# Patient Record
Sex: MALE | Race: BLACK OR AFRICAN AMERICAN | Employment: OTHER | URBAN - METROPOLITAN AREA
[De-identification: names, ages, dates, MRNs, and addresses within clinical notes are randomized per-mention and may not be internally consistent; named-entity substitution may affect disease eponyms.]

---

## 2018-11-08 ENCOUNTER — OP HISTORICAL/CONVERTED ENCOUNTER (OUTPATIENT)
Dept: OTHER | Age: 73
End: 2018-11-08

## 2021-06-01 PROBLEM — R13.10 DYSPHAGIA: Status: ACTIVE | Noted: 2021-06-01

## 2021-06-01 PROBLEM — R14.3 FLATULENCE: Status: ACTIVE | Noted: 2021-06-01

## 2021-06-01 PROBLEM — N40.0 BPH (BENIGN PROSTATIC HYPERPLASIA): Status: ACTIVE | Noted: 2021-06-01

## 2021-06-01 PROBLEM — K22.2 STRICTURE OF ESOPHAGUS: Status: ACTIVE | Noted: 2021-06-01

## 2021-06-01 PROBLEM — I10 HTN (HYPERTENSION): Status: ACTIVE | Noted: 2021-06-01

## 2021-06-01 PROBLEM — K21.00 REFLUX ESOPHAGITIS: Status: ACTIVE | Noted: 2021-06-01

## 2021-06-07 ENCOUNTER — OFFICE VISIT (OUTPATIENT)
Dept: GASTROENTEROLOGY | Age: 76
End: 2021-06-07
Payer: MEDICARE

## 2021-06-07 VITALS
DIASTOLIC BLOOD PRESSURE: 60 MMHG | OXYGEN SATURATION: 98 % | WEIGHT: 173.4 LBS | SYSTOLIC BLOOD PRESSURE: 130 MMHG | HEART RATE: 76 BPM | RESPIRATION RATE: 16 BRPM | BODY MASS INDEX: 24.27 KG/M2 | TEMPERATURE: 98.2 F | HEIGHT: 71 IN

## 2021-06-07 DIAGNOSIS — R13.19 ESOPHAGEAL DYSPHAGIA: Primary | ICD-10-CM

## 2021-06-07 DIAGNOSIS — Z86.010 HISTORY OF COLON POLYPS: ICD-10-CM

## 2021-06-07 DIAGNOSIS — K21.00 GASTROESOPHAGEAL REFLUX DISEASE WITH ESOPHAGITIS WITHOUT HEMORRHAGE: ICD-10-CM

## 2021-06-07 DIAGNOSIS — K22.2 STRICTURE OF ESOPHAGUS: ICD-10-CM

## 2021-06-07 PROCEDURE — 3017F COLORECTAL CA SCREEN DOC REV: CPT | Performed by: INTERNAL MEDICINE

## 2021-06-07 PROCEDURE — G8536 NO DOC ELDER MAL SCRN: HCPCS | Performed by: INTERNAL MEDICINE

## 2021-06-07 PROCEDURE — G8510 SCR DEP NEG, NO PLAN REQD: HCPCS | Performed by: INTERNAL MEDICINE

## 2021-06-07 PROCEDURE — G8420 CALC BMI NORM PARAMETERS: HCPCS | Performed by: INTERNAL MEDICINE

## 2021-06-07 PROCEDURE — 99204 OFFICE O/P NEW MOD 45 MIN: CPT | Performed by: INTERNAL MEDICINE

## 2021-06-07 PROCEDURE — 1101F PT FALLS ASSESS-DOCD LE1/YR: CPT | Performed by: INTERNAL MEDICINE

## 2021-06-07 PROCEDURE — G8754 DIAS BP LESS 90: HCPCS | Performed by: INTERNAL MEDICINE

## 2021-06-07 PROCEDURE — G8427 DOCREV CUR MEDS BY ELIG CLIN: HCPCS | Performed by: INTERNAL MEDICINE

## 2021-06-07 PROCEDURE — G8752 SYS BP LESS 140: HCPCS | Performed by: INTERNAL MEDICINE

## 2021-06-07 RX ORDER — HYDROCHLOROTHIAZIDE 12.5 MG/1
12.5 CAPSULE ORAL DAILY
COMMUNITY
Start: 2021-05-03

## 2021-06-07 RX ORDER — LISINOPRIL 40 MG/1
40 TABLET ORAL DAILY
COMMUNITY
Start: 2021-05-03

## 2021-06-07 RX ORDER — NIFEDIPINE 30 MG/1
30 TABLET, EXTENDED RELEASE ORAL DAILY
COMMUNITY
Start: 2021-05-03

## 2021-06-07 RX ORDER — PANTOPRAZOLE SODIUM 40 MG/1
40 TABLET, DELAYED RELEASE ORAL DAILY
COMMUNITY
Start: 2021-05-03

## 2021-06-07 RX ORDER — POLYETHYLENE GLYCOL 3350 17 G/17G
POWDER, FOR SOLUTION ORAL
Qty: 510 G | Refills: 0 | Status: SHIPPED | OUTPATIENT
Start: 2021-06-07 | End: 2021-06-17

## 2021-06-07 NOTE — H&P (VIEW-ONLY)
Ang Kimball is a 76 y.o. male who presents today for the following: Chief Complaint Patient presents with  Dysphagia  
  problem swallowing, choking No Known Allergies Current Outpatient Medications Medication Sig  
 hydroCHLOROthiazide (MICROZIDE) 12.5 mg capsule  lisinopriL (PRINIVIL, ZESTRIL) 40 mg tablet  NIFEdipine ER (PROCARDIA XL) 30 mg ER tablet  pantoprazole (PROTONIX) 40 mg tablet  polyethylene glycol (MIRALAX) 17 gram/dose powder Use as directed  Indications: emptying of the bowel No current facility-administered medications for this visit. Past Medical History:  
Diagnosis Date  BPH (benign prostatic hyperplasia) 6/1/2021  Colon polyps 2007 RECTAL POLYP  Cyst of kidney, acquired R KIDNEY 11-6-2010  Dysphagia 6/1/2021  Flatulence 6/1/2021  GERD (gastroesophageal reflux disease)  HTN (hypertension) 6/1/2021  Left adrenal mass ([address] 09/27/2004 ADENOMA  11-6-06  Reflux esophagitis 6/1/2021  Stricture of esophagus 6/1/2021 Past Surgical History:  
Procedure Laterality Date  APPENDECTOMY,W OTHR C    
 COLONOSCOPY  10/01/2007 RECTAL, POLYP  
 ENDOSCOPY VISIT-OUTPATIENT  07/10/2015  ENDOSCOPY VISIT-OUTPATIENT  10/01/2007 EGD, ED , COLONOSCOPY RECTAL POLYP  
 HX CATARACT REMOVAL Bilateral   
 9-, 10-25-05 Family History Problem Relation Age of Onset  Heart Disease Mother  Hypertension Other Social History Socioeconomic History  Marital status:  Spouse name: Not on file  Number of children: Not on file  Years of education: Not on file  Highest education level: Not on file Occupational History  Not on file Tobacco Use  Smoking status: Former Smoker  Smokeless tobacco: Never Used Substance and Sexual Activity  Alcohol use: Yes Comment: OCCASIONAL  
 Drug use: Not on file  Sexual activity: Not on file Other Topics Concern  Not on file Social History Narrative  Not on file Social Determinants of Health Financial Resource Strain:  Difficulty of Paying Living Expenses:   
Food Insecurity:  Worried About [address] in the Last Year:   
[address] in the Last Year:   
Transportation Needs:   
 Lack of Transportation (Medical):  Lack of Transportation (Non-Medical): Physical Activity:   
 Days of Exercise per Week:  Minutes of Exercise per Session:   
Stress:  Feeling of Stress :   
Social Connections:  Frequency of Communication with Friends and Family:  Frequency of Social Gatherings with Friends and Family:  Attends Spiritism Services:  Active Member of Clubs or Organizations:  Attends Club or Organization Meetings:  Marital Status: Intimate Partner Violence:  Fear of Current or Ex-Partner:  Emotionally Abused:   
 Physically Abused:   
 Sexually Abused: HPI 
77-year-old male 1year-old hypertension, gastroesophageal reflux disease, esophageal stricture, and BPH who comes in for evaluation of dysphagia. Patient states he has been doing somewhat better over the last couple weeks but had a lot of problems prior to that. States he has been put off, see a daughter for the last 6 months secondary to his dysphagia. He must drink a lot of water with anything solid he eats. He states however at times even water has a hard time going down. He does have a lot of heartburn and indigestion. Patient has a history of diarrhea intermittently. He also has increased GI noise. He states his weight has been stable. Patient last had an EGD with esophageal dilatation on 7/10/2015 which showed a lower esophageal stricture, reflux esophagitis his esophagus was dilated with the [facility] bougies, using 40, 44, and 50 Western Kathy dilators. Last had a colonoscopy in 2007 where he rectal polyp was removed. Review of Systems Constitutional: Negative.    
HENT: Negative. Negative for nosebleeds. Eyes: Negative. Respiratory: Negative. Negative for cough. Cardiovascular: Negative. Gastrointestinal: Positive for diarrhea and heartburn. Negative for abdominal pain, blood in stool, constipation, melena, nausea and vomiting. Genitourinary: Negative. Musculoskeletal: Negative. Skin: Negative. Neurological: Negative. Endo/Heme/Allergies: Negative. Psychiatric/Behavioral: Negative. All other systems reviewed and are negative. Visit Vitals /60 (BP 1 Location: Left upper arm, BP Patient Position: Sitting, BP Cuff Size: Adult) Pulse 76 Temp 98.2 °F (36.8 °C) (Temporal) Resp 16 Ht 5' 11\" (1.803 m) Wt 78.7 kg (173 lb 6.4 oz) SpO2 98% BMI 24.18 kg/m² Physical Exam 
Vitals and nursing note reviewed. Constitutional:   
   General: He is not in acute distress. Appearance: Normal appearance. He is obese. He is not ill-appearing. HENT:  
   Head: Normocephalic and atraumatic. Nose: Nose normal.  
   Mouth/Throat:  
   Mouth: Mucous membranes are moist.  
   Pharynx: Oropharynx is clear. Eyes:  
   General: No scleral icterus. Extraocular Movements: Extraocular movements intact. Conjunctiva/sclera: Conjunctivae normal.  
   Pupils: Pupils are equal, round, and reactive to light. Cardiovascular:  
   Rate and Rhythm: Normal rate and regular rhythm. Heart sounds: Normal heart sounds. Pulmonary:  
   Effort: Pulmonary effort is normal.  
   Breath sounds: Normal breath sounds. Abdominal:  
   General: Bowel sounds are normal. There is no distension. Palpations: Abdomen is soft. There is no mass. Tenderness: There is no abdominal tenderness. There is no right CVA tenderness, left CVA tenderness, guarding or rebound. Hernia: No hernia is present. Musculoskeletal:     
   General: Normal range of motion. Cervical back: Normal range of motion and neck supple.   
Skin: 
   General: Skin is warm and dry. Coloration: Skin is not jaundiced. Neurological:  
   General: No focal deficit present. Mental Status: He is alert and oriented to person, place, and time. Psychiatric:     
   Mood and Affect: Mood normal.     
   Behavior: Behavior normal.     
   Thought Content: Thought content normal.     
   Judgment: Judgment normal.  
 
  
 
 
1. Esophageal dysphagia We will plan to perform an EGD with esophageal dilatation. 
- UPPER GI ENDOSCOPY,DIAGNOSIS; Future 2. Stricture of esophagus Likely the underlying cause for his dysphagia. - UPPER GI ENDOSCOPY,DIAGNOSIS; Future 3. Gastroesophageal reflux disease with esophagitis without hemorrhage - UPPER GI ENDOSCOPY,DIAGNOSIS; Future 4. History of colon polyps Patient is past due for his surveillance colonoscopy since it has been 14 years. - COLONOSCOPY,DIAGNOSTIC; Future - polyethylene glycol (MIRALAX) 17 gram/dose powder; Use as directed  Indications: emptying of the bowel  Dispense: 510 g; Refill: 0

## 2021-06-07 NOTE — PROGRESS NOTES
1. Have you been to the ER, urgent care clinic since your last visit? Hospitalized since your last visit? NO    2. Have you seen or consulted any other health care providers outside of the [facility] since your last visit? Include any pap smears or colon screening. NO   Chief Complaint   Patient presents with    Dysphagia     problem swallowing, choking     Visit Vitals  /60 (BP 1 Location: Left upper arm, BP Patient Position: Sitting, BP Cuff Size: Adult)   Pulse 76   Temp 98.2 °F (36.8 °C) (Temporal)   Resp 16   Ht 5' 11\" (1.803 m)   Wt 78.7 kg (173 lb 6.4 oz)   SpO2 98%   BMI 24.18 kg/m²     .

## 2021-06-07 NOTE — PROGRESS NOTES
EGD AND COLONOSCOPY ARE SCHEDULED FOR 6- AT 11:00 AM.  FULLY VACCINATED FOR COVID-NO TEST REQUIRED. NO PA REQUIRED-MEDICARE.

## 2021-06-07 NOTE — PROGRESS NOTES
Aiden Gunn is a 76 y.o. male who presents today for the following:  Chief Complaint   Patient presents with    Dysphagia     problem swallowing, choking         No Known Allergies    Current Outpatient Medications   Medication Sig    hydroCHLOROthiazide (MICROZIDE) 12.5 mg capsule     lisinopriL (PRINIVIL, ZESTRIL) 40 mg tablet     NIFEdipine ER (PROCARDIA XL) 30 mg ER tablet     pantoprazole (PROTONIX) 40 mg tablet     polyethylene glycol (MIRALAX) 17 gram/dose powder Use as directed  Indications: emptying of the bowel     No current facility-administered medications for this visit.        Past Medical History:   Diagnosis Date    BPH (benign prostatic hyperplasia) 6/1/2021    Colon polyps 2007    RECTAL POLYP    Cyst of kidney, acquired     R KIDNEY 11-6-2010    Dysphagia 6/1/2021    Flatulence 6/1/2021    GERD (gastroesophageal reflux disease)     HTN (hypertension) 6/1/2021    Left adrenal mass ([address] 09/27/2004    ADENOMA  11-6-06    Reflux esophagitis 6/1/2021    Stricture of esophagus 6/1/2021       Past Surgical History:   Procedure Laterality Date    APPENDECTOMY,W OTHR C      COLONOSCOPY  10/01/2007    RECTAL, POLYP    ENDOSCOPY VISIT-OUTPATIENT  07/10/2015    ENDOSCOPY VISIT-OUTPATIENT  10/01/2007    EGD, ED , COLONOSCOPY RECTAL POLYP    HX CATARACT REMOVAL Bilateral     9-, 10-25-05       Family History   Problem Relation Age of Onset    Heart Disease Mother     Hypertension Other        Social History     Socioeconomic History    Marital status:      Spouse name: Not on file    Number of children: Not on file    Years of education: Not on file    Highest education level: Not on file   Occupational History    Not on file   Tobacco Use    Smoking status: Former Smoker    Smokeless tobacco: Never Used   Substance and Sexual Activity    Alcohol use: Yes     Comment: OCCASIONAL    Drug use: Not on file    Sexual activity: Not on file   Other Topics Concern    Not on file   Social History Narrative    Not on file     Social Determinants of Health     Financial Resource Strain:     Difficulty of Paying Living Expenses:    Food Insecurity:     Worried About Running Out of Food in the Last Year:     [address] in the Last Year:    Transportation Needs:     Lack of Transportation (Medical):  Lack of Transportation (Non-Medical):    Physical Activity:     Days of Exercise per Week:     Minutes of Exercise per Session:    Stress:     Feeling of Stress :    Social Connections:     Frequency of Communication with Friends and Family:     Frequency of Social Gatherings with Friends and Family:     Attends [employer] Services:     Active Member of Clubs or Organizations:     Attends Club or Organization Meetings:     Marital Status:    Intimate Partner Violence:     Fear of Current or Ex-Partner:     Emotionally Abused:     Physically Abused:     Sexually Abused:          HPI  51-year-old male 1year-old hypertension, gastroesophageal reflux disease, esophageal stricture, and BPH who comes in for evaluation of dysphagia. Patient states he has been doing somewhat better over the last couple weeks but had a lot of problems prior to that. States he has been put off, see a daughter for the last 6 months secondary to his dysphagia. He must drink a lot of water with anything solid he eats. He states however at times even water has a hard time going down. He does have a lot of heartburn and indigestion. Patient has a history of diarrhea intermittently. He also has increased GI noise. He states his weight has been stable. Patient last had an EGD with esophageal dilatation on 7/10/2015 which showed a lower esophageal stricture, reflux esophagitis his esophagus was dilated with the [facility] bougies, using 40, 44, and 50 Western Kathy dilators. Last had a colonoscopy in 2007 where he rectal polyp was removed. Review of Systems   Constitutional: Negative.     HENT: Negative. Negative for nosebleeds. Eyes: Negative. Respiratory: Negative. Negative for cough. Cardiovascular: Negative. Gastrointestinal: Positive for diarrhea and heartburn. Negative for abdominal pain, blood in stool, constipation, melena, nausea and vomiting. Genitourinary: Negative. Musculoskeletal: Negative. Skin: Negative. Neurological: Negative. Endo/Heme/Allergies: Negative. Psychiatric/Behavioral: Negative. All other systems reviewed and are negative. Visit Vitals  /60 (BP 1 Location: Left upper arm, BP Patient Position: Sitting, BP Cuff Size: Adult)   Pulse 76   Temp 98.2 °F (36.8 °C) (Temporal)   Resp 16   Ht 5' 11\" (1.803 m)   Wt 78.7 kg (173 lb 6.4 oz)   SpO2 98%   BMI 24.18 kg/m²     Physical Exam  Vitals and nursing note reviewed. Constitutional:       General: He is not in acute distress. Appearance: Normal appearance. He is obese. He is not ill-appearing. HENT:      Head: Normocephalic and atraumatic. Nose: Nose normal.      Mouth/Throat:      Mouth: Mucous membranes are moist.      Pharynx: Oropharynx is clear. Eyes:      General: No scleral icterus. Extraocular Movements: Extraocular movements intact. Conjunctiva/sclera: Conjunctivae normal.      Pupils: Pupils are equal, round, and reactive to light. Cardiovascular:      Rate and Rhythm: Normal rate and regular rhythm. Heart sounds: Normal heart sounds. Pulmonary:      Effort: Pulmonary effort is normal.      Breath sounds: Normal breath sounds. Abdominal:      General: Bowel sounds are normal. There is no distension. Palpations: Abdomen is soft. There is no mass. Tenderness: There is no abdominal tenderness. There is no right CVA tenderness, left CVA tenderness, guarding or rebound. Hernia: No hernia is present. Musculoskeletal:         General: Normal range of motion. Cervical back: Normal range of motion and neck supple.    Skin:     General: Skin is warm and dry. Coloration: Skin is not jaundiced. Neurological:      General: No focal deficit present. Mental Status: He is alert and oriented to person, place, and time. Psychiatric:         Mood and Affect: Mood normal.         Behavior: Behavior normal.         Thought Content: Thought content normal.         Judgment: Judgment normal.            1. Esophageal dysphagia  We will plan to perform an EGD with esophageal dilatation.  - UPPER GI ENDOSCOPY,DIAGNOSIS; Future    2. Stricture of esophagus  Likely the underlying cause for his dysphagia. - UPPER GI ENDOSCOPY,DIAGNOSIS; Future    3. Gastroesophageal reflux disease with esophagitis without hemorrhage    - UPPER GI ENDOSCOPY,DIAGNOSIS; Future    4. History of colon polyps  Patient is past due for his surveillance colonoscopy since it has been 14 years.   - COLONOSCOPY,DIAGNOSTIC; Future  - polyethylene glycol (MIRALAX) 17 gram/dose powder; Use as directed  Indications: emptying of the bowel  Dispense: 510 g; Refill: 0

## 2021-06-17 ENCOUNTER — ANESTHESIA EVENT (OUTPATIENT)
Dept: ENDOSCOPY | Age: 76
End: 2021-06-17
Payer: MEDICARE

## 2021-06-17 ENCOUNTER — ANESTHESIA (OUTPATIENT)
Dept: ENDOSCOPY | Age: 76
End: 2021-06-17
Payer: MEDICARE

## 2021-06-17 ENCOUNTER — HOSPITAL ENCOUNTER (OUTPATIENT)
Age: 76
Setting detail: OUTPATIENT SURGERY
Discharge: HOME | End: 2021-06-17
Attending: INTERNAL MEDICINE | Admitting: INTERNAL MEDICINE
Payer: MEDICARE

## 2021-06-17 VITALS
SYSTOLIC BLOOD PRESSURE: 145 MMHG | HEIGHT: 71 IN | TEMPERATURE: 97.9 F | WEIGHT: 168 LBS | HEART RATE: 85 BPM | DIASTOLIC BLOOD PRESSURE: 80 MMHG | OXYGEN SATURATION: 97 % | BODY MASS INDEX: 23.52 KG/M2 | RESPIRATION RATE: 20 BRPM

## 2021-06-17 PROCEDURE — 74011250636 HC RX REV CODE- 250/636: Performed by: NURSE ANESTHETIST, CERTIFIED REGISTERED

## 2021-06-17 PROCEDURE — 88312 SPECIAL STAINS GROUP 1: CPT

## 2021-06-17 PROCEDURE — 43450 DILATE ESOPHAGUS 1/MULT PASS: CPT | Performed by: INTERNAL MEDICINE

## 2021-06-17 PROCEDURE — 43239 EGD BIOPSY SINGLE/MULTIPLE: CPT | Performed by: INTERNAL MEDICINE

## 2021-06-17 PROCEDURE — 2709999900 HC NON-CHARGEABLE SUPPLY: Performed by: INTERNAL MEDICINE

## 2021-06-17 PROCEDURE — 76060000032 HC ANESTHESIA 0.5 TO 1 HR: Performed by: INTERNAL MEDICINE

## 2021-06-17 PROCEDURE — 45378 DIAGNOSTIC COLONOSCOPY: CPT | Performed by: INTERNAL MEDICINE

## 2021-06-17 PROCEDURE — 76040000007: Performed by: INTERNAL MEDICINE

## 2021-06-17 PROCEDURE — 77030018831 HC SOL IRR H20 BAXT -A: Performed by: INTERNAL MEDICINE

## 2021-06-17 PROCEDURE — 88305 TISSUE EXAM BY PATHOLOGIST: CPT

## 2021-06-17 PROCEDURE — 77030021593 HC FCPS BIOP ENDOSC BSC -A: Performed by: INTERNAL MEDICINE

## 2021-06-17 PROCEDURE — 74011250636 HC RX REV CODE- 250/636: Performed by: INTERNAL MEDICINE

## 2021-06-17 RX ORDER — SODIUM CHLORIDE 0.9 % (FLUSH) 0.9 %
5-40 SYRINGE (ML) INJECTION EVERY 8 HOURS
Status: DISCONTINUED | OUTPATIENT
Start: 2021-06-17 | End: 2021-06-17 | Stop reason: HOSPADM

## 2021-06-17 RX ORDER — SODIUM CHLORIDE 9 MG/ML
125 INJECTION, SOLUTION INTRAVENOUS CONTINUOUS
Status: DISCONTINUED | OUTPATIENT
Start: 2021-06-17 | End: 2021-06-17 | Stop reason: HOSPADM

## 2021-06-17 RX ORDER — PROPOFOL 10 MG/ML
INJECTION, EMULSION INTRAVENOUS AS NEEDED
Status: DISCONTINUED | OUTPATIENT
Start: 2021-06-17 | End: 2021-06-17 | Stop reason: HOSPADM

## 2021-06-17 RX ORDER — SODIUM CHLORIDE 0.9 % (FLUSH) 0.9 %
5-40 SYRINGE (ML) INJECTION AS NEEDED
Status: DISCONTINUED | OUTPATIENT
Start: 2021-06-17 | End: 2021-06-17 | Stop reason: HOSPADM

## 2021-06-17 RX ORDER — SODIUM CHLORIDE 9 MG/ML
INJECTION, SOLUTION INTRAVENOUS
Status: DISCONTINUED | OUTPATIENT
Start: 2021-06-17 | End: 2021-06-17 | Stop reason: HOSPADM

## 2021-06-17 RX ORDER — SODIUM CHLORIDE 9 MG/ML
25 INJECTION, SOLUTION INTRAVENOUS CONTINUOUS
Status: DISCONTINUED | OUTPATIENT
Start: 2021-06-17 | End: 2021-06-17 | Stop reason: HOSPADM

## 2021-06-17 RX ADMIN — PROPOFOL 100 MG: 10 INJECTION, EMULSION INTRAVENOUS at 11:33

## 2021-06-17 RX ADMIN — PROPOFOL 100 MG: 10 INJECTION, EMULSION INTRAVENOUS at 11:23

## 2021-06-17 RX ADMIN — PROPOFOL 100 MG: 10 INJECTION, EMULSION INTRAVENOUS at 11:42

## 2021-06-17 RX ADMIN — SODIUM CHLORIDE 25 ML/HR: 9 INJECTION, SOLUTION INTRAVENOUS at 09:30

## 2021-06-17 RX ADMIN — PROPOFOL 100 MG: 10 INJECTION, EMULSION INTRAVENOUS at 11:11

## 2021-06-17 RX ADMIN — SODIUM CHLORIDE: 9 INJECTION, SOLUTION INTRAVENOUS at 10:59

## 2021-06-17 NOTE — OP NOTES
Colonoscopy Procedure Note      Patient: Nestor Crowe MRN: [id number]  SSN: [id number]    YOB: 1945  Age: 76 y.o. Sex: male      Date of Procedure: 6/17/2021  Date/Time:  6/17/2021 11:59 AM       IMPRESSION:     1. Redundant colon       RECOMMENDATIONS:     1) Patient should have repeat colonoscopy in 5 years. INDICATION: History of colon polyps     PROCEDURE PERFORMED: Colonoscopy      DESCRIPTION OF PROCEDURE: An informed consent was obtained. The patient was placed in left lateral position. Perianal inspection and a digital rectal exam was performed. Video colonoscope was introduced into the rectum and advanced under direct vision up to the terminal ileum. With adequate insufflation and maneuvering of the withdrawing scope, the colonic mucosa was visualized carefully. Retroflexion was performed in the rectum to see the anorectum and also in the ascending colon to look behind the folds. Vital signs, pulse oximetry, single lead cardiac monitor were monitored throughout the procedure as the sedation was titrated to the desired effect ensuring patient comfort and safety. The patient tolerated the procedure very well and was transferred to the recovery area. Following is the summary of findings: There was a large amount of redundancy in the right colon otherwise unremarkable. ENDOSCOPIST: Amaya Mendoza MD      ENDOSCOPE: Olympus videocolonoscope     ASSISTANT:Circ-1: Rashmi Murrieta RN              Scrub Tech-1: Yanely Ramos     ANESTHESIA: TIVA      QUALITY OF PREPARATION: Good      FINDINGS:   1.  Redundant colon      EBL: None   SPECIMENS:   ID Type Source Tests Collected by Time Destination   1 : gastric antrum Preservative Stomach, Antrum  Naida Orona MD 6/17/2021 1114 Pathology             Amaya Mendoza MD  June 17, 2021  11:59 AM

## 2021-06-17 NOTE — ANESTHESIA PREPROCEDURE EVALUATION
Relevant Problems   No relevant active problems       Anesthetic History   No history of anesthetic complications  Other anesthesia complications          Review of Systems / Medical History  Patient summary reviewed, nursing notes reviewed and pertinent labs reviewed    Pulmonary  Within defined limits                 Neuro/Psych   Within defined limits           Cardiovascular  Within defined limits                     GI/Hepatic/Renal  Within defined limits              Endo/Other  Within defined limits           Other Findings              Physical Exam    Airway  Mallampati: II  TM Distance: 4 - 6 cm  Neck ROM: normal range of motion        Cardiovascular    Rhythm: regular  Rate: normal         Dental    Dentition: Edentulous     Pulmonary  Breath sounds clear to auscultation               Abdominal  Abdominal exam normal       Other Findings            Anesthetic Plan    ASA: 3  Anesthesia type: total IV anesthesia            Anesthetic plan and risks discussed with: Patient

## 2021-06-17 NOTE — INTERVAL H&P NOTE
Update History & Physical 
 
The Patient's History and Physical of June 17, 2021 was reviewed with the patient and I examined the patient. There was no change. The surgical site was confirmed by the patient and me. Plan:  The risk, benefits, expected outcome, and alternative to the recommended procedure have been discussed with the patient. Patient understands and wants to proceed with the procedure.  
 
Electronically signed by Nick Meyers MD on 6/17/2021 at 9:49 AM

## 2021-06-17 NOTE — DISCHARGE INSTRUCTIONS

## 2021-06-17 NOTE — ANESTHESIA POSTPROCEDURE EVALUATION
Procedure(s):  COLONOSCOPY AND EGD (T I V A)  ESOPHAGOGASTRODUODENOSCOPY (EGD). total IV anesthesia    Anesthesia Post Evaluation      Multimodal analgesia: multimodal analgesia not used between 6 hours prior to anesthesia start to PACU discharge  Patient location during evaluation: PACU  Patient participation: complete - patient participated  Level of consciousness: awake  Pain score: 0  Pain management: adequate  Airway patency: patent  Anesthetic complications: no  Cardiovascular status: acceptable  Respiratory status: acceptable and room air  Hydration status: stable  Post anesthesia nausea and vomiting:  none  Final Post Anesthesia Temperature Assessment:  Normothermia (36.0-37.5 degrees C)      INITIAL Post-op Vital signs: No vitals data found for the desired time range.

## 2021-06-17 NOTE — OP NOTES
EGD Procedure Note        Patient: Natalia Bullock MRN: [id number]  SSN: [id number]    YOB: 1945  Age: 76 y.o. Sex: male        Date/Time:  6/17/2021 12:06 PM         IMPRESSION:       1. Lower esophageal stricture  2. Distal esophagitis (grade 2)  3. Antral gastritis       RECOMMENDATIONS:    1. Check biopsy results. 2. Continue the pantoprazole 40 mg daily. 3. Repeat esophageal dilatation as needed. Procedure: Esophagogastroduodenoscopy with cold biopsies                       Esophageal dilatation    Indication: Esophageal dysphagia    Endoscopist:  Ron Ordonez MD    Referring Provider:   Vamsi Pizarro NP    History: The history and physical exam were reviewed and updated. Endoscope: GIF H190 Olympus video endoscope    Extent of Exam: Second part of the duodenum    ASA: Grade 2    Anethesia/Sedation:  TIVA    Description of the procedure: The procedure was discussed with the patient including risks, benefits, alternatives including risks of iv sedation, bleeding, perforation and aspiration. A safety timeout was performed. The patient was placed in the left lateral decubitus position. A bite block was placed. The patient was using standard protocol. The patients vital signs were monitored at all times including heart rate/rhythm, blood pressure and oxygen saturation. The endoscope was then passed under direct visualization to the second part of the duodenum. The endoscope was then slowly withdrawn while visualizing the mucosa. In the stomach a retroflexion was performed and gastric fundus and cardia visualized. The patient was then transferred to recovery in stable condition. Findings:   Esophagus: The esophageal mucosa was noted to be inflamed in the GE junction region where there was presence of a lower esophageal stricture. The inflammation was felt to be a grade 2 esophagitis. Also noted to have decreased lower esophageal sphincter tone.   In the remainder of the esophagus the patient had a whitish exudate that was noted scattered throughout the esophagus possibly consistent with Candida esophagitis. May consider treating patient for that if he is symptomatic. Stomach: The gastric mucosa was flamed throughout the gastric antrum and to lesser degree over the entire stomach. Biopsies were taken in the gastric antrum of this finding. Duodenum: The duodenum mucosa was normal with no ulceration, mass, stricture and no evidence of villous atrophy. Therapies: Esophageal dilatation using the [address] bougie. After the dilator was well lubricated it was inserted down the oropharynx and take down time 4 passes and then removed. Patient tolerated the procedure well. Specimens:   ID Type Source Tests Collected by Time Destination   1 : gastric antrum Preservative Stomach, Antrum  Elver Krishnamurthy MD 6/17/2021 1114 Pathology              EBL: Minimal    Complications:   None; patient tolerated the procedure well.      Implants: None    Discharge disposition:  Out of the recovery area when discharge criteria met        Yg Monsivais MD  June 17, 2021  12:06 PM

## 2021-06-22 NOTE — PROGRESS NOTES
Tell patient that the biopsies took in his stomach showed gastritis/inflammation. Infection was noted. Continue the pantoprazole 40 mg daily. Did have a repeat colonoscopy in 5 years.

## 2021-07-03 ENCOUNTER — TELEPHONE (OUTPATIENT)
Dept: GASTROENTEROLOGY | Age: 76
End: 2021-07-03

## 2021-07-03 NOTE — TELEPHONE ENCOUNTER
I called patient, after verified , explained that his test showed gastritis , no infection. Continue Pantoprazole. Repeat colonoscopy in 5 yrs . He said ok.

## 2021-07-03 NOTE — TELEPHONE ENCOUNTER
----- Message from Francisco Ortega MD sent at 6/22/2021 10:25 AM EDT -----  Tell patient that the biopsies took in his stomach showed gastritis/inflammation. Infection was not noted. Continue the pantoprazole 40 mg daily. Should  have a repeat colonoscopy in 5 years.

## 2021-11-02 ENCOUNTER — TRANSCRIBE ORDER (OUTPATIENT)
Dept: SCHEDULING | Age: 76
End: 2021-11-02

## 2021-11-03 ENCOUNTER — OFFICE VISIT (OUTPATIENT)
Dept: GASTROENTEROLOGY | Age: 76
End: 2021-11-03
Payer: MEDICARE

## 2021-11-03 VITALS
BODY MASS INDEX: 23.83 KG/M2 | SYSTOLIC BLOOD PRESSURE: 148 MMHG | OXYGEN SATURATION: 96 % | DIASTOLIC BLOOD PRESSURE: 60 MMHG | TEMPERATURE: 98.4 F | HEIGHT: 71 IN | HEART RATE: 71 BPM | RESPIRATION RATE: 14 BRPM | WEIGHT: 170.2 LBS

## 2021-11-03 DIAGNOSIS — R10.9 CHRONIC LEFT FLANK PAIN: ICD-10-CM

## 2021-11-03 DIAGNOSIS — R14.0 BLOATING: ICD-10-CM

## 2021-11-03 DIAGNOSIS — K22.2 STRICTURE OF ESOPHAGUS: ICD-10-CM

## 2021-11-03 DIAGNOSIS — R53.82 CHRONIC FATIGUE: Primary | ICD-10-CM

## 2021-11-03 DIAGNOSIS — R13.19 ESOPHAGEAL DYSPHAGIA: ICD-10-CM

## 2021-11-03 DIAGNOSIS — K21.00 GASTROESOPHAGEAL REFLUX DISEASE WITH ESOPHAGITIS WITHOUT HEMORRHAGE: ICD-10-CM

## 2021-11-03 DIAGNOSIS — R53.82 CHRONIC FATIGUE: ICD-10-CM

## 2021-11-03 DIAGNOSIS — G89.29 CHRONIC LEFT FLANK PAIN: ICD-10-CM

## 2021-11-03 PROCEDURE — G8420 CALC BMI NORM PARAMETERS: HCPCS | Performed by: INTERNAL MEDICINE

## 2021-11-03 PROCEDURE — G8427 DOCREV CUR MEDS BY ELIG CLIN: HCPCS | Performed by: INTERNAL MEDICINE

## 2021-11-03 PROCEDURE — 99214 OFFICE O/P EST MOD 30 MIN: CPT | Performed by: INTERNAL MEDICINE

## 2021-11-03 PROCEDURE — G8753 SYS BP > OR = 140: HCPCS | Performed by: INTERNAL MEDICINE

## 2021-11-03 PROCEDURE — G8510 SCR DEP NEG, NO PLAN REQD: HCPCS | Performed by: INTERNAL MEDICINE

## 2021-11-03 PROCEDURE — G8536 NO DOC ELDER MAL SCRN: HCPCS | Performed by: INTERNAL MEDICINE

## 2021-11-03 PROCEDURE — 1101F PT FALLS ASSESS-DOCD LE1/YR: CPT | Performed by: INTERNAL MEDICINE

## 2021-11-03 PROCEDURE — G8754 DIAS BP LESS 90: HCPCS | Performed by: INTERNAL MEDICINE

## 2021-11-03 NOTE — PROGRESS NOTES
1. Have you been to the ER, urgent care clinic since your last visit? Hospitalized since your last visit? NO    2. Have you seen or consulted any other health care providers outside of the [address] [place] [address] since your last visit? Include any pap smears or colon screening.   NO   Chief Complaint   Patient presents with    Follow-up     OV 6-    Dysphagia     SAYS IS SWALLOWING BETTER     Visit Vitals  BP (!) 148/60 (BP 1 Location: Left upper arm, BP Patient Position: Sitting, BP Cuff Size: Adult)   Pulse 71   Temp 98.4 °F (36.9 °C) (Temporal)   Resp 14   Ht 5' 11\" (1.803 m)   Wt 77.2 kg (170 lb 3.2 oz)   SpO2 96% Comment: ROOM AIR   BMI 23.74 kg/m²

## 2021-11-04 DIAGNOSIS — R53.82 CHRONIC FATIGUE: ICD-10-CM

## 2021-11-04 NOTE — PROGRESS NOTES
Tammy Funes is a 68 y.o. male who presents today for the following:  Chief Complaint   Patient presents with    Follow-up     OV 6-    Dysphagia     SAYS IS SWALLOWING BETTER         No Known Allergies    Current Outpatient Medications   Medication Sig    cranberry fruit extract (CRANBERRY EXTRACT PO) Take  by mouth daily.  hydroCHLOROthiazide (MICROZIDE) 12.5 mg capsule 12.5 mg daily.  lisinopriL (PRINIVIL, ZESTRIL) 40 mg tablet Take 40 mg by mouth daily.  NIFEdipine ER (PROCARDIA XL) 30 mg ER tablet Take 30 mg by mouth daily.  pantoprazole (PROTONIX) 40 mg tablet Take 40 mg by mouth daily. No current facility-administered medications for this visit.        Past Medical History:   Diagnosis Date    BPH (benign prostatic hyperplasia) 6/1/2021    Colon polyps 2007    RECTAL POLYP    Cyst of kidney, acquired     R KIDNEY 11-6-2010    Dysphagia 6/1/2021    Flatulence 6/1/2021    GERD (gastroesophageal reflux disease)     HTN (hypertension) 6/1/2021    Left adrenal mass ([address] 09/27/2004    ADENOMA  11-6-06    Reflux esophagitis 6/1/2021    Stricture of esophagus 6/1/2021       Past Surgical History:   Procedure Laterality Date    APPENDECTOMY,W OTHR C      COLONOSCOPY  10/01/2007    RECTAL, POLYP    COLONOSCOPY N/A 6/17/2021    COLONOSCOPY AND EGD (T I V A) performed by Fran Bourne MD at [facility] ENDOSCOPY    ENDOSCOPY VISIT-OUTPATIENT  07/10/2015    ENDOSCOPY VISIT-OUTPATIENT  10/01/2007    EGD, ED , COLONOSCOPY RECTAL POLYP    HX CATARACT REMOVAL Bilateral     9-, 10-25-05       Family History   Problem Relation Age of Onset    Heart Disease Mother     Hypertension Other     No Known Problems Father        Social History     Socioeconomic History    Marital status:      Spouse name: Not on file    Number of children: Not on file    Years of education: Not on file    Highest education level: Not on file   Occupational History    Not on file   Tobacco Use    Smoking status: Former Smoker    Smokeless tobacco: Never Used   Vaping Use    Vaping Use: Never used   Substance and Sexual Activity    Alcohol use: Yes     Alcohol/week: 5.0 standard drinks     Types: 1 Glasses of wine, 2 Cans of beer, 2 Shots of liquor per week     Comment: OCCASIONAL    Drug use: Never    Sexual activity: Not on file   Other Topics Concern    Not on file   Social History Narrative    Not on file     Social Determinants of Health     Financial Resource Strain:     Difficulty of Paying Living Expenses: Not on file   Food Insecurity:     Worried About Running Out of Food in the Last Year: Not on file    Hilda of Food in the Last Year: Not on file   Transportation Needs:     Lack of Transportation (Medical): Not on file    Lack of Transportation (Non-Medical): Not on file   Physical Activity:     Days of Exercise per Week: Not on file    Minutes of Exercise per Session: Not on file   Stress:     Feeling of Stress : Not on file   Social Connections:     Frequency of Communication with Friends and Family: Not on file    Frequency of Social Gatherings with Friends and Family: Not on file    Attends Yarsanism Services: Not on file    Active Member of [address] [place] [address] [employer] or Organizations: Not on file    Attends Club or Organization Meetings: Not on file    Marital Status: Not on file   Intimate Partner Violence:     Fear of Current or Ex-Partner: Not on file    Emotionally Abused: Not on file    Physically Abused: Not on file    Sexually Abused: Not on file   Housing Stability:     Unable to Pay for Housing in the Last Year: Not on file    Number of [place] in the Last Year: Not on file    Unstable Housing in the Last Year: Not on file         HPI  72-year-old male with history of hypertension, gastroesophageal reflux disease, esophageal stricture with dysphagia, and BPH who comes in for evaluation.   Patient states he has had pain and discomfort on the left flank which started when he was doing some work at home. He saw his PCP and was given medications for pain. The sharp pain that he had did improve but he continues to have a lingering discomfort in the left flank and back. He states the pain medication caused him become constipated which required treatment because constipation made the pain worse. When he was taken off the pain medication he was placed on a muscle relaxer which did not help a lot. He had x-rays done which were negative. Patient states he feels better if he belches or has a bowel movement. He feels bloated in a.m., but passing gas helps that. He states the pain has been decreasing from a 10/10 to a present 410. Urinates without problems. Has bowel movements are okay now since he got off the pain medications. States he remains more tired especially in the evenings.  has not had any blood work done recently. Review of Systems   Constitutional: Negative. HENT: Negative. Negative for nosebleeds. Eyes: Negative. Respiratory: Negative. Cardiovascular: Negative. Gastrointestinal: Positive for abdominal pain, constipation and heartburn. Negative for blood in stool, diarrhea, melena, nausea and vomiting. Genitourinary: Negative. Musculoskeletal: Positive for back pain and myalgias. Skin: Negative. Neurological: Negative. Endo/Heme/Allergies: Negative. Psychiatric/Behavioral: Negative. All other systems reviewed and are negative. Visit Vitals  BP (!) 148/60 (BP 1 Location: Left upper arm, BP Patient Position: Sitting, BP Cuff Size: Adult)   Pulse 71   Temp 98.4 °F (36.9 °C) (Temporal)   Resp 14   Ht 5' 11\" (1.803 m)   Wt 77.2 kg (170 lb 3.2 oz)   SpO2 96% Comment: ROOM AIR   BMI 23.74 kg/m²     Physical Exam  Vitals and nursing note reviewed. Constitutional:       Appearance: Normal appearance. He is normal weight. HENT:      Head: Normocephalic and atraumatic.       Nose: Nose normal.      Mouth/Throat: Mouth: Mucous membranes are moist.      Pharynx: Oropharynx is clear. Eyes:      General: No scleral icterus. Extraocular Movements: Extraocular movements intact. Conjunctiva/sclera: Conjunctivae normal.      Pupils: Pupils are equal, round, and reactive to light. Cardiovascular:      Rate and Rhythm: Normal rate and regular rhythm. Heart sounds: Normal heart sounds. Pulmonary:      Effort: Pulmonary effort is normal.      Breath sounds: Normal breath sounds. Abdominal:      General: Bowel sounds are normal. There is no distension. Palpations: Abdomen is soft. There is no mass. Tenderness: There is abdominal tenderness. There is left CVA tenderness. There is no right CVA tenderness, guarding or rebound. Hernia: No hernia is present. Musculoskeletal:         General: Normal range of motion. Cervical back: Normal range of motion and neck supple. Skin:     General: Skin is warm and dry. Coloration: Skin is not jaundiced. Neurological:      General: No focal deficit present. Mental Status: He is alert and oriented to person, place, and time. Psychiatric:         Mood and Affect: Mood normal.         Behavior: Behavior normal.         Thought Content: Thought content normal.         Judgment: Judgment normal.            1. Chronic fatigue    - CBC WITH AUTOMATED DIFF; Future  - METABOLIC PANEL, COMPREHENSIVE; Future    2. Chronic left flank pain  It appears to be more left CVA tenderness    3. Bloating      4. Gastroesophageal reflux disease with esophagitis without hemorrhage      5. Esophageal dysphagia  Asymptomatic    6.  Stricture of esophagus

## 2021-11-05 LAB
ALBUMIN SERPL-MCNC: 4.6 G/DL (ref 3.7–4.7)
ALBUMIN/GLOB SERPL: 1.6 {RATIO} (ref 1.2–2.2)
ALP SERPL-CCNC: 64 IU/L (ref 44–121)
ALT SERPL-CCNC: 11 IU/L (ref 0–44)
AST SERPL-CCNC: 13 IU/L (ref 0–40)
BASOPHILS # BLD AUTO: 0 X10E3/UL (ref 0–0.2)
BASOPHILS NFR BLD AUTO: 1 %
BILIRUB SERPL-MCNC: 0.6 MG/DL (ref 0–1.2)
BUN SERPL-MCNC: 20 MG/DL (ref 8–27)
BUN/CREAT SERPL: 16 (ref 10–24)
CALCIUM SERPL-MCNC: 9.9 MG/DL (ref 8.6–10.2)
CHLORIDE SERPL-SCNC: 100 MMOL/L (ref 96–106)
CO2 SERPL-SCNC: 25 MMOL/L (ref 20–29)
CREAT SERPL-MCNC: 1.24 MG/DL (ref 0.76–1.27)
EOSINOPHIL # BLD AUTO: 0.3 X10E3/UL (ref 0–0.4)
EOSINOPHIL NFR BLD AUTO: 3 %
ERYTHROCYTE [DISTWIDTH] IN BLOOD BY AUTOMATED COUNT: 11.4 % (ref 11.6–15.4)
GLOBULIN SER CALC-MCNC: 2.8 G/DL (ref 1.5–4.5)
GLUCOSE SERPL-MCNC: 106 MG/DL (ref 65–99)
HCT VFR BLD AUTO: 38.5 % (ref 37.5–51)
HGB BLD-MCNC: 12.7 G/DL (ref 13–17.7)
IMM GRANULOCYTES # BLD AUTO: 0 X10E3/UL (ref 0–0.1)
IMM GRANULOCYTES NFR BLD AUTO: 0 %
LYMPHOCYTES # BLD AUTO: 2.3 X10E3/UL (ref 0.7–3.1)
LYMPHOCYTES NFR BLD AUTO: 27 %
MCH RBC QN AUTO: 31.7 PG (ref 26.6–33)
MCHC RBC AUTO-ENTMCNC: 33 G/DL (ref 31.5–35.7)
MCV RBC AUTO: 96 FL (ref 79–97)
MONOCYTES # BLD AUTO: 1.1 X10E3/UL (ref 0.1–0.9)
MONOCYTES NFR BLD AUTO: 13 %
NEUTROPHILS # BLD AUTO: 4.9 X10E3/UL (ref 1.4–7)
NEUTROPHILS NFR BLD AUTO: 56 %
PLATELET # BLD AUTO: 208 X10E3/UL (ref 150–450)
POTASSIUM SERPL-SCNC: 4.8 MMOL/L (ref 3.5–5.2)
PROT SERPL-MCNC: 7.4 G/DL (ref 6–8.5)
RBC # BLD AUTO: 4.01 X10E6/UL (ref 4.14–5.8)
SODIUM SERPL-SCNC: 138 MMOL/L (ref 134–144)
WBC # BLD AUTO: 8.8 X10E3/UL (ref 3.4–10.8)

## 2021-11-05 NOTE — PROGRESS NOTES
Tell patient that the blood count was only slightly below normal which should not cause symptoms.   The other blood work was all normal.

## 2021-11-06 ENCOUNTER — TELEPHONE (OUTPATIENT)
Dept: GASTROENTEROLOGY | Age: 76
End: 2021-11-06

## 2021-11-06 NOTE — TELEPHONE ENCOUNTER
I called patient, after verified , explained his bloodwork was only slightly below normal, should not cause a problem, all other labs normal. He said ok.

## 2022-01-05 ENCOUNTER — OFFICE VISIT (OUTPATIENT)
Dept: GASTROENTEROLOGY | Age: 77
End: 2022-01-05
Payer: MEDICARE

## 2022-01-05 VITALS
TEMPERATURE: 98 F | DIASTOLIC BLOOD PRESSURE: 60 MMHG | HEIGHT: 71 IN | SYSTOLIC BLOOD PRESSURE: 130 MMHG | RESPIRATION RATE: 18 BRPM | WEIGHT: 175.6 LBS | BODY MASS INDEX: 24.58 KG/M2 | HEART RATE: 65 BPM | OXYGEN SATURATION: 97 %

## 2022-01-05 DIAGNOSIS — K21.00 GASTROESOPHAGEAL REFLUX DISEASE WITH ESOPHAGITIS WITHOUT HEMORRHAGE: Primary | ICD-10-CM

## 2022-01-05 DIAGNOSIS — K22.2 STRICTURE OF ESOPHAGUS: ICD-10-CM

## 2022-01-05 DIAGNOSIS — R13.19 ESOPHAGEAL DYSPHAGIA: ICD-10-CM

## 2022-01-05 DIAGNOSIS — R10.12 LEFT UPPER QUADRANT ABDOMINAL PAIN: ICD-10-CM

## 2022-01-05 PROCEDURE — G8420 CALC BMI NORM PARAMETERS: HCPCS | Performed by: INTERNAL MEDICINE

## 2022-01-05 PROCEDURE — 1101F PT FALLS ASSESS-DOCD LE1/YR: CPT | Performed by: INTERNAL MEDICINE

## 2022-01-05 PROCEDURE — G8427 DOCREV CUR MEDS BY ELIG CLIN: HCPCS | Performed by: INTERNAL MEDICINE

## 2022-01-05 PROCEDURE — G8752 SYS BP LESS 140: HCPCS | Performed by: INTERNAL MEDICINE

## 2022-01-05 PROCEDURE — G8754 DIAS BP LESS 90: HCPCS | Performed by: INTERNAL MEDICINE

## 2022-01-05 PROCEDURE — 99213 OFFICE O/P EST LOW 20 MIN: CPT | Performed by: INTERNAL MEDICINE

## 2022-01-05 PROCEDURE — G8536 NO DOC ELDER MAL SCRN: HCPCS | Performed by: INTERNAL MEDICINE

## 2022-01-05 PROCEDURE — G8510 SCR DEP NEG, NO PLAN REQD: HCPCS | Performed by: INTERNAL MEDICINE

## 2022-01-05 NOTE — PROGRESS NOTES
Chief Complaint   Patient presents with    Follow-up     f/u GERD dysphagia      1. Have you been to the ER, urgent care clinic since your last visit? Hospitalized since your last visit? No    2. Have you seen or consulted any other health care providers outside of the [address] [place] [address] since your last visit? Include any pap smears or colon screening.  No   Visit Vitals  /60 (BP 1 Location: Right arm, BP Patient Position: Sitting, BP Cuff Size: Adult)   Pulse 65   Temp 98 °F (36.7 °C) (Temporal)   Resp 18   Ht 5' 11\" (1.803 m)   Wt 175 lb 9.6 oz (79.7 kg)   SpO2 97%   BMI 24.49 kg/m²

## 2022-01-06 NOTE — PROGRESS NOTES
Vu Dodson is a 68 y.o. male who presents today for the following:  Chief Complaint   Patient presents with    Follow-up     f/u GERD dysphagia          No Known Allergies    Current Outpatient Medications   Medication Sig    cranberry fruit extract (CRANBERRY EXTRACT PO) Take  by mouth daily.  hydroCHLOROthiazide (MICROZIDE) 12.5 mg capsule 12.5 mg daily.  lisinopriL (PRINIVIL, ZESTRIL) 40 mg tablet Take 40 mg by mouth daily.  NIFEdipine ER (PROCARDIA XL) 30 mg ER tablet Take 30 mg by mouth daily.  pantoprazole (PROTONIX) 40 mg tablet Take 40 mg by mouth daily. No current facility-administered medications for this visit.        Past Medical History:   Diagnosis Date    BPH (benign prostatic hyperplasia) 6/1/2021    Colon polyps 2007    RECTAL POLYP    Cyst of kidney, acquired     R KIDNEY 11-6-2010    Dysphagia 6/1/2021    Flatulence 6/1/2021    GERD (gastroesophageal reflux disease)     HTN (hypertension) 6/1/2021    Left adrenal mass ([address] 09/27/2004    ADENOMA  11-6-06    Reflux esophagitis 6/1/2021    Stricture of esophagus 6/1/2021       Past Surgical History:   Procedure Laterality Date    APPENDECTOMY,W OTHR C      COLONOSCOPY  10/01/2007    RECTAL, POLYP    COLONOSCOPY N/A 6/17/2021    COLONOSCOPY AND EGD (T I V A) performed by Cyrus [facility]cher [facility] at [facility] ENDOSCOPY    ENDOSCOPY VISIT-OUTPATIENT  07/10/2015    ENDOSCOPY VISIT-OUTPATIENT  10/01/2007    EGD, ED , COLONOSCOPY RECTAL POLYP    HX CATARACT REMOVAL Bilateral     9-, 10-25-05       Family History   Problem Relation Age of Onset    Heart Disease Mother     Hypertension Other     No Known Problems Father        Social History     Socioeconomic History    Marital status:      Spouse name: Not on file    Number of children: Not on file    Years of education: Not on file    Highest education level: Not on file   Occupational History    Not on file   Tobacco Use    Smoking status: Former Smoker  Smokeless tobacco: Never Used   Vaping Use    Vaping Use: Never used   Substance and Sexual Activity    Alcohol use: Yes     Alcohol/week: 5.0 standard drinks     Types: 1 Glasses of wine, 2 Cans of beer, 2 Shots of liquor per week     Comment: OCCASIONAL    Drug use: Never    Sexual activity: Not on file   Other Topics Concern    Not on file   Social History Narrative    Not on file     Social Determinants of Health     Financial Resource Strain:     Difficulty of Paying Living Expenses: Not on file   Food Insecurity:     Worried About Running Out of Food in the Last Year: Not on file    Hilda of Food in the Last Year: Not on file   Transportation Needs:     Lack of Transportation (Medical): Not on file    Lack of Transportation (Non-Medical): Not on file   Physical Activity:     Days of Exercise per Week: Not on file    Minutes of Exercise per Session: Not on file   Stress:     Feeling of Stress : Not on file   Social Connections:     Frequency of Communication with Friends and Family: Not on file    Frequency of Social Gatherings with Friends and Family: Not on file    Attends Yazdanism Services: Not on file    Active Member of [address] [place] [address] [employer] or Organizations: Not on file    Attends Club or Organization Meetings: Not on file    Marital Status: Not on file   Intimate Partner Violence:     Fear of Current or Ex-Partner: Not on file    Emotionally Abused: Not on file    Physically Abused: Not on file    Sexually Abused: Not on file   Housing Stability:     Unable to Pay for Housing in the Last Year: Not on file    Number of [place] in the Last Year: Not on file    Unstable Housing in the Last Year: Not on file         HPI  79-year-old male with history of hypertension, gastroesophageal reflux disease, esophageal stricture, dysphagia, and BPH who comes in for follow-up visit. Patient states he has pain in the back a lot now. He has spasms in the left flank area at times.   He states his bowel movements are regular usually every other day but not firm. He still has increased gas. He states his swallowing is doing okay now. Review of Systems   Constitutional: Negative. HENT: Negative. Negative for nosebleeds. Eyes: Negative. Respiratory: Negative. Cardiovascular: Negative. Gastrointestinal: Positive for abdominal pain and heartburn. Negative for blood in stool, constipation, diarrhea, melena, nausea and vomiting. Genitourinary: Negative. Musculoskeletal: Negative. Skin: Negative. Neurological: Negative. Endo/Heme/Allergies: Negative. Psychiatric/Behavioral: Negative. All other systems reviewed and are negative. Visit Vitals  /60 (BP 1 Location: Right arm, BP Patient Position: Sitting, BP Cuff Size: Adult)   Pulse 65   Temp 98 °F (36.7 °C) (Temporal)   Resp 18   Ht 5' 11\" (1.803 m)   Wt 79.7 kg (175 lb 9.6 oz)   SpO2 97%   BMI 24.49 kg/m²     Physical Exam  Vitals and nursing note reviewed. Constitutional:       Appearance: Normal appearance. He is normal weight. HENT:      Head: Normocephalic and atraumatic. Nose: Nose normal.      Mouth/Throat:      Mouth: Mucous membranes are moist.      Pharynx: Oropharynx is clear. Eyes:      General: No scleral icterus. Extraocular Movements: Extraocular movements intact. Conjunctiva/sclera: Conjunctivae normal.      Pupils: Pupils are equal, round, and reactive to light. Cardiovascular:      Rate and Rhythm: Normal rate and regular rhythm. Heart sounds: Normal heart sounds. Pulmonary:      Effort: Pulmonary effort is normal.      Breath sounds: Normal breath sounds. Abdominal:      General: Bowel sounds are normal. There is no distension. Palpations: Abdomen is soft. There is no mass. Tenderness: There is no abdominal tenderness. There is no right CVA tenderness, left CVA tenderness, guarding or rebound. Hernia: No hernia is present.    Musculoskeletal: General: Normal range of motion. Cervical back: Normal range of motion and neck supple. Skin:     General: Skin is warm and dry. Coloration: Skin is not jaundiced. Neurological:      General: No focal deficit present. Mental Status: He is alert and oriented to person, place, and time. Psychiatric:         Mood and Affect: Mood normal.         Behavior: Behavior normal.         Thought Content: Thought content normal.         Judgment: Judgment normal.            1. Gastroesophageal reflux disease with esophagitis without hemorrhage  Continue the pantoprazole 40 mg daily    2. Esophageal dysphagia  Improved    3. Stricture of esophagus      4. Left upper quadrant abdominal pain  Patient advised to increase fiber in diet. Also recommended Benefiber 1 dose twice daily.

## 2022-03-18 PROBLEM — R13.10 DYSPHAGIA: Status: ACTIVE | Noted: 2021-06-01

## 2022-03-18 PROBLEM — K22.2 STRICTURE OF ESOPHAGUS: Status: ACTIVE | Noted: 2021-06-01

## 2022-03-18 PROBLEM — K21.00 REFLUX ESOPHAGITIS: Status: ACTIVE | Noted: 2021-06-01

## 2022-03-19 PROBLEM — N40.0 BPH (BENIGN PROSTATIC HYPERPLASIA): Status: ACTIVE | Noted: 2021-06-01

## 2022-03-19 PROBLEM — R14.3 FLATULENCE: Status: ACTIVE | Noted: 2021-06-01

## 2022-03-19 PROBLEM — I10 HTN (HYPERTENSION): Status: ACTIVE | Noted: 2021-06-01

## 2022-12-06 ENCOUNTER — OFFICE VISIT (OUTPATIENT)
Dept: GASTROENTEROLOGY | Age: 77
End: 2022-12-06
Payer: MEDICARE

## 2022-12-06 VITALS
DIASTOLIC BLOOD PRESSURE: 60 MMHG | BODY MASS INDEX: 25.03 KG/M2 | HEART RATE: 72 BPM | HEIGHT: 71 IN | OXYGEN SATURATION: 98 % | SYSTOLIC BLOOD PRESSURE: 140 MMHG | TEMPERATURE: 97.9 F | RESPIRATION RATE: 14 BRPM | WEIGHT: 178.8 LBS

## 2022-12-06 DIAGNOSIS — K22.2 STRICTURE OF ESOPHAGUS: ICD-10-CM

## 2022-12-06 DIAGNOSIS — R13.19 ESOPHAGEAL DYSPHAGIA: Primary | ICD-10-CM

## 2022-12-06 DIAGNOSIS — K21.00 GASTROESOPHAGEAL REFLUX DISEASE WITH ESOPHAGITIS WITHOUT HEMORRHAGE: ICD-10-CM

## 2022-12-06 NOTE — PROGRESS NOTES
1. Have you been to the ER, urgent care clinic since your last visit? Hospitalized since your last visit? no    2. Have you seen or consulted any other health care providers outside of the [address] [place] [address] since your last visit? Include any pap smears or colon screening.   No   Chief Complaint   Patient presents with    Dysphagia     Problems swallowing    GERD     Visit Vitals  BP (!) 140/60 (BP 1 Location: Left upper arm, BP Patient Position: Sitting, BP Cuff Size: Adult)   Pulse 72   Temp 97.9 °F (36.6 °C) (Temporal)   Resp 14   Ht 5' 11\" (1.803 m)   Wt 81.1 kg (178 lb 12.8 oz)   SpO2 98%   BMI 24.94 kg/m²

## 2022-12-11 NOTE — H&P (VIEW-ONLY)
Lashonda Londono is a 68 y.o. male who presents today for the following:  Chief Complaint   Patient presents with    Dysphagia     Problems swallowing for few months. Had EGD, ED 6-    GERD         No Known Allergies    Current Outpatient Medications   Medication Sig    hydroCHLOROthiazide (MICROZIDE) 12.5 mg capsule 12.5 mg daily. lisinopriL (PRINIVIL, ZESTRIL) 40 mg tablet Take 40 mg by mouth daily. NIFEdipine ER (PROCARDIA XL) 30 mg ER tablet Take 30 mg by mouth daily. pantoprazole (PROTONIX) 40 mg tablet Take 40 mg by mouth daily. cranberry fruit extract (CRANBERRY EXTRACT PO) Take  by mouth daily. No current facility-administered medications for this visit.        Past Medical History:   Diagnosis Date    BPH (benign prostatic hyperplasia) 6/1/2021    Colon polyps 2007    RECTAL POLYP    Cyst of kidney, acquired     R KIDNEY 11-6-2010    Dysphagia 6/1/2021    Flatulence 6/1/2021    GERD (gastroesophageal reflux disease)     HTN (hypertension) 6/1/2021    Left adrenal mass ([address] 09/27/2004    ADENOMA  11-6-06    Reflux esophagitis 6/1/2021    Stricture of esophagus 6/1/2021       Past Surgical History:   Procedure Laterality Date    APPENDECTOMY,W OTHR C      COLONOSCOPY  10/01/2007    RECTAL, POLYP    COLONOSCOPY N/A 6/17/2021    COLONOSCOPY AND EGD (T I V A) performed by Bella Manning MD at [facility] ENDOSCOPY    ENDOSCOPY VISIT-OUTPATIENT  07/10/2015    ENDOSCOPY VISIT-OUTPATIENT  10/01/2007    EGD, ED , COLONOSCOPY RECTAL POLYP    HX CATARACT REMOVAL Bilateral     9-, 10-25-05       Family History   Problem Relation Age of Onset    Heart Disease Mother     Hypertension Other     No Known Problems Father        Social History     Socioeconomic History    Marital status:      Spouse name: Not on file    Number of children: Not on file    Years of education: Not on file    Highest education level: Not on file   Occupational History    Not on file   Tobacco Use    Smoking status: Former    Smokeless tobacco: Never   Vaping Use    Vaping Use: Never used   Substance and Sexual Activity    Alcohol use: Yes     Alcohol/week: 5.0 standard drinks     Types: 1 Glasses of wine, 2 Cans of beer, 2 Shots of liquor per week     Comment: OCCASIONAL    Drug use: Never    Sexual activity: Not on file   Other Topics Concern    Not on file   Social History Narrative    Not on file     Social Determinants of Health     Financial Resource Strain: Not on file   Food Insecurity: Not on file   Transportation Needs: Not on file   Physical Activity: Not on file   Stress: Not on file   Social Connections: Not on file   Intimate Partner Violence: Not on file   Housing Stability: Not on file         22-year-old male with history of hypertension, gastroesophageal reflux disease, esophageal stricture, dysphagia, and BPH who comes in for evaluation of the dysphagia. He last had a EGD which showed a lower esophageal stricture, distal esophagitis grade 2, and antral gastritis. Patient states his swallowing is getting worse. A lot of problem but most meats must drink a lot of fluids. Symptoms are getting worse. He occasionally has regurgitate food back up. He does take pantoprazole daily. Dysphagia  Pertinent negatives include no abdominal pain. Review of Systems   Constitutional: Negative. HENT: Negative. Negative for nosebleeds. Eyes: Negative. Respiratory: Negative. Cardiovascular: Negative. Gastrointestinal:  Positive for heartburn. Negative for abdominal pain, blood in stool, constipation, diarrhea, melena, nausea and vomiting. Genitourinary: Negative. Musculoskeletal: Negative. Skin: Negative. Neurological: Negative. Endo/Heme/Allergies: Negative. Psychiatric/Behavioral: Negative. All other systems reviewed and are negative.       Visit Vitals  BP (!) 140/60 (BP 1 Location: Left upper arm, BP Patient Position: Sitting, BP Cuff Size: Adult)   Pulse 72   Temp 97.9 °F (36.6 °C) (Temporal)   Resp 14   Ht 5' 11\" (1.803 m)   Wt 81.1 kg (178 lb 12.8 oz)   SpO2 98%   BMI 24.94 kg/m²     Physical Exam  Vitals and nursing note reviewed. Constitutional:       Appearance: Normal appearance. He is normal weight. HENT:      Head: Normocephalic and atraumatic. Nose: Nose normal.      Mouth/Throat:      Mouth: Mucous membranes are moist.      Pharynx: Oropharynx is clear. Eyes:      General: No scleral icterus. Extraocular Movements: Extraocular movements intact. Conjunctiva/sclera: Conjunctivae normal.      Pupils: Pupils are equal, round, and reactive to light. Cardiovascular:      Rate and Rhythm: Normal rate and regular rhythm. Heart sounds: Normal heart sounds. Pulmonary:      Effort: Pulmonary effort is normal.      Breath sounds: Normal breath sounds. Abdominal:      General: Bowel sounds are normal. There is no distension. Palpations: Abdomen is soft. There is no mass. Tenderness: There is no abdominal tenderness. There is no right CVA tenderness, left CVA tenderness, guarding or rebound. Hernia: No hernia is present. Musculoskeletal:         General: Normal range of motion. Cervical back: Normal range of motion and neck supple. Skin:     General: Skin is warm and dry. Coloration: Skin is not jaundiced. Neurological:      General: No focal deficit present. Mental Status: He is alert and oriented to person, place, and time. Psychiatric:         Mood and Affect: Mood normal.         Behavior: Behavior normal.         Thought Content: Thought content normal.         Judgment: Judgment normal.          1. Esophageal dysphagia  We will schedule patient for an EGD with esophageal dilatation.  - UPPER GI ENDOSCOPY,DIAGNOSIS; Future  - FL DILATE ESOPHAGUS    2. Gastroesophageal reflux disease with esophagitis without hemorrhage  Continue the pantoprazole 40 mg daily    3.  Stricture of esophagus  Underlying cause of the dysphagia.

## 2022-12-11 NOTE — PROGRESS NOTES
Denae Moctezuma is a 68 y.o. male who presents today for the following:  Chief Complaint   Patient presents with    Dysphagia     Problems swallowing for few months. Had EGD, ED 6-    GERD         No Known Allergies    Current Outpatient Medications   Medication Sig    hydroCHLOROthiazide (MICROZIDE) 12.5 mg capsule 12.5 mg daily. lisinopriL (PRINIVIL, ZESTRIL) 40 mg tablet Take 40 mg by mouth daily. NIFEdipine ER (PROCARDIA XL) 30 mg ER tablet Take 30 mg by mouth daily. pantoprazole (PROTONIX) 40 mg tablet Take 40 mg by mouth daily. cranberry fruit extract (CRANBERRY EXTRACT PO) Take  by mouth daily. No current facility-administered medications for this visit.        Past Medical History:   Diagnosis Date    BPH (benign prostatic hyperplasia) 6/1/2021    Colon polyps 2007    RECTAL POLYP    Cyst of kidney, acquired     R KIDNEY 11-6-2010    Dysphagia 6/1/2021    Flatulence 6/1/2021    GERD (gastroesophageal reflux disease)     HTN (hypertension) 6/1/2021    Left adrenal mass ([address] 09/27/2004    ADENOMA  11-6-06    Reflux esophagitis 6/1/2021    Stricture of esophagus 6/1/2021       Past Surgical History:   Procedure Laterality Date    APPENDECTOMY,W OTHR C      COLONOSCOPY  10/01/2007    RECTAL, POLYP    COLONOSCOPY N/A 6/17/2021    COLONOSCOPY AND EGD (T I V A) performed by Annie Ji MD at [facility] ENDOSCOPY    ENDOSCOPY VISIT-OUTPATIENT  07/10/2015    ENDOSCOPY VISIT-OUTPATIENT  10/01/2007    EGD, ED , COLONOSCOPY RECTAL POLYP    HX CATARACT REMOVAL Bilateral     9-, 10-25-05       Family History   Problem Relation Age of Onset    Heart Disease Mother     Hypertension Other     No Known Problems Father        Social History     Socioeconomic History    Marital status:      Spouse name: Not on file    Number of children: Not on file    Years of education: Not on file    Highest education level: Not on file   Occupational History    Not on file   Tobacco Use    Smoking status: Former    Smokeless tobacco: Never   Vaping Use    Vaping Use: Never used   Substance and Sexual Activity    Alcohol use: Yes     Alcohol/week: 5.0 standard drinks     Types: 1 Glasses of wine, 2 Cans of beer, 2 Shots of liquor per week     Comment: OCCASIONAL    Drug use: Never    Sexual activity: Not on file   Other Topics Concern    Not on file   Social History Narrative    Not on file     Social Determinants of Health     Financial Resource Strain: Not on file   Food Insecurity: Not on file   Transportation Needs: Not on file   Physical Activity: Not on file   Stress: Not on file   Social Connections: Not on file   Intimate Partner Violence: Not on file   Housing Stability: Not on file         22-year-old male with history of hypertension, gastroesophageal reflux disease, esophageal stricture, dysphagia, and BPH who comes in for evaluation of the dysphagia. He last had a EGD which showed a lower esophageal stricture, distal esophagitis grade 2, and antral gastritis. Patient states his swallowing is getting worse. A lot of problem but most meats must drink a lot of fluids. Symptoms are getting worse. He occasionally has regurgitate food back up. He does take pantoprazole daily. Dysphagia  Pertinent negatives include no abdominal pain. Review of Systems   Constitutional: Negative. HENT: Negative. Negative for nosebleeds. Eyes: Negative. Respiratory: Negative. Cardiovascular: Negative. Gastrointestinal:  Positive for heartburn. Negative for abdominal pain, blood in stool, constipation, diarrhea, melena, nausea and vomiting. Genitourinary: Negative. Musculoskeletal: Negative. Skin: Negative. Neurological: Negative. Endo/Heme/Allergies: Negative. Psychiatric/Behavioral: Negative. All other systems reviewed and are negative.       Visit Vitals  BP (!) 140/60 (BP 1 Location: Left upper arm, BP Patient Position: Sitting, BP Cuff Size: Adult)   Pulse 72   Temp 97.9 °F (36.6 °C) (Temporal)   Resp 14   Ht 5' 11\" (1.803 m)   Wt 81.1 kg (178 lb 12.8 oz)   SpO2 98%   BMI 24.94 kg/m²     Physical Exam  Vitals and nursing note reviewed. Constitutional:       Appearance: Normal appearance. He is normal weight. HENT:      Head: Normocephalic and atraumatic. Nose: Nose normal.      Mouth/Throat:      Mouth: Mucous membranes are moist.      Pharynx: Oropharynx is clear. Eyes:      General: No scleral icterus. Extraocular Movements: Extraocular movements intact. Conjunctiva/sclera: Conjunctivae normal.      Pupils: Pupils are equal, round, and reactive to light. Cardiovascular:      Rate and Rhythm: Normal rate and regular rhythm. Heart sounds: Normal heart sounds. Pulmonary:      Effort: Pulmonary effort is normal.      Breath sounds: Normal breath sounds. Abdominal:      General: Bowel sounds are normal. There is no distension. Palpations: Abdomen is soft. There is no mass. Tenderness: There is no abdominal tenderness. There is no right CVA tenderness, left CVA tenderness, guarding or rebound. Hernia: No hernia is present. Musculoskeletal:         General: Normal range of motion. Cervical back: Normal range of motion and neck supple. Skin:     General: Skin is warm and dry. Coloration: Skin is not jaundiced. Neurological:      General: No focal deficit present. Mental Status: He is alert and oriented to person, place, and time. Psychiatric:         Mood and Affect: Mood normal.         Behavior: Behavior normal.         Thought Content: Thought content normal.         Judgment: Judgment normal.          1. Esophageal dysphagia  We will schedule patient for an EGD with esophageal dilatation.  - UPPER GI ENDOSCOPY,DIAGNOSIS; Future  - MD DILATE ESOPHAGUS    2. Gastroesophageal reflux disease with esophagitis without hemorrhage  Continue the pantoprazole 40 mg daily    3.  Stricture of esophagus  Underlying cause of the dysphagia.

## 2022-12-13 ENCOUNTER — ANESTHESIA (OUTPATIENT)
Dept: ENDOSCOPY | Age: 77
End: 2022-12-13
Payer: MEDICARE

## 2022-12-13 ENCOUNTER — HOSPITAL ENCOUNTER (OUTPATIENT)
Age: 77
Setting detail: OUTPATIENT SURGERY
Discharge: HOME | End: 2022-12-13
Attending: INTERNAL MEDICINE | Admitting: INTERNAL MEDICINE
Payer: MEDICARE

## 2022-12-13 ENCOUNTER — ANESTHESIA EVENT (OUTPATIENT)
Dept: ENDOSCOPY | Age: 77
End: 2022-12-13
Payer: MEDICARE

## 2022-12-13 VITALS
BODY MASS INDEX: 23.43 KG/M2 | DIASTOLIC BLOOD PRESSURE: 63 MMHG | SYSTOLIC BLOOD PRESSURE: 115 MMHG | TEMPERATURE: 97.9 F | HEIGHT: 72 IN | OXYGEN SATURATION: 96 % | RESPIRATION RATE: 18 BRPM | HEART RATE: 71 BPM | WEIGHT: 173 LBS

## 2022-12-13 PROCEDURE — 2709999900 HC NON-CHARGEABLE SUPPLY: Performed by: INTERNAL MEDICINE

## 2022-12-13 PROCEDURE — 74011250636 HC RX REV CODE- 250/636

## 2022-12-13 PROCEDURE — 77030021593 HC FCPS BIOP ENDOSC BSC -A: Performed by: INTERNAL MEDICINE

## 2022-12-13 PROCEDURE — 88305 TISSUE EXAM BY PATHOLOGIST: CPT

## 2022-12-13 PROCEDURE — 76060000032 HC ANESTHESIA 0.5 TO 1 HR: Performed by: INTERNAL MEDICINE

## 2022-12-13 PROCEDURE — C1726 CATH, BAL DIL, NON-VASCULAR: HCPCS | Performed by: INTERNAL MEDICINE

## 2022-12-13 PROCEDURE — 74011250636 HC RX REV CODE- 250/636: Performed by: NURSE ANESTHETIST, CERTIFIED REGISTERED

## 2022-12-13 PROCEDURE — 74011000250 HC RX REV CODE- 250: Performed by: NURSE ANESTHETIST, CERTIFIED REGISTERED

## 2022-12-13 PROCEDURE — 74011250636 HC RX REV CODE- 250/636: Performed by: INTERNAL MEDICINE

## 2022-12-13 PROCEDURE — 74011000250 HC RX REV CODE- 250

## 2022-12-13 PROCEDURE — 76040000007: Performed by: INTERNAL MEDICINE

## 2022-12-13 RX ORDER — SODIUM CHLORIDE 0.9 % (FLUSH) 0.9 %
5-40 SYRINGE (ML) INJECTION AS NEEDED
Status: DISCONTINUED | OUTPATIENT
Start: 2022-12-13 | End: 2022-12-13 | Stop reason: HOSPADM

## 2022-12-13 RX ORDER — SODIUM CHLORIDE 0.9 % (FLUSH) 0.9 %
5-40 SYRINGE (ML) INJECTION EVERY 8 HOURS
Status: DISCONTINUED | OUTPATIENT
Start: 2022-12-13 | End: 2022-12-13 | Stop reason: HOSPADM

## 2022-12-13 RX ORDER — SODIUM CHLORIDE 9 MG/ML
INJECTION, SOLUTION INTRAVENOUS
Status: DISCONTINUED | OUTPATIENT
Start: 2022-12-13 | End: 2022-12-13 | Stop reason: HOSPADM

## 2022-12-13 RX ORDER — SODIUM CHLORIDE 9 MG/ML
125 INJECTION, SOLUTION INTRAVENOUS CONTINUOUS
Status: DISCONTINUED | OUTPATIENT
Start: 2022-12-13 | End: 2022-12-13 | Stop reason: HOSPADM

## 2022-12-13 RX ORDER — PROPOFOL 10 MG/ML
INJECTION, EMULSION INTRAVENOUS AS NEEDED
Status: DISCONTINUED | OUTPATIENT
Start: 2022-12-13 | End: 2022-12-13 | Stop reason: HOSPADM

## 2022-12-13 RX ORDER — LABETALOL HYDROCHLORIDE 5 MG/ML
INJECTION, SOLUTION INTRAVENOUS AS NEEDED
Status: DISCONTINUED | OUTPATIENT
Start: 2022-12-13 | End: 2022-12-13 | Stop reason: HOSPADM

## 2022-12-13 RX ORDER — GLYCOPYRROLATE 0.2 MG/ML
INJECTION INTRAMUSCULAR; INTRAVENOUS AS NEEDED
Status: DISCONTINUED | OUTPATIENT
Start: 2022-12-13 | End: 2022-12-13 | Stop reason: HOSPADM

## 2022-12-13 RX ADMIN — SODIUM CHLORIDE: 9 INJECTION, SOLUTION INTRAVENOUS at 12:35

## 2022-12-13 RX ADMIN — LABETALOL HYDROCHLORIDE 10 MG: 5 INJECTION, SOLUTION INTRAVENOUS at 12:55

## 2022-12-13 RX ADMIN — PROPOFOL 50 MG: 10 INJECTION, EMULSION INTRAVENOUS at 12:58

## 2022-12-13 RX ADMIN — PROPOFOL 50 MG: 10 INJECTION, EMULSION INTRAVENOUS at 13:04

## 2022-12-13 RX ADMIN — GLYCOPYRROLATE 0.2 MG: 0.2 INJECTION, SOLUTION INTRAMUSCULAR; INTRAVENOUS at 12:35

## 2022-12-13 RX ADMIN — TOPICAL ANESTHETIC 1 SPRAY: 200 SPRAY DENTAL; PERIODONTAL at 12:35

## 2022-12-13 RX ADMIN — PROPOFOL 50 MG: 10 INJECTION, EMULSION INTRAVENOUS at 12:49

## 2022-12-13 NOTE — DISCHARGE INSTRUCTIONS

## 2022-12-13 NOTE — ANESTHESIA PREPROCEDURE EVALUATION
Relevant Problems   No relevant active problems       Anesthetic History   No history of anesthetic complications  Other anesthesia complications          Review of Systems / Medical History  Patient summary reviewed, nursing notes reviewed and pertinent labs reviewed    Pulmonary  Within defined limits                 Neuro/Psych   Within defined limits           Cardiovascular    Hypertension                   GI/Hepatic/Renal     GERD           Endo/Other  Within defined limits           Other Findings              Physical Exam    Airway  Mallampati: II  TM Distance: 4 - 6 cm  Neck ROM: normal range of motion        Cardiovascular    Rhythm: regular  Rate: normal         Dental  No notable dental hx       Pulmonary  Breath sounds clear to auscultation               Abdominal  Abdominal exam normal       Other Findings            Anesthetic Plan    ASA: 2  Anesthesia type: total IV anesthesia            Anesthetic plan and risks discussed with: Patient

## 2022-12-13 NOTE — ANESTHESIA POSTPROCEDURE EVALUATION
Procedure(s):  ESOPHAGOGASTRODUODENOSCOPY (EGD) WITH ESOPHAGEAL DILATATION.     total IV anesthesia    Anesthesia Post Evaluation      Multimodal analgesia: multimodal analgesia not used between 6 hours prior to anesthesia start to PACU discharge  Patient location during evaluation: PACU  Patient participation: complete - patient participated  Level of consciousness: sleepy but conscious  Pain management: adequate  Anesthetic complications: no  Cardiovascular status: acceptable and stable  Respiratory status: acceptable and room air  Hydration status: acceptable  Post anesthesia nausea and vomiting:  none  Final Post Anesthesia Temperature Assessment:  Normothermia (36.0-37.5 degrees C)      INITIAL Post-op Vital signs:   Vitals Value Taken Time   /59 12/13/22 1314   Temp 36.6 °C (97.9 °F) 12/13/22 1314   Pulse 72 12/13/22 1314   Resp 18 12/13/22 1314   SpO2 94 % 12/13/22 1314

## 2022-12-13 NOTE — INTERVAL H&P NOTE
Update History & Physical    The Patient's History and Physical of December 13, 2022 was reviewed with the patient and I examined the patient. There was no change. The surgical site was confirmed by the patient and me. Plan:  The risk, benefits, expected outcome, and alternative to the recommended procedure have been discussed with the patient. Patient understands and wants to proceed with the procedure.     Electronically signed by Thais Closs, MD on 12/13/2022 at 10:05 AM

## 2022-12-13 NOTE — OP NOTES
EGD Procedure Note        Patient: Jillian Gutierrez MRN: [id number]  SSN: [id number]    YOB: 1945  Age: 68 y.o. Sex: male        Date/Time:  12/13/2022 1:10 PM         IMPRESSION:       Lower esophageal stricture  Distal esophagitis (grade 2)       RECOMMENDATIONS:    Check biopsy results. Repeat esophageal dilatation as needed. Continue the pantoprazole 40 mg daily    Procedure: Esophagogastroduodenoscopy with cold biopsies                      Esophageal dilatation (pneumatic)    Indication: Esophageal dysphagia    Endoscopist:  Sailaja Malone MD    Referring Provider:   Clara Mays NP    History: The history and physical exam were reviewed and updated. Endoscope: GIF H190 Olympus video endoscope    Extent of Exam: second portion of the duodenum    ASA: ASA 2 - Patient with mild systemic disease with no functional limitations    Anethesia/Sedation:  TIVA    Description of the procedure: The procedure was discussed with the patient including risks, benefits, alternatives including risks of iv sedation, bleeding, perforation and aspiration. A safety timeout was performed. The patient was placed in the left lateral decubitus position. A bite block was placed. The patient was using standard protocol. The patients vital signs were monitored at all times including heart rate/rhythm, blood pressure and oxygen saturation. The endoscope was then passed under direct visualization to the second portion of the duodenum. The endoscope was then slowly withdrawn while visualizing the mucosa. In the stomach a retroflexion was performed and gastric fundus and cardia visualized. The patient was then transferred to recovery in stable condition. Findings:   Esophagus: The esophageal mucosa was inflamed in the distal esophagus with presence of a lower esophageal stricture. We had to use some pressure when we entered with the scope for to go through the stricture.   This was the area that was later dilated. .  Stomach: The gastric mucosa was normal with no ulceration, mass, stricture. Duodenum: The duodenum mucosa was normal with no ulceration, mass, stricture and no evidence of villous atrophy. Therapies: Esophageal dilatation with the TTS pneumatic dilator. The balloon was inserted through the endoscope and taken across the stricture was in the distal esophagus. The balloon was then inflated up to 7 maynor of pressure which brought the balloon to a 54 [place] Kathy size. The balloon was kept in place for 90 seconds and then deflated. This process was repeated once again using the same parameters. Patient tolerated the procedure well. Specimens:   ID Type Source Tests Collected by Time Destination   1 : mucosa Preservative Esophagus, Distal  Laurie Turcios MD 12/13/2022 1252 Pathology        Assistants:   Circ-1: Rosy Kelley  Scrub Tech-1: Hakan Gracia    EBL:Minimal    Complications:   None; patient tolerated the procedure well.      Implants: None    Discharge disposition:  Out of the recovery area when discharge criteria met         Anita Small MD  December 13, 2022  1:10 PM

## 2022-12-21 NOTE — PROGRESS NOTES
Tell patient that the biopsies taken and the lower esophagus showed changes associated with acid reflux. The biopsies in the stomach showed gastritis/inflammation. No infection was noted. Should continue the pantoprazole 40 mg daily. Repeat esophageal dilatation as needed.

## 2023-01-10 ENCOUNTER — TELEPHONE (OUTPATIENT)
Dept: GASTROENTEROLOGY | Age: 78
End: 2023-01-10

## 2023-01-11 ENCOUNTER — TELEPHONE (OUTPATIENT)
Dept: GASTROENTEROLOGY | Age: 78
End: 2023-01-11

## 2023-01-11 NOTE — TELEPHONE ENCOUNTER
Patient returned call, I explained the test showed gastritis and irritation in his esophagus. No infection. Cont pantoprazole, and follow GERD instructions.  He said ok

## 2023-01-11 NOTE — TELEPHONE ENCOUNTER
----- Message from Yayo Rucker MD sent at 12/21/2022  1:03 PM EST -----  Tell patient that the biopsies taken and the lower esophagus showed changes associated with acid reflux. The biopsies in the stomach showed gastritis/inflammation. No infection was noted. Should continue the pantoprazole 40 mg daily. Repeat esophageal dilatation as needed.

## 2023-04-21 ENCOUNTER — DOCUMENTATION ONLY (OUTPATIENT)
Dept: SURGERY | Age: 78
End: 2023-04-21

## 2023-04-21 NOTE — PROGRESS NOTES
Called Dr. Atkins Hondo office to request that a new referral be sent over with Dr. Neftali Castle name. Patient will be seeing Dr. Rodri Luque on Mon April 24th. No answer. Left a voicemail on two different extensions. Please advise.

## 2023-04-24 ENCOUNTER — OFFICE VISIT (OUTPATIENT)
Dept: SURGERY | Age: 78
End: 2023-04-24
Payer: MEDICARE

## 2023-04-24 VITALS
WEIGHT: 172.4 LBS | BODY MASS INDEX: 23.35 KG/M2 | TEMPERATURE: 97.4 F | HEIGHT: 72 IN | OXYGEN SATURATION: 99 % | RESPIRATION RATE: 16 BRPM | SYSTOLIC BLOOD PRESSURE: 150 MMHG | DIASTOLIC BLOOD PRESSURE: 51 MMHG | HEART RATE: 71 BPM

## 2023-04-24 DIAGNOSIS — K40.90 LEFT INGUINAL HERNIA: Primary | ICD-10-CM

## 2023-04-24 DIAGNOSIS — Z01.818 PREOP EXAMINATION: ICD-10-CM

## 2023-04-24 PROCEDURE — 3078F DIAST BP <80 MM HG: CPT | Performed by: SURGERY

## 2023-04-24 PROCEDURE — 3074F SYST BP LT 130 MM HG: CPT | Performed by: SURGERY

## 2023-04-24 PROCEDURE — 99203 OFFICE O/P NEW LOW 30 MIN: CPT | Performed by: SURGERY

## 2023-04-24 PROCEDURE — 1123F ACP DISCUSS/DSCN MKR DOCD: CPT | Performed by: SURGERY

## 2023-04-24 NOTE — PROGRESS NOTES
[address] MD Ciera  [address] [place] [address], [address] [place] [address], [address] [place] [address]  [phone]      Patient Name: Mitchell Bocanegra (89 y.o., male)    Patient Address: [address] [place] [address],[id number]    PCP: Johanny De La Torre NP     Patient contact numbers:     Home Phone  Work Phone  Mobile    [phone]       Chief Complaint   Patient presents with    New Patient    Hernia (Non Specific)     Pain comes and go, per patient    Swelling and pain in left groin      History of present illness    This is a 68year old male who presents with Swelling in left groin for approximately 6 month to 1 year. He states its associated with Intermittent light discomfort/ pain. No pain present today. He says it reduces on its own. No history of similar symptoms in the past. No known alleviating or aggravating factors when discomfort/pain presents. Usually only last a few minutes and resolves on its own without any intervention. He denies nausea, vomiting, diarrhea, constipation, chest pain or shortness of breath, brbpr, melena. No dysuria, urgency, frequency. No fever or chills. No straining to urinatre  No hx of MI. Can walk >1 mile without chest pain or shortness of breath.           Past Medical History:   Diagnosis Date    BPH (benign prostatic hyperplasia) 06/01/2021    Colon polyps 2007    RECTAL POLYP    Cyst of kidney, acquired     R KIDNEY 11-6-2010    Dysphagia 06/01/2021    Flatulence 06/01/2021    GERD (gastroesophageal reflux disease)     HTN (hypertension) 06/01/2021    Left adrenal mass ([facility] [address] 09/27/2004    ADENOMA  11-6-06    Reflux esophagitis 06/01/2021    Stricture of esophagus 06/01/2021       Past Surgical History:   Procedure Laterality Date    APPENDECTOMY,W OTHR C      COLONOSCOPY  10/01/2007    RECTAL, POLYP    COLONOSCOPY N/A 6/17/2021    COLONOSCOPY AND EGD (T I V A) performed by Casandra King MD at [facility] ENDOSCOPY    ENDOSCOPY VISIT-OUTPATIENT  07/10/2015 ENDOSCOPY VISIT-OUTPATIENT  10/01/2007    EGD, ED , COLONOSCOPY RECTAL POLYP    HX CATARACT REMOVAL Bilateral     2005, 10-25-05       Family History   Problem Relation Age of Onset    Heart Disease Mother     Hypertension Other     No Known Problems Father        Social History     Tobacco Use    Smoking status: Former     Packs/day: 1.00     Years: 30.00     Pack years: 30.00     Types: Cigarettes     Quit date:      Years since quittin.3     Passive exposure: Past    Smokeless tobacco: Never   Vaping Use    Vaping Use: Never used   Substance Use Topics    Alcohol use: Yes     Alcohol/week: 5.0 standard drinks     Types: 1 Glasses of wine, 2 Cans of beer, 2 Shots of liquor per week     Comment: OCCASIONAL    Drug use: Never     Quit smoking . Rare alcohol no illicit drug use     No Known Allergies    Current Outpatient Medications   Medication Sig    cranberry fruit extract (CRANBERRY EXTRACT PO) Take  by mouth daily. hydroCHLOROthiazide (MICROZIDE) 12.5 mg capsule 1 Capsule daily. lisinopriL (PRINIVIL, ZESTRIL) 40 mg tablet Take 1 Tablet by mouth daily. NIFEdipine ER (PROCARDIA XL) 30 mg ER tablet Take 1 Tablet by mouth daily. pantoprazole (PROTONIX) 40 mg tablet Take 1 Tablet by mouth daily. No current facility-administered medications for this visit. Review of Systems   Constitutional:  Negative for chills, fever and malaise/fatigue. HENT:  Negative for congestion, ear discharge, ear pain, hearing loss, sinus pain and sore throat. Eyes:  Negative for blurred vision, double vision, pain and discharge. Respiratory:  Negative for cough, sputum production, shortness of breath and wheezing. Cardiovascular:  Negative for chest pain and palpitations. Gastrointestinal:  Negative for abdominal pain, blood in stool, constipation, diarrhea, heartburn, melena, nausea and vomiting. Genitourinary:  Negative for dysuria, frequency and urgency.    Musculoskeletal:  Negative for back pain, joint pain, myalgias and neck pain. Skin:  Negative for itching and rash. Neurological:  Negative for dizziness, sensory change, weakness and headaches. Psychiatric/Behavioral:  Negative for depression and suicidal ideas. The patient is not nervous/anxious. Physical Exam  Visit Vitals  BP (!) 150/51 (BP 1 Location: Left upper arm, BP Patient Position: Sitting)   Pulse 71   Temp 97.4 °F (36.3 °C) (Temporal)   Resp 16   Ht 6' (1.829 m)   Wt 172 lb 6.4 oz (78.2 kg)   SpO2 99%   BMI 23.38 kg/m²       Physical Exam  Constitutional:       General: He is not in acute distress. Appearance: Normal appearance. He is not ill-appearing. HENT:      Head: Normocephalic and atraumatic. Nose: Nose normal.   Cardiovascular:      Rate and Rhythm: Normal rate. Pulmonary:      Effort: Pulmonary effort is normal.   Abdominal:      General: There is no distension. Palpations: Abdomen is soft. Tenderness: There is no abdominal tenderness. There is no guarding. Comments: RLQ scar appendectomy healed,   left reducible non tender inguinal hernia   Musculoskeletal:         General: Normal range of motion. Skin:     General: Skin is warm and dry. Capillary Refill: Capillary refill takes less than 2 seconds. Neurological:      Mental Status: He is alert and oriented to person, place, and time. Psychiatric:         Mood and Affect: Mood normal.         Behavior: Behavior normal.         Thought Content: Thought content normal.         Judgment: Judgment normal.            Assessment  Problem List Items Addressed This Visit    None  Visit Diagnoses       Left inguinal hernia    -  Primary                Plan  Recommended left inguinal hernia repair to the patient.   I explained risks, benefits and alternatives (including observation) of left inguinal hernia repair to the patient  Will schedule patient for left inguinal hernia repair      Benoit Pastor MD

## 2023-04-24 NOTE — PROGRESS NOTES
Identified pt with two pt identifiers (name and ). Reviewed chart in preparation for visit and have obtained necessary documentation.     Deb Fletcher is a 68 y.o. male  Chief Complaint   Patient presents with    New Patient    Hernia (Non Specific)     Pain comes and go, per patient     Visit Vitals  BP (!) 150/51 (BP 1 Location: Left upper arm, BP Patient Position: Sitting)   Pulse 71   Temp 97.4 °F (36.3 °C) (Temporal)   Resp 16   Ht 6' (1.829 m)   Wt 172 lb 6.4 oz (78.2 kg)   SpO2 99%   BMI 23.38 kg/m²

## 2023-05-09 ENCOUNTER — TELEPHONE (OUTPATIENT)
Age: 78
End: 2023-05-09

## 2023-05-09 NOTE — TELEPHONE ENCOUNTER
Spoke to Monique and made her aware she puts her own orders in.  She is going to page her and see if she can get hold of her

## 2023-05-09 NOTE — TELEPHONE ENCOUNTER
Tawana, with PAT would like request orders be signed, dated, and timed for the patient to come in for PAT tomorrow. She states that there is no order on file. Please call if necessary.  Phone: [phone]

## 2023-05-10 ENCOUNTER — HOSPITAL ENCOUNTER (OUTPATIENT)
Facility: HOSPITAL | Age: 78
Discharge: HOME | End: 2023-05-13
Payer: MEDICARE

## 2023-05-10 VITALS
BODY MASS INDEX: 24.14 KG/M2 | WEIGHT: 172.4 LBS | OXYGEN SATURATION: 100 % | RESPIRATION RATE: 16 BRPM | TEMPERATURE: 98.3 F | HEIGHT: 71 IN | SYSTOLIC BLOOD PRESSURE: 143 MMHG | HEART RATE: 68 BPM | DIASTOLIC BLOOD PRESSURE: 62 MMHG

## 2023-05-10 LAB
ANION GAP SERPL CALC-SCNC: 5 MMOL/L (ref 5–15)
BUN SERPL-MCNC: 24 MG/DL (ref 6–20)
BUN/CREAT SERPL: 20 (ref 12–20)
CA-I BLD-MCNC: 8.8 MG/DL (ref 8.5–10.1)
CHLORIDE SERPL-SCNC: 106 MMOL/L (ref 97–108)
CO2 SERPL-SCNC: 27 MMOL/L (ref 21–32)
CREAT SERPL-MCNC: 1.23 MG/DL (ref 0.7–1.3)
EKG ATRIAL RATE: 56 BPM
EKG DIAGNOSIS: NORMAL
EKG P AXIS: 0 DEGREES
EKG P-R INTERVAL: 198 MS
EKG Q-T INTERVAL: 450 MS
EKG QRS DURATION: 92 MS
EKG QTC CALCULATION (BAZETT): 434 MS
EKG R AXIS: 76 DEGREES
EKG T AXIS: 78 DEGREES
EKG VENTRICULAR RATE: 56 BPM
ERYTHROCYTE [DISTWIDTH] IN BLOOD BY AUTOMATED COUNT: 12.1 % (ref 11.5–14.5)
GLUCOSE SERPL-MCNC: 94 MG/DL (ref 65–100)
HCT VFR BLD AUTO: 34.6 % (ref 36.6–50.3)
HGB BLD-MCNC: 11 G/DL (ref 12.1–17)
MAGNESIUM SERPL-MCNC: 1.9 MG/DL (ref 1.6–2.4)
MCH RBC QN AUTO: 31.4 PG (ref 26–34)
MCHC RBC AUTO-ENTMCNC: 31.8 G/DL (ref 30–36.5)
MCV RBC AUTO: 98.9 FL (ref 80–99)
NRBC # BLD: 0 K/UL (ref 0–0.01)
NRBC BLD-RTO: 0 PER 100 WBC
PHOSPHATE SERPL-MCNC: 3.3 MG/DL (ref 2.6–4.7)
PLATELET # BLD AUTO: 169 K/UL (ref 150–400)
PMV BLD AUTO: 11.4 FL (ref 8.9–12.9)
POTASSIUM SERPL-SCNC: 4.7 MMOL/L (ref 3.5–5.1)
RBC # BLD AUTO: 3.5 M/UL (ref 4.1–5.7)
SODIUM SERPL-SCNC: 138 MMOL/L (ref 136–145)
WBC # BLD AUTO: 6.7 K/UL (ref 4.1–11.1)

## 2023-05-10 PROCEDURE — 93005 ELECTROCARDIOGRAM TRACING: CPT | Performed by: SURGERY

## 2023-05-10 PROCEDURE — 36415 COLL VENOUS BLD VENIPUNCTURE: CPT

## 2023-05-10 PROCEDURE — 84100 ASSAY OF PHOSPHORUS: CPT

## 2023-05-10 PROCEDURE — 83735 ASSAY OF MAGNESIUM: CPT

## 2023-05-10 PROCEDURE — 85027 COMPLETE CBC AUTOMATED: CPT

## 2023-05-10 PROCEDURE — 80048 BASIC METABOLIC PNL TOTAL CA: CPT

## 2023-05-10 RX ORDER — HYDROCHLOROTHIAZIDE 25 MG/1
12.5 TABLET ORAL DAILY
COMMUNITY

## 2023-05-10 RX ORDER — NIFEDIPINE 30 MG/1
30 TABLET, FILM COATED, EXTENDED RELEASE ORAL DAILY
COMMUNITY

## 2023-05-10 RX ORDER — LISINOPRIL 40 MG/1
40 TABLET ORAL DAILY
COMMUNITY

## 2023-05-10 RX ORDER — PANTOPRAZOLE SODIUM 40 MG/1
40 GRANULE, DELAYED RELEASE ORAL
COMMUNITY

## 2023-05-15 ENCOUNTER — ANESTHESIA (OUTPATIENT)
Facility: HOSPITAL | Age: 78
End: 2023-05-15
Payer: MEDICARE

## 2023-05-15 ENCOUNTER — HOSPITAL ENCOUNTER (OUTPATIENT)
Facility: HOSPITAL | Age: 78
Discharge: HOME | End: 2023-05-15
Attending: SURGERY | Admitting: SURGERY
Payer: MEDICARE

## 2023-05-15 ENCOUNTER — ANESTHESIA EVENT (OUTPATIENT)
Facility: HOSPITAL | Age: 78
End: 2023-05-15
Payer: MEDICARE

## 2023-05-15 VITALS
SYSTOLIC BLOOD PRESSURE: 134 MMHG | TEMPERATURE: 98 F | OXYGEN SATURATION: 98 % | HEART RATE: 101 BPM | DIASTOLIC BLOOD PRESSURE: 75 MMHG | RESPIRATION RATE: 18 BRPM

## 2023-05-15 DIAGNOSIS — Z98.890 S/P LEFT INGUINAL HERNIA REPAIR: ICD-10-CM

## 2023-05-15 DIAGNOSIS — K40.90 INGUINAL HERNIA, LEFT: Primary | ICD-10-CM

## 2023-05-15 DIAGNOSIS — Z87.19 S/P LEFT INGUINAL HERNIA REPAIR: ICD-10-CM

## 2023-05-15 LAB
CA-I BLD-MCNC: 1.19 MMOL/L (ref 1.12–1.32)
CHLORIDE BLD-SCNC: 106 MMOL/L (ref 98–107)
CREAT UR-MCNC: 1.21 MG/DL (ref 0.6–1.3)
GLUCOSE BLD STRIP.AUTO-MCNC: 97 MG/DL (ref 65–100)
POTASSIUM BLD-SCNC: 3.6 MMOL/L (ref 3.5–5.5)
SODIUM BLD-SCNC: 140 MMOL/L (ref 136–145)

## 2023-05-15 PROCEDURE — 3600000012 HC SURGERY LEVEL 2 ADDTL 15MIN: Performed by: SURGERY

## 2023-05-15 PROCEDURE — 80047 BASIC METABLC PNL IONIZED CA: CPT

## 2023-05-15 PROCEDURE — 7100000011 HC PHASE II RECOVERY - ADDTL 15 MIN: Performed by: SURGERY

## 2023-05-15 PROCEDURE — 2580000003 HC RX 258: Performed by: SURGERY

## 2023-05-15 PROCEDURE — 6360000002 HC RX W HCPCS: Performed by: NURSE ANESTHETIST, CERTIFIED REGISTERED

## 2023-05-15 PROCEDURE — 7100000010 HC PHASE II RECOVERY - FIRST 15 MIN: Performed by: SURGERY

## 2023-05-15 PROCEDURE — 3600000002 HC SURGERY LEVEL 2 BASE: Performed by: SURGERY

## 2023-05-15 PROCEDURE — C1781 MESH (IMPLANTABLE): HCPCS | Performed by: SURGERY

## 2023-05-15 PROCEDURE — 2580000003 HC RX 258: Performed by: ANESTHESIOLOGY

## 2023-05-15 PROCEDURE — 6360000002 HC RX W HCPCS: Performed by: SURGERY

## 2023-05-15 PROCEDURE — 2709999900 HC NON-CHARGEABLE SUPPLY: Performed by: SURGERY

## 2023-05-15 PROCEDURE — 2500000003 HC RX 250 WO HCPCS: Performed by: SURGERY

## 2023-05-15 PROCEDURE — 7100000001 HC PACU RECOVERY - ADDTL 15 MIN: Performed by: SURGERY

## 2023-05-15 PROCEDURE — 2500000003 HC RX 250 WO HCPCS: Performed by: NURSE ANESTHETIST, CERTIFIED REGISTERED

## 2023-05-15 PROCEDURE — 7100000000 HC PACU RECOVERY - FIRST 15 MIN: Performed by: SURGERY

## 2023-05-15 PROCEDURE — 3700000001 HC ADD 15 MINUTES (ANESTHESIA): Performed by: SURGERY

## 2023-05-15 PROCEDURE — 3700000000 HC ANESTHESIA ATTENDED CARE: Performed by: SURGERY

## 2023-05-15 DEVICE — MESH HERN W6XL6IN INGUINAL POLYPR MFIL SQ NONABSORBABLE: Type: IMPLANTABLE DEVICE | Site: INGUINAL | Status: FUNCTIONAL

## 2023-05-15 RX ORDER — SODIUM CHLORIDE 0.9 % (FLUSH) 0.9 %
5-40 SYRINGE (ML) INJECTION PRN
Status: DISCONTINUED | OUTPATIENT
Start: 2023-05-15 | End: 2023-05-15 | Stop reason: HOSPADM

## 2023-05-15 RX ORDER — LABETALOL HYDROCHLORIDE 5 MG/ML
10 INJECTION, SOLUTION INTRAVENOUS
Status: DISCONTINUED | OUTPATIENT
Start: 2023-05-15 | End: 2023-05-15 | Stop reason: HOSPADM

## 2023-05-15 RX ORDER — BUPIVACAINE HYDROCHLORIDE 2.5 MG/ML
INJECTION, SOLUTION EPIDURAL; INFILTRATION; INTRACAUDAL PRN
Status: DISCONTINUED | OUTPATIENT
Start: 2023-05-15 | End: 2023-05-15 | Stop reason: ALTCHOICE

## 2023-05-15 RX ORDER — OXYCODONE HYDROCHLORIDE 5 MG/1
5 TABLET ORAL EVERY 6 HOURS PRN
Qty: 15 TABLET | Refills: 0 | Status: SHIPPED | OUTPATIENT
Start: 2023-05-15 | End: 2023-05-20

## 2023-05-15 RX ORDER — SODIUM CHLORIDE, SODIUM LACTATE, POTASSIUM CHLORIDE, CALCIUM CHLORIDE 600; 310; 30; 20 MG/100ML; MG/100ML; MG/100ML; MG/100ML
INJECTION, SOLUTION INTRAVENOUS CONTINUOUS PRN
Status: DISCONTINUED | OUTPATIENT
Start: 2023-05-15 | End: 2023-05-15

## 2023-05-15 RX ORDER — PROPOFOL 10 MG/ML
INJECTION, EMULSION INTRAVENOUS PRN
Status: DISCONTINUED | OUTPATIENT
Start: 2023-05-15 | End: 2023-05-15 | Stop reason: SDUPTHER

## 2023-05-15 RX ORDER — LORAZEPAM 2 MG/ML
0.5 INJECTION INTRAMUSCULAR
Status: DISCONTINUED | OUTPATIENT
Start: 2023-05-15 | End: 2023-05-15 | Stop reason: HOSPADM

## 2023-05-15 RX ORDER — SODIUM CHLORIDE 9 MG/ML
INJECTION, SOLUTION INTRAVENOUS PRN
Status: DISCONTINUED | OUTPATIENT
Start: 2023-05-15 | End: 2023-05-15 | Stop reason: HOSPADM

## 2023-05-15 RX ORDER — DIPHENHYDRAMINE HYDROCHLORIDE 50 MG/ML
12.5 INJECTION INTRAMUSCULAR; INTRAVENOUS
Status: DISCONTINUED | OUTPATIENT
Start: 2023-05-15 | End: 2023-05-15 | Stop reason: HOSPADM

## 2023-05-15 RX ORDER — IPRATROPIUM BROMIDE AND ALBUTEROL SULFATE 2.5; .5 MG/3ML; MG/3ML
1 SOLUTION RESPIRATORY (INHALATION)
Status: DISCONTINUED | OUTPATIENT
Start: 2023-05-15 | End: 2023-05-15 | Stop reason: HOSPADM

## 2023-05-15 RX ORDER — LIDOCAINE HYDROCHLORIDE 20 MG/ML
INJECTION, SOLUTION EPIDURAL; INFILTRATION; INTRACAUDAL; PERINEURAL PRN
Status: DISCONTINUED | OUTPATIENT
Start: 2023-05-15 | End: 2023-05-15 | Stop reason: SDUPTHER

## 2023-05-15 RX ORDER — SODIUM CHLORIDE 0.9 % (FLUSH) 0.9 %
5-40 SYRINGE (ML) INJECTION EVERY 12 HOURS SCHEDULED
Status: DISCONTINUED | OUTPATIENT
Start: 2023-05-15 | End: 2023-05-15 | Stop reason: HOSPADM

## 2023-05-15 RX ORDER — METOCLOPRAMIDE HYDROCHLORIDE 5 MG/ML
10 INJECTION INTRAMUSCULAR; INTRAVENOUS
Status: DISCONTINUED | OUTPATIENT
Start: 2023-05-15 | End: 2023-05-15 | Stop reason: HOSPADM

## 2023-05-15 RX ORDER — ONDANSETRON 2 MG/ML
INJECTION INTRAMUSCULAR; INTRAVENOUS PRN
Status: DISCONTINUED | OUTPATIENT
Start: 2023-05-15 | End: 2023-05-15 | Stop reason: SDUPTHER

## 2023-05-15 RX ORDER — MEPERIDINE HYDROCHLORIDE 25 MG/ML
12.5 INJECTION INTRAMUSCULAR; INTRAVENOUS; SUBCUTANEOUS EVERY 5 MIN PRN
Status: DISCONTINUED | OUTPATIENT
Start: 2023-05-15 | End: 2023-05-15 | Stop reason: HOSPADM

## 2023-05-15 RX ORDER — ACETAMINOPHEN 325 MG/1
650 TABLET ORAL EVERY 6 HOURS PRN
Qty: 120 TABLET | Refills: 3 | Status: SHIPPED | OUTPATIENT
Start: 2023-05-15

## 2023-05-15 RX ORDER — OXYCODONE HYDROCHLORIDE 5 MG/1
5 TABLET ORAL PRN
Status: DISCONTINUED | OUTPATIENT
Start: 2023-05-15 | End: 2023-05-15 | Stop reason: HOSPADM

## 2023-05-15 RX ORDER — OXYCODONE HYDROCHLORIDE 5 MG/1
5 TABLET ORAL EVERY 6 HOURS PRN
Qty: 15 TABLET | Refills: 0 | Status: SHIPPED | OUTPATIENT
Start: 2023-05-15 | End: 2023-05-15 | Stop reason: SDUPTHER

## 2023-05-15 RX ORDER — SODIUM CHLORIDE 450 MG/100ML
INJECTION, SOLUTION INTRAVENOUS CONTINUOUS
Status: DISCONTINUED | OUTPATIENT
Start: 2023-05-15 | End: 2023-05-15 | Stop reason: HOSPADM

## 2023-05-15 RX ORDER — IBUPROFEN 200 MG
400 TABLET ORAL EVERY 8 HOURS PRN
Qty: 120 TABLET | Refills: 3 | Status: SHIPPED | OUTPATIENT
Start: 2023-05-15 | End: 2023-05-19

## 2023-05-15 RX ORDER — SODIUM CHLORIDE, SODIUM LACTATE, POTASSIUM CHLORIDE, CALCIUM CHLORIDE 600; 310; 30; 20 MG/100ML; MG/100ML; MG/100ML; MG/100ML
INJECTION, SOLUTION INTRAVENOUS CONTINUOUS
Status: DISCONTINUED | OUTPATIENT
Start: 2023-05-15 | End: 2023-05-15 | Stop reason: HOSPADM

## 2023-05-15 RX ORDER — HYDRALAZINE HYDROCHLORIDE 20 MG/ML
10 INJECTION INTRAMUSCULAR; INTRAVENOUS
Status: DISCONTINUED | OUTPATIENT
Start: 2023-05-15 | End: 2023-05-15 | Stop reason: HOSPADM

## 2023-05-15 RX ORDER — HYDROMORPHONE HYDROCHLORIDE 1 MG/ML
0.5 INJECTION, SOLUTION INTRAMUSCULAR; INTRAVENOUS; SUBCUTANEOUS EVERY 5 MIN PRN
Status: DISCONTINUED | OUTPATIENT
Start: 2023-05-15 | End: 2023-05-15 | Stop reason: HOSPADM

## 2023-05-15 RX ORDER — FENTANYL CITRATE 50 UG/ML
50 INJECTION, SOLUTION INTRAMUSCULAR; INTRAVENOUS EVERY 5 MIN PRN
Status: DISCONTINUED | OUTPATIENT
Start: 2023-05-15 | End: 2023-05-15 | Stop reason: HOSPADM

## 2023-05-15 RX ORDER — OXYCODONE HYDROCHLORIDE 5 MG/1
10 TABLET ORAL PRN
Status: DISCONTINUED | OUTPATIENT
Start: 2023-05-15 | End: 2023-05-15 | Stop reason: HOSPADM

## 2023-05-15 RX ORDER — EPHEDRINE SULFATE 50 MG/ML
INJECTION INTRAVENOUS PRN
Status: DISCONTINUED | OUTPATIENT
Start: 2023-05-15 | End: 2023-05-15 | Stop reason: SDUPTHER

## 2023-05-15 RX ORDER — ONDANSETRON 2 MG/ML
4 INJECTION INTRAMUSCULAR; INTRAVENOUS
Status: DISCONTINUED | OUTPATIENT
Start: 2023-05-15 | End: 2023-05-15 | Stop reason: HOSPADM

## 2023-05-15 RX ORDER — DEXAMETHASONE SODIUM PHOSPHATE 4 MG/ML
INJECTION, SOLUTION INTRA-ARTICULAR; INTRALESIONAL; INTRAMUSCULAR; INTRAVENOUS; SOFT TISSUE PRN
Status: DISCONTINUED | OUTPATIENT
Start: 2023-05-15 | End: 2023-05-15 | Stop reason: SDUPTHER

## 2023-05-15 RX ORDER — FENTANYL CITRATE 50 UG/ML
INJECTION, SOLUTION INTRAMUSCULAR; INTRAVENOUS PRN
Status: DISCONTINUED | OUTPATIENT
Start: 2023-05-15 | End: 2023-05-15 | Stop reason: SDUPTHER

## 2023-05-15 RX ORDER — ROCURONIUM BROMIDE 10 MG/ML
INJECTION, SOLUTION INTRAVENOUS PRN
Status: DISCONTINUED | OUTPATIENT
Start: 2023-05-15 | End: 2023-05-15 | Stop reason: SDUPTHER

## 2023-05-15 RX ADMIN — FENTANYL CITRATE 25 MCG: 50 INJECTION, SOLUTION INTRAMUSCULAR; INTRAVENOUS at 08:02

## 2023-05-15 RX ADMIN — EPHEDRINE SULFATE 10 MG: 50 INJECTION INTRAVENOUS at 08:40

## 2023-05-15 RX ADMIN — ROCURONIUM BROMIDE 50 MG: 10 INJECTION INTRAVENOUS at 07:43

## 2023-05-15 RX ADMIN — EPHEDRINE SULFATE 10 MG: 50 INJECTION INTRAVENOUS at 08:48

## 2023-05-15 RX ADMIN — FENTANYL CITRATE 50 MCG: 50 INJECTION, SOLUTION INTRAMUSCULAR; INTRAVENOUS at 07:42

## 2023-05-15 RX ADMIN — ONDANSETRON 4 MG: 2 INJECTION INTRAMUSCULAR; INTRAVENOUS at 07:52

## 2023-05-15 RX ADMIN — EPHEDRINE SULFATE 10 MG: 50 INJECTION INTRAVENOUS at 08:18

## 2023-05-15 RX ADMIN — SUGAMMADEX 200 MG: 100 INJECTION, SOLUTION INTRAVENOUS at 09:07

## 2023-05-15 RX ADMIN — EPHEDRINE SULFATE 5 MG: 50 INJECTION INTRAVENOUS at 08:37

## 2023-05-15 RX ADMIN — SODIUM CHLORIDE, POTASSIUM CHLORIDE, SODIUM LACTATE AND CALCIUM CHLORIDE: 600; 310; 30; 20 INJECTION, SOLUTION INTRAVENOUS at 06:47

## 2023-05-15 RX ADMIN — DEXAMETHASONE SODIUM PHOSPHATE 4 MG: 4 INJECTION, SOLUTION INTRA-ARTICULAR; INTRALESIONAL; INTRAMUSCULAR; INTRAVENOUS; SOFT TISSUE at 07:52

## 2023-05-15 RX ADMIN — EPHEDRINE SULFATE 5 MG: 50 INJECTION INTRAVENOUS at 08:45

## 2023-05-15 RX ADMIN — FENTANYL CITRATE 25 MCG: 50 INJECTION, SOLUTION INTRAMUSCULAR; INTRAVENOUS at 08:13

## 2023-05-15 RX ADMIN — CEFAZOLIN SODIUM 2000 MG: 1 INJECTION, POWDER, FOR SOLUTION INTRAMUSCULAR; INTRAVENOUS at 07:41

## 2023-05-15 RX ADMIN — EPHEDRINE SULFATE 10 MG: 50 INJECTION INTRAVENOUS at 07:55

## 2023-05-15 RX ADMIN — PROPOFOL 150 MG: 10 INJECTION, EMULSION INTRAVENOUS at 07:42

## 2023-05-15 RX ADMIN — LIDOCAINE HYDROCHLORIDE 100 MG: 20 INJECTION, SOLUTION EPIDURAL; INFILTRATION; INTRACAUDAL; PERINEURAL at 07:42

## 2023-05-15 ASSESSMENT — PAIN SCALES - GENERAL
PAINLEVEL_OUTOF10: 0

## 2023-05-15 ASSESSMENT — PAIN - FUNCTIONAL ASSESSMENT: PAIN_FUNCTIONAL_ASSESSMENT: 0-10

## 2023-05-15 NOTE — PROGRESS NOTES
[facility] in stable condition. Bedside report given, SBAR reviewed, sites viewed. Family and belongings to bedside.

## 2023-05-15 NOTE — OP NOTE
Operative Note      Patient: Obie Marte  YOB: 1945  MRN: [id number]    Date of Procedure: 5/15/2023    Pre-Op Diagnosis Codes:     * Unilateral inguinal hernia without obstruction or gangrene, recurrence not specified [K40.90]  Left Inguinal Hernia    Post-Op Diagnosis: Same       Procedure(s):  OPEN LEFT INGUINAL HERNIA REPAIR with MESH    Surgeon(s):  Ximena Diez MD    Assistant:   Surgical Assistant: Odie Schwab    Anesthesia: General    Estimated Blood Loss (mL): Minimal    Complications: None    Specimens:   * No specimens in log *    Implants:  Implant Name Type Inv. Item Serial No.  Lot No. LRB No. Used Action   MESH JUANI [id number] INGUINAL POLYPR MFIL SQ NONABSORBABLE - SN/A  MESH JUANI [id number] INGUINAL POLYPR MFIL SQ NONABSORBABLE N/A BARD DAVOL-WD [id number] Left 1 Implanted         Drains: * No LDAs found *    Findings: large direct inguinal hernia      Detailed Description of Procedure: The patient was taken to the operating room. A timeout was completed verifying correct patient, procedure, site, positioning, and all special equipment prior to beginning this procedure. General anesthesia was induced. The left groin was prepped and draped in the usual sterile fashion. A field block was produced by raising skin wheals along the proposed skin incision, along a vertical line lateral to it, and along along a horizontal line superior to it. A skin wheal was raised 2 cm lateral  and superior to anterior superior illiac spine and a fascial injection of marcaine was made to block the illioinguinal nerve. An incision was made starting at the pubic tubercle going up towards the anterior superior iliac spine. The skin incision was made with a knife and deepened through Shima's and camper's fascia with electrocautery until the aponeurosis of the external oblique was encountered. This was opened and the external ring was exposed.   Hemostasis was achieved in the

## 2023-05-15 NOTE — H&P
Chief Complaint   Patient presents with    New Patient    Hernia (Non Specific)       Pain comes and go, per patient    Swelling and pain in left groin        History of present illness     This is a 68year old male who presents with Swelling in left groin for approximately 6 month to 1 year. He states its associated with Intermittent  pain. No pain present today. He says it reduces on its own. No history of similar symptoms in the past. No known alleviating or aggravating factors when discomfort/pain presents. Usually only last a few minutes and resolves on its own without any intervention. He denies nausea, vomiting, diarrhea, constipation, chest pain or shortness of breath, brbpr, melena. No dysuria, urgency, frequency. No fever or chills. No straining to urinatre  No hx of MI. Can walk >1 mile without chest pain or shortness of breath.                    Past Medical History:   Diagnosis Date    BPH (benign prostatic hyperplasia) 06/01/2021    Colon polyps 2007     RECTAL POLYP    Cyst of kidney, acquired       R KIDNEY 11-6-2010    Dysphagia 06/01/2021    Flatulence 06/01/2021    GERD (gastroesophageal reflux disease)      HTN (hypertension) 06/01/2021    Left adrenal mass ([facility] [address] 09/27/2004     ADENOMA  11-6-06    Reflux esophagitis 06/01/2021    Stricture of esophagus 06/01/2021               Past Surgical History:   Procedure Laterality Date    APPENDECTOMY,W OTHR C        COLONOSCOPY   10/01/2007     RECTAL, POLYP    COLONOSCOPY N/A 6/17/2021     COLONOSCOPY AND EGD (T I V A) performed by Lawson Wiseman MD at [facility] ENDOSCOPY    ENDOSCOPY VISIT-OUTPATIENT   07/10/2015    ENDOSCOPY VISIT-OUTPATIENT   10/01/2007     EGD, ED , COLONOSCOPY RECTAL POLYP    HX CATARACT REMOVAL Bilateral       9-, 10-25-05               Family History   Problem Relation Age of Onset    Heart Disease Mother      Hypertension Other      No Known Problems Father           Social History            Tobacco Use    Smoking

## 2023-05-15 NOTE — PROGRESS NOTES
Discharge instructions/handout given to patient and spouse. Verbally understood. Patient stable. No distress or c/o pain/discomfort at surgical site. Patient to be discharged home with spouse in stable condition.

## 2023-05-15 NOTE — ANESTHESIA PRE PROCEDURE
(!) 155/69   05/10/23 (!) 143/62   04/24/23 (!) 150/51       NPO Status: Time of last liquid consumption: 0400                        Time of last solid consumption: 1700                        Date of last liquid consumption: 05/15/23 (SIP WATER WITH MEDS )                        Date of last solid food consumption: 05/14/23    BMI:   Wt Readings from Last 3 Encounters:   05/10/23 78.2 kg (172 lb 6.4 oz)   04/24/23 78.2 kg (172 lb 6.4 oz)   12/06/22 81.1 kg (178 lb 12.8 oz)     There is no height or weight on file to calculate BMI.    CBC:   Lab Results   Component Value Date/Time    WBC 6.7 05/10/2023 02:00 PM    RBC 3.50 05/10/2023 02:00 PM    HGB 11.0 05/10/2023 02:00 PM    HCT 34.6 05/10/2023 02:00 PM    MCV 98.9 05/10/2023 02:00 PM    RDW 12.1 05/10/2023 02:00 PM     05/10/2023 02:00 PM       CMP:   Lab Results   Component Value Date/Time     05/10/2023 02:00 PM    K 4.7 05/10/2023 02:00 PM     05/10/2023 02:00 PM    CO2 27 05/10/2023 02:00 PM    BUN 24 05/10/2023 02:00 PM    CREATININE 1.21 05/15/2023 06:45 AM    CREATININE 1.23 05/10/2023 02:00 PM    GFRAA 65 11/04/2021 09:25 AM    AGRATIO 1.6 11/04/2021 09:25 AM    LABGLOM >60 05/10/2023 02:00 PM    GLUCOSE 94 05/10/2023 02:00 PM    PROT 7.4 11/04/2021 09:25 AM    CALCIUM 8.8 05/10/2023 02:00 PM    BILITOT 0.6 11/04/2021 09:25 AM    ALKPHOS 64 11/04/2021 09:25 AM    AST 13 11/04/2021 09:25 AM    ALT 11 11/04/2021 09:25 AM       POC Tests:   Recent Labs     05/15/23  0645   POCGLU 97   POCNA 140   POCK 3.6   POCCL 106       Coags: No results found for: PROTIME, INR, APTT    HCG (If Applicable): No results found for: PREGTESTUR, PREGSERUM, HCG, HCGQUANT     ABGs: No results found for: PHART, PO2ART, XPC3DSF, LVL1KIY, BEART, Y7DCRDXG     Type & Screen (If Applicable):  No results found for: LABABO, LABRH    Drug/Infectious Status (If Applicable):  No results found for: HIV, HEPCAB    COVID-19 Screening (If Applicable): No results found for:

## 2023-05-15 NOTE — ANESTHESIA POSTPROCEDURE EVALUATION
Department of Anesthesiology  Postprocedure Note    Patient: Boy Lee  MRN: [id number]  YOB: 1945  Date of evaluation: 5/15/2023      Procedure Summary     Date: 05/15/23 Room / Location: [facility] MAIN OR 06 / [facility] MAIN OR    Anesthesia Start: 0728 Anesthesia Stop: 9275    Procedure: OPEN LEFT INGUINAL HERNIA REPAIR (Left: Groin) Diagnosis:       Unilateral inguinal hernia without obstruction or gangrene, recurrence not specified      (Unilateral inguinal hernia without obstruction or gangrene, recurrence not specified [K40.90])    Surgeons: Debra Baum MD Responsible Provider: Jovany Cenetno MD    Anesthesia Type: General ASA Status: 2          Anesthesia Type: General    Ana Phase I: Ana Score: 10    Ana Phase II:        Anesthesia Post Evaluation    Patient location during evaluation: PACU  Patient participation: complete - patient cannot participate  Level of consciousness: awake  Pain score: 0  Airway patency: patent  Nausea & Vomiting: no nausea and no vomiting  Complications: no  Cardiovascular status: hemodynamically stable  Respiratory status: acceptable  Hydration status: stable  Multimodal analgesia pain management approach

## 2023-05-17 ENCOUNTER — OFFICE VISIT (OUTPATIENT)
Age: 78
End: 2023-05-17

## 2023-05-17 ENCOUNTER — TELEPHONE (OUTPATIENT)
Age: 78
End: 2023-05-17

## 2023-05-17 VITALS
DIASTOLIC BLOOD PRESSURE: 56 MMHG | RESPIRATION RATE: 15 BRPM | OXYGEN SATURATION: 96 % | HEIGHT: 71 IN | HEART RATE: 73 BPM | BODY MASS INDEX: 24.22 KG/M2 | SYSTOLIC BLOOD PRESSURE: 146 MMHG | WEIGHT: 173 LBS | TEMPERATURE: 98.2 F

## 2023-05-17 DIAGNOSIS — Z98.890 S/P INGUINAL HERNIA REPAIR: Primary | ICD-10-CM

## 2023-05-17 DIAGNOSIS — Z87.19 S/P INGUINAL HERNIA REPAIR: Primary | ICD-10-CM

## 2023-05-17 RX ORDER — GREEN TEA/HOODIA GORDONII 315-12.5MG
1 CAPSULE ORAL 2 TIMES DAILY
Qty: 60 TABLET | Refills: 0 | Status: SHIPPED | OUTPATIENT
Start: 2023-05-17 | End: 2023-05-24

## 2023-05-17 RX ORDER — AMOXICILLIN AND CLAVULANATE POTASSIUM 875; 125 MG/1; MG/1
1 TABLET, FILM COATED ORAL 2 TIMES DAILY
Qty: 14 TABLET | Refills: 0 | Status: SHIPPED | OUTPATIENT
Start: 2023-05-17 | End: 2023-05-24

## 2023-05-17 NOTE — TELEPHONE ENCOUNTER
Sheri Ang states that he is having a lot of swelling from his surgery. He wants to know if this is normal and how long he should be experiencing these symptoms. Please call when you are available.  Phone: [phone]

## 2023-05-17 NOTE — PROGRESS NOTES
Identified pt with two pt identifiers (name and ). Reviewed chart in preparation for visit and have obtained necessary documentation. Yara Harrington is a 68 y.o. male  Chief Complaint   Patient presents with    Post-Op Check     Unilateral inguinal hernia     BP (!) 146/56 (Site: Left Upper Arm, Position: Sitting, Cuff Size: Medium Adult)   Pulse 73   Temp 98.2 °F (36.8 °C) (Oral)   Resp 15   Ht 5' 11\" (1.803 m)   Wt 173 lb (78.5 kg)   SpO2 96%   BMI 24.13 kg/m²     1. Have you been to the ER, urgent care clinic since your last visit? Hospitalized since your last visit?no    2. Have you seen or consulted any other health care providers outside of the [address] [place] [address] since your last visit? Include any pap smears or colon screening. No    Patient and provider made aware of elevated BP x2. Patient asymptomatic. Patient reminded to monitor BP, continue to take BP medications if prescribed, and follow up with PCP/Cardiologist.  Patient expressed understanding and agreement.

## 2023-05-18 NOTE — PROGRESS NOTES
General Surgery Progress Note    Subjective:    Patient s/p open left inguinal hernia repair POD#2. Called because he noticed some mild swelling in left groin. Denies any abdominal pain, pain in groin, nausea or vomiting. He is tolerating regular diet and voiding without difficult. He denies fever or chills or redness at incision site or drainage from incision site. Says he only taken ibuprofen twice since his surgery and has not required any narcotics. Has not  taken any pain medication today. Denies testicular pain    Objective:  Vitals:    05/17/23 1448   BP: (!) 146/56   Pulse: 73   Resp: 15   Temp: 98.2 °F (36.8 °C)   SpO2: 96%        General: awake and alert in NAD  Abdomen: soft non tender non distended  Left groin: incision site no erythema no drainage. Mild swelling in left groin around incision. No signs of recurrence. No cellulitis no drainage. Non tender. Testicle non tender no erythema. Overlying skin soft not tense. A/P:  S/p left inguinal hernia repair with mesh  Small seroma vs normal post op swelling. No signs of infection or recurrence    Gave patient 1 week of Augmentin for prophylaxis. Though no signs of infection.   Encouraged to take probiotics while on abx  Return to see me in 2 weeks or PRN

## 2023-05-24 RX ORDER — PANTOPRAZOLE SODIUM 40 MG/1
40 TABLET, DELAYED RELEASE ORAL DAILY
COMMUNITY
Start: 2021-05-03

## 2023-05-24 RX ORDER — LISINOPRIL 40 MG/1
40 TABLET ORAL DAILY
COMMUNITY
Start: 2021-05-03

## 2023-05-24 RX ORDER — NIFEDIPINE 30 MG/1
30 TABLET, EXTENDED RELEASE ORAL DAILY
COMMUNITY
Start: 2021-05-03

## 2023-05-24 RX ORDER — HYDROCHLOROTHIAZIDE 12.5 MG/1
12.5 CAPSULE, GELATIN COATED ORAL DAILY
COMMUNITY
Start: 2021-05-03

## 2023-05-31 ENCOUNTER — OFFICE VISIT (OUTPATIENT)
Age: 78
End: 2023-05-31

## 2023-05-31 VITALS
BODY MASS INDEX: 24.75 KG/M2 | OXYGEN SATURATION: 96 % | WEIGHT: 176.8 LBS | HEIGHT: 71 IN | RESPIRATION RATE: 19 BRPM | TEMPERATURE: 98.9 F | SYSTOLIC BLOOD PRESSURE: 134 MMHG | DIASTOLIC BLOOD PRESSURE: 64 MMHG | HEART RATE: 70 BPM

## 2023-05-31 DIAGNOSIS — Z87.19 S/P LEFT INGUINAL HERNIA REPAIR: Primary | ICD-10-CM

## 2023-05-31 DIAGNOSIS — Z98.890 S/P LEFT INGUINAL HERNIA REPAIR: Primary | ICD-10-CM

## 2023-05-31 ASSESSMENT — PATIENT HEALTH QUESTIONNAIRE - PHQ9
SUM OF ALL RESPONSES TO PHQ QUESTIONS 1-9: 0
SUM OF ALL RESPONSES TO PHQ QUESTIONS 1-9: 0
2. FEELING DOWN, DEPRESSED OR HOPELESS: 0
1. LITTLE INTEREST OR PLEASURE IN DOING THINGS: 0
SUM OF ALL RESPONSES TO PHQ QUESTIONS 1-9: 0
DEPRESSION UNABLE TO ASSESS: PT REFUSES
SUM OF ALL RESPONSES TO PHQ9 QUESTIONS 1 & 2: 0
SUM OF ALL RESPONSES TO PHQ QUESTIONS 1-9: 0

## 2023-05-31 NOTE — PROGRESS NOTES
Identified pt with two pt identifiers (name and ). Reviewed chart in preparation for visit and have obtained necessary documentation. Reggie Hernandez is a 68 y.o. male  Chief Complaint   Patient presents with    Follow-up     Hernia repair     /64 (Site: Left Upper Arm, Position: Sitting, Cuff Size: Small Adult)   Pulse 70   Temp 98.9 °F (37.2 °C) (Oral)   Resp 19   Ht 5' 11\" (1.803 m)   Wt 176 lb 12.8 oz (80.2 kg)   SpO2 96%   BMI 24.66 kg/m²     1. Have you been to the ER, urgent care clinic since your last visit? Hospitalized since your last visit? NO    2. Have you seen or consulted any other health care providers outside of the [address] [place] [address] since your last visit? Include any pap smears or colon screening.  NO

## 2023-06-01 NOTE — PROGRESS NOTES
[facility]      Clinic Note - Follow up    [address] returns for scheduled follow up today. He is s/p left inguinal hernia repair with mesh 05/15/2023. He is doing well. He does not have any pain in his groin. He denies fever of chills. He denies drainage from the wound. Objective     /64 (Site: Left Upper Arm, Position: Sitting, Cuff Size: Small Adult)   Pulse 70   Temp 98.9 °F (37.2 °C) (Oral)   Resp 19   Ht 5' 11\" (1.803 m)   Wt 176 lb 12.8 oz (80.2 kg)   SpO2 96%   BMI 24.66 kg/m²       PE  GEN - Awake, alert, communicating appropriately. NAD  Pulm - NWAB  CV - RRR  Abd - soft, NT, ND. Left groin- wound healed, no erythema no drainage. No evidence of recurrence. Some swelling along incision consistent with post surgical changes      Assessment     Brigitte Ballard is a 68 y. o.yr old male s/p open left inguinal hernia repair with mesh    Plan     Doing well. Follow up in 2 months to ensure post surgical swelling resolves    15 mins of time was spent with the patient including reviewing chart, history and physical examination, and discussing treatment plan with patient and their family.       Yandy Hoff MD  6/1/2023

## 2023-08-02 ENCOUNTER — OFFICE VISIT (OUTPATIENT)
Age: 78
End: 2023-08-02

## 2023-08-02 VITALS
RESPIRATION RATE: 16 BRPM | WEIGHT: 172 LBS | HEIGHT: 72 IN | BODY MASS INDEX: 23.3 KG/M2 | DIASTOLIC BLOOD PRESSURE: 64 MMHG | TEMPERATURE: 98 F | OXYGEN SATURATION: 97 % | HEART RATE: 66 BPM | SYSTOLIC BLOOD PRESSURE: 138 MMHG

## 2023-08-02 DIAGNOSIS — Z98.890 S/P LEFT INGUINAL HERNIA REPAIR: ICD-10-CM

## 2023-08-02 DIAGNOSIS — Z87.19 S/P LEFT INGUINAL HERNIA REPAIR: ICD-10-CM

## 2023-08-02 PROCEDURE — 99024 POSTOP FOLLOW-UP VISIT: CPT | Performed by: SURGERY

## 2023-08-07 PROBLEM — Z87.19 S/P LEFT INGUINAL HERNIA REPAIR: Status: ACTIVE | Noted: 2023-08-07

## 2023-08-07 PROBLEM — Z98.890 S/P LEFT INGUINAL HERNIA REPAIR: Status: ACTIVE | Noted: 2023-08-07

## 2024-04-03 ENCOUNTER — PREP FOR PROCEDURE (OUTPATIENT)
Age: 79
End: 2024-04-03

## 2024-04-03 ENCOUNTER — OFFICE VISIT (OUTPATIENT)
Age: 79
End: 2024-04-03
Payer: MEDICARE

## 2024-04-03 VITALS
HEART RATE: 73 BPM | OXYGEN SATURATION: 96 % | RESPIRATION RATE: 14 BRPM | TEMPERATURE: 98.2 F | HEIGHT: 72 IN | WEIGHT: 175.6 LBS | SYSTOLIC BLOOD PRESSURE: 120 MMHG | BODY MASS INDEX: 23.78 KG/M2 | DIASTOLIC BLOOD PRESSURE: 60 MMHG

## 2024-04-03 DIAGNOSIS — K21.00 GASTROESOPHAGEAL REFLUX DISEASE WITH ESOPHAGITIS WITHOUT HEMORRHAGE: ICD-10-CM

## 2024-04-03 DIAGNOSIS — R13.19 ESOPHAGEAL DYSPHAGIA: ICD-10-CM

## 2024-04-03 DIAGNOSIS — R13.19 ESOPHAGEAL DYSPHAGIA: Primary | ICD-10-CM

## 2024-04-03 DIAGNOSIS — K22.2 STRICTURE OF ESOPHAGUS: ICD-10-CM

## 2024-04-03 PROCEDURE — G8427 DOCREV CUR MEDS BY ELIG CLIN: HCPCS | Performed by: INTERNAL MEDICINE

## 2024-04-03 PROCEDURE — 3074F SYST BP LT 130 MM HG: CPT | Performed by: INTERNAL MEDICINE

## 2024-04-03 PROCEDURE — 99214 OFFICE O/P EST MOD 30 MIN: CPT | Performed by: INTERNAL MEDICINE

## 2024-04-03 PROCEDURE — 1036F TOBACCO NON-USER: CPT | Performed by: INTERNAL MEDICINE

## 2024-04-03 PROCEDURE — 1123F ACP DISCUSS/DSCN MKR DOCD: CPT | Performed by: INTERNAL MEDICINE

## 2024-04-03 PROCEDURE — 43235 EGD DIAGNOSTIC BRUSH WASH: CPT | Performed by: INTERNAL MEDICINE

## 2024-04-03 PROCEDURE — G8420 CALC BMI NORM PARAMETERS: HCPCS | Performed by: INTERNAL MEDICINE

## 2024-04-03 PROCEDURE — 3078F DIAST BP <80 MM HG: CPT | Performed by: INTERNAL MEDICINE

## 2024-04-03 ASSESSMENT — PATIENT HEALTH QUESTIONNAIRE - PHQ9
SUM OF ALL RESPONSES TO PHQ QUESTIONS 1-9: 0
1. LITTLE INTEREST OR PLEASURE IN DOING THINGS: NOT AT ALL
SUM OF ALL RESPONSES TO PHQ9 QUESTIONS 1 & 2: 0
SUM OF ALL RESPONSES TO PHQ QUESTIONS 1-9: 0
2. FEELING DOWN, DEPRESSED OR HOPELESS: NOT AT ALL

## 2024-04-03 NOTE — PROGRESS NOTES
Chief Complaint   Patient presents with    Dysphagia     Established patient     /60 (Site: Left Upper Arm, Position: Sitting, Cuff Size: Large Adult)   Pulse 73   Temp 98.2 °F (36.8 °C) (Temporal)   Resp 14   Ht 1.829 m (6')   Wt 79.7 kg (175 lb 9.6 oz)   SpO2 96%   BMI 23.82 kg/m²    1. Have you been to the ER, urgent care clinic since your last visit?  Hospitalized since your last visit?no    2. Have you seen or consulted any other health care providers outside of the [facility] since your last visit?  Include any pap smears or colon screening. no

## 2024-04-07 NOTE — H&P (VIEW-ONLY)
Lei Jaimes is a 78 y.o. male who presents today for the following:  Chief Complaint   Patient presents with    Dysphagia     Established patient, recently problem swallowing goes and comes         No Known Allergies    Current Outpatient Medications   Medication Sig Dispense Refill    NIFEdipine (ADALAT CC) 60 MG extended release tablet       lisinopril (PRINIVIL;ZESTRIL) 40 MG tablet Take 1 tablet by mouth daily      pantoprazole (PROTONIX) 40 MG tablet Take 1 tablet by mouth daily      hydroCHLOROthiazide (HYDRODIURIL) 25 MG tablet Take 0.5 tablets by mouth daily       No current facility-administered medications for this visit.       Past Medical History:   Diagnosis Date    BPH (benign prostatic hyperplasia) 06/01/2021    Colon polyps 2007    RECTAL POLYP    Cyst of kidney, acquired     pt denies - states had kidney stones    Dysphagia 06/01/2021    Dysphagia     Enlarged prostate     Flatulence 06/01/2021    GERD (gastroesophageal reflux disease)     GERD (gastroesophageal reflux disease)     HTN (hypertension) 06/01/2021    Inguinal hernia     left side x 6 months    Kidney stones     Left adrenal mass (HCC) 09/27/2004    ADENOMA  11-6-06    Reflux esophagitis 06/01/2021    Stricture of esophagus 06/01/2021       Past Surgical History:   Procedure Laterality Date    APPENDECTOMY,W OTHR C      CATARACT REMOVAL Bilateral     9-, 10-25-05 with lens implants    COLONOSCOPY N/A 6/17/2021    COLONOSCOPY AND EGD (T I V A) performed by Autumn Soto MD at [facility] ENDOSCOPY    COLONOSCOPY  10/01/2007    RECTAL, POLYP    EGD      ENDOSCOPY VISIT OUTPATIENT  10/01/2007    EGD, ED , COLONOSCOPY RECTAL POLYP    ENDOSCOPY VISIT OUTPATIENT  07/10/2015    pt states every 2-3 years needs dilated    ESOPHAGOGASTRODUODENOSCOPY  06/17/2021    ESOPHAGOGASTRODUODENOSCOPY  12/13/2022    dysphagia    HERNIA REPAIR Left 05/15/2023    OPEN LEFT INGUINAL HERNIA REPAIR performed by Crista Arora MD at [facility] MAIN OR    Gastrointestinal:  Negative for abdominal distention, abdominal pain, anal bleeding, blood in stool, constipation, diarrhea, nausea, rectal pain and vomiting.   Genitourinary: Negative.    Musculoskeletal:  Positive for arthralgias and back pain.   Allergic/Immunologic: Negative.    Neurological: Negative.    Hematological: Negative.    Psychiatric/Behavioral: Negative.     All other systems reviewed and are negative.          /60 (Site: Left Upper Arm, Position: Sitting, Cuff Size: Large Adult)   Pulse 73   Temp 98.2 °F (36.8 °C) (Temporal)   Resp 14   Ht 1.829 m (6')   Wt 79.7 kg (175 lb 9.6 oz)   SpO2 96%   BMI 23.82 kg/m²     Physical Exam  Vitals and nursing note reviewed.   Constitutional:       Appearance: Normal appearance. He is normal weight.   HENT:      Head: Normocephalic and atraumatic.   Eyes:      General: No scleral icterus.  Cardiovascular:      Rate and Rhythm: Normal rate and regular rhythm.      Pulses: Normal pulses.      Heart sounds: Normal heart sounds.   Pulmonary:      Effort: Pulmonary effort is normal.      Breath sounds: Normal breath sounds.   Abdominal:      General: There is no distension.      Palpations: There is no mass.      Tenderness: There is no abdominal tenderness. There is no right CVA tenderness, left CVA tenderness, guarding or rebound.      Hernia: No hernia is present.   Musculoskeletal:      Right lower leg: No edema.      Left lower leg: No edema.   Skin:     Coloration: Skin is not jaundiced.   Neurological:      General: No focal deficit present.      Mental Status: He is alert and oriented to person, place, and time. Mental status is at baseline.   Psychiatric:         Mood and Affect: Mood normal.         Behavior: Behavior normal.         Thought Content: Thought content normal.         Judgment: Judgment normal.        1. Esophageal dysphagia  Will schedule patient for an EGD with esophageal dilatation.  - VT ESOPHAGOGASTRODUODENOSCOPY TRANSORAL

## 2024-04-16 ENCOUNTER — ANESTHESIA (OUTPATIENT)
Facility: HOSPITAL | Age: 79
End: 2024-04-16
Payer: MEDICARE

## 2024-04-16 ENCOUNTER — ANESTHESIA EVENT (OUTPATIENT)
Facility: HOSPITAL | Age: 79
End: 2024-04-16
Payer: MEDICARE

## 2024-04-16 ENCOUNTER — HOSPITAL ENCOUNTER (OUTPATIENT)
Facility: HOSPITAL | Age: 79
Setting detail: OUTPATIENT SURGERY
Discharge: HOME | End: 2024-04-16
Attending: INTERNAL MEDICINE | Admitting: INTERNAL MEDICINE
Payer: MEDICARE

## 2024-04-16 VITALS
OXYGEN SATURATION: 98 % | HEART RATE: 89 BPM | HEIGHT: 71 IN | SYSTOLIC BLOOD PRESSURE: 143 MMHG | DIASTOLIC BLOOD PRESSURE: 66 MMHG | TEMPERATURE: 98 F | BODY MASS INDEX: 23.79 KG/M2 | RESPIRATION RATE: 20 BRPM | WEIGHT: 169.9 LBS

## 2024-04-16 DIAGNOSIS — K21.00 GASTROESOPHAGEAL REFLUX DISEASE WITH ESOPHAGITIS WITHOUT HEMORRHAGE: Primary | ICD-10-CM

## 2024-04-16 PROCEDURE — C1726 CATH, BAL DIL, NON-VASCULAR: HCPCS | Performed by: INTERNAL MEDICINE

## 2024-04-16 PROCEDURE — 2580000003 HC RX 258: Performed by: INTERNAL MEDICINE

## 2024-04-16 PROCEDURE — 3700000000 HC ANESTHESIA ATTENDED CARE: Performed by: INTERNAL MEDICINE

## 2024-04-16 PROCEDURE — 6370000000 HC RX 637 (ALT 250 FOR IP): Performed by: NURSE ANESTHETIST, CERTIFIED REGISTERED

## 2024-04-16 PROCEDURE — 7100000010 HC PHASE II RECOVERY - FIRST 15 MIN: Performed by: INTERNAL MEDICINE

## 2024-04-16 PROCEDURE — 6360000002 HC RX W HCPCS: Performed by: NURSE ANESTHETIST, CERTIFIED REGISTERED

## 2024-04-16 PROCEDURE — 3600007502: Performed by: INTERNAL MEDICINE

## 2024-04-16 PROCEDURE — 3600007512: Performed by: INTERNAL MEDICINE

## 2024-04-16 PROCEDURE — 3700000001 HC ADD 15 MINUTES (ANESTHESIA): Performed by: INTERNAL MEDICINE

## 2024-04-16 PROCEDURE — 88305 TISSUE EXAM BY PATHOLOGIST: CPT

## 2024-04-16 PROCEDURE — 2709999900 HC NON-CHARGEABLE SUPPLY: Performed by: INTERNAL MEDICINE

## 2024-04-16 PROCEDURE — 7100000011 HC PHASE II RECOVERY - ADDTL 15 MIN: Performed by: INTERNAL MEDICINE

## 2024-04-16 RX ORDER — SODIUM CHLORIDE 9 MG/ML
25 INJECTION, SOLUTION INTRAVENOUS PRN
Status: DISCONTINUED | OUTPATIENT
Start: 2024-04-16 | End: 2024-04-16 | Stop reason: HOSPADM

## 2024-04-16 RX ORDER — PROPOFOL 10 MG/ML
INJECTION, EMULSION INTRAVENOUS PRN
Status: DISCONTINUED | OUTPATIENT
Start: 2024-04-16 | End: 2024-04-16 | Stop reason: SDUPTHER

## 2024-04-16 RX ORDER — PANTOPRAZOLE SODIUM 40 MG/1
40 TABLET, DELAYED RELEASE ORAL DAILY
Qty: 90 TABLET | Refills: 3 | Status: SHIPPED | OUTPATIENT
Start: 2024-04-16

## 2024-04-16 RX ORDER — LABETALOL HYDROCHLORIDE 5 MG/ML
INJECTION, SOLUTION INTRAVENOUS PRN
Status: DISCONTINUED | OUTPATIENT
Start: 2024-04-16 | End: 2024-04-16 | Stop reason: SDUPTHER

## 2024-04-16 RX ORDER — GLYCOPYRROLATE 0.2 MG/ML
INJECTION INTRAMUSCULAR; INTRAVENOUS PRN
Status: DISCONTINUED | OUTPATIENT
Start: 2024-04-16 | End: 2024-04-16 | Stop reason: SDUPTHER

## 2024-04-16 RX ADMIN — LABETALOL HYDROCHLORIDE 10 MG: 5 INJECTION, SOLUTION INTRAVENOUS at 10:58

## 2024-04-16 RX ADMIN — SODIUM CHLORIDE: 9 INJECTION, SOLUTION INTRAVENOUS at 10:42

## 2024-04-16 RX ADMIN — GLYCOPYRROLATE 0.2 MG: 0.2 INJECTION INTRAMUSCULAR; INTRAVENOUS at 10:43

## 2024-04-16 RX ADMIN — PROPOFOL 50 MG: 10 INJECTION, EMULSION INTRAVENOUS at 10:56

## 2024-04-16 RX ADMIN — PROPOFOL 50 MG: 10 INJECTION, EMULSION INTRAVENOUS at 10:49

## 2024-04-16 RX ADMIN — LABETALOL HYDROCHLORIDE 10 MG: 5 INJECTION, SOLUTION INTRAVENOUS at 10:51

## 2024-04-16 RX ADMIN — TOPICAL ANESTHETIC 1 EACH: 200 SPRAY DENTAL; PERIODONTAL at 10:43

## 2024-04-16 ASSESSMENT — PAIN - FUNCTIONAL ASSESSMENT
PAIN_FUNCTIONAL_ASSESSMENT: 0-10

## 2024-04-16 NOTE — ANESTHESIA POSTPROCEDURE EVALUATION
Department of Anesthesiology  Postprocedure Note    Patient: Lei Jaimes  MRN: [id number]  YOB: 1945  Date of evaluation: 4/16/2024    Procedure Summary       Date: 04/16/24 Room / Location: [facility] ENDO 01 / [facility]COPY    Anesthesia Start: 1042 Anesthesia Stop: 1109    Procedures:       ESOPHAGOGASTRODUODENOSCOPY BIOPSY (Mouth)      ESOPHAGOGASTRODUODENOSCOPY DILATION BALLOON (Mouth) Diagnosis:       Esophageal dysphagia      (Esophageal dysphagia [R13.19])    Surgeons: Autumn Soto Jr., MD Responsible Provider: Talat Correa APRN - CRNA    Anesthesia Type: MAC ASA Status: 3            Anesthesia Type: MAC    Ana Phase I: Ana Score: 10    Ana Phase II:      Anesthesia Post Evaluation    Patient location during evaluation: bedside  Patient participation: complete - patient participated  Level of consciousness: sleepy but conscious  Pain score: 0  Airway patency: patent  Nausea & Vomiting: no vomiting and no nausea  Cardiovascular status: blood pressure returned to baseline  Respiratory status: room air  Hydration status: stable  Multimodal analgesia pain management approach    No notable events documented.

## 2024-04-16 NOTE — DISCHARGE INSTRUCTIONS
For the next 12 hours you should not:   1. drive   2. drink alcohol   3. operate any machinery   4. engage in activities that require mental sharpness or manual dexterity such as     cooking   5. take any drugs other than those prescribed by a physician   6. make any legal or financial decisions    Call your doctor's office immediately, if there is is anything unusual:   1. increased and continuing rectal bleeding   2. fever   3. Unusual abdominal pain    Take it easy today and resume normal activity tomorrow.It is common to have gas and mild bloating for a few hours. Pain is NOT normal. You may be groggy off and on for a few hours.    Resume previous diet.        Autumn Soto Jr, MD  4/16/2024  11:12 AM

## 2024-04-16 NOTE — ANESTHESIA PRE PROCEDURE
Department of Anesthesiology  Preprocedure Note       Name:  Lei Jaimes   Age:  78 y.o.  :  1945                                          MRN:  [id number]         Date:  2024      Surgeon: Surgeon(s):  Autumn Soto Jr., MD    Procedure: Procedure(s):  ESOPHAGOGASTRODUODENOSCOPY WITH DILATION    Medications prior to admission:   Prior to Admission medications    Medication Sig Start Date End Date Taking? Authorizing Provider   NIFEdipine (ADALAT CC) 60 MG extended release tablet  24   ProviderBianca MD   lisinopril (PRINIVIL;ZESTRIL) 40 MG tablet Take 1 tablet by mouth daily 5/3/21   Automatic Reconciliation, Ar   pantoprazole (PROTONIX) 40 MG tablet Take 1 tablet by mouth daily 5/3/21   Automatic Reconciliation, Ar   hydroCHLOROthiazide (HYDRODIURIL) 25 MG tablet Take 0.5 tablets by mouth daily    ProviderBianca MD       Current medications:    Current Facility-Administered Medications   Medication Dose Route Frequency Provider Last Rate Last Admin   • 0.9 % sodium chloride infusion  25 mL IntraVENous PRN Autumn Soto Jr., MD           Allergies:  No Known Allergies    Problem List:    Patient Active Problem List   Diagnosis Code   • Dysphagia R13.10   • Left adrenal mass (HCC) E27.8   • Reflux esophagitis K21.00   • Stricture of esophagus K22.2   • GERD (gastroesophageal reflux disease) K21.9   • HTN (hypertension) I10   • BPH (benign prostatic hyperplasia) N40.0   • Flatulence R14.3   • S/P left inguinal hernia repair Z98.890, Z87.19   • Esophageal dysphagia R13.19       Past Medical History:        Diagnosis Date   • BPH (benign prostatic hyperplasia) 2021   • Colon polyps 2007    RECTAL POLYP   • Cyst of kidney, acquired     pt denies - states had kidney stones   • Dysphagia 2021   • Dysphagia    • Enlarged prostate    • Flatulence 2021   • GERD (gastroesophageal reflux disease)    • GERD (gastroesophageal reflux disease)    • HTN (hypertension)

## 2024-04-16 NOTE — INTERVAL H&P NOTE
Update History & Physical    The patient's History and Physical of April 16, 2024 was reviewed with the patient and I examined the patient. There was no change. The surgical site was confirmed by the patient and me.     Plan: The risks, benefits, expected outcome, and alternative to the recommended procedure have been discussed with the patient. Patient understands and wants to proceed with the procedure.     Electronically signed by Autumn Soto Jr, MD on 4/16/2024 at 10:03 AM

## 2024-04-16 NOTE — OP NOTE
EGD Procedure Note        Patient: Lei Jaimes MRN: [id number]  SSN: [id number]    YOB: 1945  Age: 78 y.o.  Sex: male        Date/Time:  4/16/2024 11:06 AM         IMPRESSION:       Lower esophageal stricture  Distal esophagitis (grade 2)  Antral gastritis       RECOMMENDATIONS:    Check biopsy results.  Repeat esophageal dilatation as needed  Continue the pantoprazole 40 mg daily.    Procedure: Esophagogastroduodenoscopy with cold biopsies                      Esophageal dilatation (pneumatic)    Indication: Esophageal dysphagia    Endoscopist:  Autumn Soto Jr, MD    Referring Provider:   Akash Contreras MD    History: The history and physical exam were reviewed and updated.     Endoscope: GIF H190 Olympus video endoscope    Extent of Exam: third portion of the duodenum    ASA: ASA 2 - Patient with mild systemic disease with no functional limitations    Anethesia/Sedation:  TIVA    Description of the procedure:   The procedure was discussed with the patient including risks, benefits, alternatives including risks of iv sedation, bleeding, perforation and aspiration.  A safety timeout was performed. The patient was placed in the left lateral decubitus position.  A bite block was placed.  The patient was using standard protocol.  The patients vital signs were monitored at all times including heart rate/rhythm, blood pressure and oxygen saturation.  The endoscope was then passed under direct visualization to the third portion of the duodenum.  The endoscope was then slowly withdrawn while visualizing the mucosa.  In the stomach a retroflexion was performed and gastric fundus and cardia visualized. The patient was then transferred to recovery in stable condition.                Findings:   Esophagus:The esophageal mucosa was inflamed in the distal esophagus consistent with grade 2 esophagitis.  Patient had a lower esophageal stricture which was tight as removed the endoscope fluid

## 2025-01-23 ENCOUNTER — HOSPITAL ENCOUNTER (EMERGENCY)
Facility: HOSPITAL | Age: 80
Discharge: HOME | End: 2025-01-24
Attending: EMERGENCY MEDICINE
Payer: MEDICARE

## 2025-01-23 VITALS
DIASTOLIC BLOOD PRESSURE: 60 MMHG | RESPIRATION RATE: 16 BRPM | BODY MASS INDEX: 22.9 KG/M2 | HEIGHT: 70 IN | OXYGEN SATURATION: 97 % | TEMPERATURE: 98 F | SYSTOLIC BLOOD PRESSURE: 177 MMHG | HEART RATE: 82 BPM | WEIGHT: 160 LBS

## 2025-01-23 DIAGNOSIS — N30.01 ACUTE CYSTITIS WITH HEMATURIA: Primary | ICD-10-CM

## 2025-01-23 LAB
ALBUMIN SERPL-MCNC: 3.9 G/DL (ref 3.5–5)
ALBUMIN/GLOB SERPL: 1 (ref 1.1–2.2)
ALP SERPL-CCNC: 75 U/L (ref 45–117)
ALT SERPL-CCNC: 18 U/L (ref 12–78)
ANION GAP SERPL CALC-SCNC: 10 MMOL/L (ref 2–12)
APPEARANCE UR: ABNORMAL
AST SERPL W P-5'-P-CCNC: 18 U/L (ref 15–37)
BACTERIA URNS QL MICRO: ABNORMAL /HPF
BASOPHILS # BLD: 0.05 K/UL (ref 0–0.1)
BASOPHILS NFR BLD: 0.5 % (ref 0–1)
BILIRUB SERPL-MCNC: 0.4 MG/DL (ref 0.2–1)
BILIRUB UR QL CFM: NEGATIVE
BUN SERPL-MCNC: 39 MG/DL (ref 6–20)
BUN/CREAT SERPL: 23 (ref 12–20)
CA-I BLD-MCNC: 9 MG/DL (ref 8.5–10.1)
CHLORIDE SERPL-SCNC: 100 MMOL/L (ref 97–108)
CO2 SERPL-SCNC: 27 MMOL/L (ref 21–32)
COLOR UR: ABNORMAL
CREAT SERPL-MCNC: 1.67 MG/DL (ref 0.7–1.3)
DIFFERENTIAL METHOD BLD: ABNORMAL
EOSINOPHIL # BLD: 0.16 K/UL (ref 0–0.4)
EOSINOPHIL NFR BLD: 1.7 % (ref 0–7)
ERYTHROCYTE [DISTWIDTH] IN BLOOD BY AUTOMATED COUNT: 12 % (ref 11.5–14.5)
GLOBULIN SER CALC-MCNC: 3.8 G/DL (ref 2–4)
GLUCOSE SERPL-MCNC: 102 MG/DL (ref 65–100)
GLUCOSE UR STRIP.AUTO-MCNC: NEGATIVE MG/DL
HCT VFR BLD AUTO: 34.9 % (ref 36.6–50.3)
HGB BLD-MCNC: 11.4 G/DL (ref 12.1–17)
HGB UR QL STRIP: ABNORMAL
IMM GRANULOCYTES # BLD AUTO: 0.02 K/UL (ref 0–0.04)
IMM GRANULOCYTES NFR BLD AUTO: 0.2 % (ref 0–0.5)
KETONES UR QL STRIP.AUTO: ABNORMAL MG/DL
LEUKOCYTE ESTERASE UR QL STRIP.AUTO: ABNORMAL
LYMPHOCYTES # BLD: 2.11 K/UL (ref 0.8–3.5)
LYMPHOCYTES NFR BLD: 22.3 % (ref 12–49)
MCH RBC QN AUTO: 31.8 PG (ref 26–34)
MCHC RBC AUTO-ENTMCNC: 32.7 G/DL (ref 30–36.5)
MCV RBC AUTO: 97.5 FL (ref 80–99)
MONOCYTES # BLD: 1.24 K/UL (ref 0–1)
MONOCYTES NFR BLD: 13.1 % (ref 5–13)
NEUTS SEG # BLD: 5.88 K/UL (ref 1.8–8)
NEUTS SEG NFR BLD: 62.2 % (ref 32–75)
NITRITE UR QL STRIP.AUTO: POSITIVE
NRBC # BLD: 0 K/UL (ref 0–0.01)
NRBC BLD-RTO: 0 PER 100 WBC
PH UR STRIP: 6.5 (ref 5–8)
PLATELET # BLD AUTO: 199 K/UL (ref 150–400)
PMV BLD AUTO: 10.5 FL (ref 8.9–12.9)
POTASSIUM SERPL-SCNC: 3.7 MMOL/L (ref 3.5–5.1)
PROT SERPL-MCNC: 7.7 G/DL (ref 6.4–8.2)
PROT UR STRIP-MCNC: >300 MG/DL
RBC # BLD AUTO: 3.58 M/UL (ref 4.1–5.7)
RBC #/AREA URNS HPF: >100 /HPF (ref 0–3)
SODIUM SERPL-SCNC: 137 MMOL/L (ref 136–145)
SP GR UR REFRACTOMETRY: 1.01 (ref 1–1.03)
URINE CULTURE IF INDICATED: ABNORMAL
UROBILINOGEN UR QL STRIP.AUTO: 1 EU/DL (ref 0.2–1)
WBC # BLD AUTO: 9.5 K/UL (ref 4.1–11.1)
WBC URNS QL MICRO: ABNORMAL /HPF (ref 0–5)

## 2025-01-23 PROCEDURE — 6360000002 HC RX W HCPCS: Performed by: EMERGENCY MEDICINE

## 2025-01-23 PROCEDURE — 85025 COMPLETE CBC W/AUTO DIFF WBC: CPT

## 2025-01-23 PROCEDURE — 2500000003 HC RX 250 WO HCPCS: Performed by: EMERGENCY MEDICINE

## 2025-01-23 PROCEDURE — 81001 URINALYSIS AUTO W/SCOPE: CPT

## 2025-01-23 PROCEDURE — 96374 THER/PROPH/DIAG INJ IV PUSH: CPT

## 2025-01-23 PROCEDURE — 99284 EMERGENCY DEPT VISIT MOD MDM: CPT

## 2025-01-23 PROCEDURE — 80053 COMPREHEN METABOLIC PANEL: CPT

## 2025-01-23 PROCEDURE — 87086 URINE CULTURE/COLONY COUNT: CPT

## 2025-01-23 RX ORDER — SULFAMETHOXAZOLE AND TRIMETHOPRIM 800; 160 MG/1; MG/1
1 TABLET ORAL 2 TIMES DAILY
Qty: 14 TABLET | Refills: 0 | Status: SHIPPED | OUTPATIENT
Start: 2025-01-23 | End: 2025-01-30

## 2025-01-23 RX ADMIN — CEFTRIAXONE SODIUM 1000 MG: 1 INJECTION, POWDER, FOR SOLUTION INTRAMUSCULAR; INTRAVENOUS at 22:38

## 2025-01-23 ASSESSMENT — PAIN - FUNCTIONAL ASSESSMENT: PAIN_FUNCTIONAL_ASSESSMENT: 0-10

## 2025-01-23 ASSESSMENT — PAIN SCALES - GENERAL: PAINLEVEL_OUTOF10: 0

## 2025-01-24 NOTE — ED NOTES
Pt presents with hematuria- no pain. Not on a blood thinner. Just voided- water and specimen cup given

## 2025-01-24 NOTE — DISCHARGE INSTRUCTIONS
[place] fluids.  Take antibiotic as directed for urinary tract infection..  Follow-up with urologist in the a.m. or call for an appointment.  If symptoms get worse return to the ED immediately.      Thank you for choosing our Emergency Department for your care.  It is our privilege to care for you in your time of need.  In the next several days, you may receive a survey via email or mailed to your home about your experience with our team.  We would greatly appreciate you taking a few minutes to complete the survey, as we use this information to learn what we have done well and what we could be doing better. Thank you for trusting us with your care!    Below you will find a list of your tests from today's visit.   Labs and Radiology Studies  Recent Results (from the past 12 hour(s))   Urinalysis with Reflex to Culture    Collection Time: 01/23/25  9:15 PM    Specimen: Urine   Result Value Ref Range    Color, UA Red      Appearance Cloudy (A) Clear      Specific Gravity, UA 1.015 1.003 - 1.030      pH, Urine 6.5 5.0 - 8.0      Protein, UA >300 (A) Negative mg/dL    Glucose, Ur Negative Negative mg/dL    Ketones, Urine Trace (A) Negative mg/dL    Blood, Urine Large (A) Negative      Urobilinogen, Urine 1.0 0.2 - 1.0 EU/dL    Nitrite, Urine Positive (A) Negative      Leukocyte Esterase, Urine Small (A) Negative      WBC, UA 10-20 0 - 5 /hpf    RBC, UA >100 (H) 0 - 3 /hpf    BACTERIA, URINE 1+ (A) Negative /hpf    Urine Culture if Indicated Urine Culture Ordered (A) Culture not indicated by UA result     Bilirubin, Confirmatory    Collection Time: 01/23/25  9:15 PM   Result Value Ref Range    Bilirubin Confirmation, UA Negative Negative     CBC with Auto Differential    Collection Time: 01/23/25 10:22 PM   Result Value Ref Range    WBC 9.5 4.1 - 11.1 K/uL    RBC 3.58 (L) 4.10 - 5.70 M/uL    Hemoglobin 11.4 (L) 12.1 - 17.0 g/dL    Hematocrit 34.9 (L) 36.6 - 50.3 %    MCV 97.5 80.0 - 99.0 FL    MCH 31.8 26.0 - 34.0 PG

## 2025-01-24 NOTE — ED PROVIDER NOTES
release tablet  Commonly known as: ADALAT CC     pantoprazole 40 MG tablet  Commonly known as: PROTONIX  Take 1 tablet by mouth daily                DISCONTINUED MEDICATIONS:  Current Discharge Medication List          I am the Primary Clinician of Record: Jade Morrell MD (electronically signed)    (Please note that parts of this dictation were completed with voice recognition software. Quite often unanticipated grammatical, syntax, homophones, and other interpretive errors are inadvertently transcribed by the computer software. Please disregards these errors. Please excuse any errors that have escaped final proofreading.)      Jade Morrell MD  01/23/25 5275

## 2025-01-25 LAB
BACTERIA SPEC CULT: NORMAL
Lab: NORMAL

## 2025-03-12 ENCOUNTER — APPOINTMENT (OUTPATIENT)
Facility: HOSPITAL | Age: 80
End: 2025-03-12
Payer: MEDICARE

## 2025-03-12 ENCOUNTER — HOSPITAL ENCOUNTER (OUTPATIENT)
Facility: HOSPITAL | Age: 80
Setting detail: OBSERVATION
Discharge: ANOTHER HOSPITAL | End: 2025-03-13
Attending: EMERGENCY MEDICINE | Admitting: STUDENT IN AN ORGANIZED HEALTH CARE EDUCATION/TRAINING PROGRAM
Payer: MEDICARE

## 2025-03-12 ENCOUNTER — APPOINTMENT (OUTPATIENT)
Facility: HOSPITAL | Age: 80
End: 2025-03-12
Attending: EMERGENCY MEDICINE
Payer: MEDICARE

## 2025-03-12 DIAGNOSIS — N30.00 ACUTE CYSTITIS WITHOUT HEMATURIA: Primary | ICD-10-CM

## 2025-03-12 DIAGNOSIS — R07.9 CHEST PAIN, UNSPECIFIED TYPE: ICD-10-CM

## 2025-03-12 LAB
ALBUMIN SERPL-MCNC: 4.1 G/DL (ref 3.5–5)
ALBUMIN/GLOB SERPL: 1.1 (ref 1.1–2.2)
ALP SERPL-CCNC: 67 U/L (ref 45–117)
ALT SERPL-CCNC: 20 U/L (ref 12–78)
ANION GAP SERPL CALC-SCNC: 9 MMOL/L (ref 2–12)
APPEARANCE UR: CLEAR
AST SERPL W P-5'-P-CCNC: 28 U/L (ref 15–37)
BACTERIA URNS QL MICRO: NEGATIVE /HPF
BASOPHILS # BLD: 0.02 K/UL (ref 0–0.1)
BASOPHILS # BLD: 0.04 K/UL (ref 0–0.1)
BASOPHILS NFR BLD: 0.1 % (ref 0–1)
BASOPHILS NFR BLD: 0.3 % (ref 0–1)
BILIRUB SERPL-MCNC: 0.9 MG/DL (ref 0.2–1)
BILIRUB UR QL: NEGATIVE
BUN SERPL-MCNC: 26 MG/DL (ref 6–20)
BUN/CREAT SERPL: 21 (ref 12–20)
CA-I BLD-MCNC: 9 MG/DL (ref 8.5–10.1)
CHLORIDE SERPL-SCNC: 103 MMOL/L (ref 97–108)
CO2 SERPL-SCNC: 24 MMOL/L (ref 21–32)
COLOR UR: ABNORMAL
CREAT SERPL-MCNC: 1.25 MG/DL (ref 0.7–1.3)
DIFFERENTIAL METHOD BLD: ABNORMAL
DIFFERENTIAL METHOD BLD: ABNORMAL
EOSINOPHIL # BLD: 0 K/UL (ref 0–0.4)
EOSINOPHIL # BLD: 0.15 K/UL (ref 0–0.4)
EOSINOPHIL NFR BLD: 0 % (ref 0–7)
EOSINOPHIL NFR BLD: 1 % (ref 0–7)
ERYTHROCYTE [DISTWIDTH] IN BLOOD BY AUTOMATED COUNT: 12.3 % (ref 11.5–14.5)
ERYTHROCYTE [DISTWIDTH] IN BLOOD BY AUTOMATED COUNT: 12.4 % (ref 11.5–14.5)
GLOBULIN SER CALC-MCNC: 3.6 G/DL (ref 2–4)
GLUCOSE SERPL-MCNC: 117 MG/DL (ref 65–100)
GLUCOSE UR STRIP.AUTO-MCNC: NEGATIVE MG/DL
HCT VFR BLD AUTO: 35.1 % (ref 36.6–50.3)
HCT VFR BLD AUTO: 37.9 % (ref 36.6–50.3)
HGB BLD-MCNC: 11.4 G/DL (ref 12.1–17)
HGB BLD-MCNC: 12.4 G/DL (ref 12.1–17)
HGB UR QL STRIP: ABNORMAL
IMM GRANULOCYTES # BLD AUTO: 0.07 K/UL (ref 0–0.04)
IMM GRANULOCYTES # BLD AUTO: 0.09 K/UL (ref 0–0.04)
IMM GRANULOCYTES NFR BLD AUTO: 0.4 % (ref 0–0.5)
IMM GRANULOCYTES NFR BLD AUTO: 0.5 % (ref 0–0.5)
KETONES UR QL STRIP.AUTO: NEGATIVE MG/DL
LACTATE SERPL-SCNC: 1.2 MMOL/L (ref 0.4–2)
LEUKOCYTE ESTERASE UR QL STRIP.AUTO: NEGATIVE
LIPASE SERPL-CCNC: 47 U/L (ref 13–75)
LYMPHOCYTES # BLD: 0.4 K/UL (ref 0.8–3.5)
LYMPHOCYTES # BLD: 1.17 K/UL (ref 0.8–3.5)
LYMPHOCYTES NFR BLD: 1.8 % (ref 12–49)
LYMPHOCYTES NFR BLD: 7.7 % (ref 12–49)
MCH RBC QN AUTO: 32.3 PG (ref 26–34)
MCH RBC QN AUTO: 32.6 PG (ref 26–34)
MCHC RBC AUTO-ENTMCNC: 32.5 G/DL (ref 30–36.5)
MCHC RBC AUTO-ENTMCNC: 32.7 G/DL (ref 30–36.5)
MCV RBC AUTO: 99.4 FL (ref 80–99)
MCV RBC AUTO: 99.7 FL (ref 80–99)
MONOCYTES # BLD: 1.33 K/UL (ref 0–1)
MONOCYTES # BLD: 2.18 K/UL (ref 0–1)
MONOCYTES NFR BLD: 8.8 % (ref 5–13)
MONOCYTES NFR BLD: 9.8 % (ref 5–13)
NEUTS SEG # BLD: 12.39 K/UL (ref 1.8–8)
NEUTS SEG # BLD: 19.51 K/UL (ref 1.8–8)
NEUTS SEG NFR BLD: 81.7 % (ref 32–75)
NEUTS SEG NFR BLD: 87.9 % (ref 32–75)
NITRITE UR QL STRIP.AUTO: NEGATIVE
NRBC # BLD: 0 K/UL (ref 0–0.01)
NRBC # BLD: 0 K/UL (ref 0–0.01)
NRBC BLD-RTO: 0 PER 100 WBC
NRBC BLD-RTO: 0 PER 100 WBC
PH UR STRIP: 6 (ref 5–8)
PLATELET # BLD AUTO: 168 K/UL (ref 150–400)
PLATELET # BLD AUTO: 172 K/UL (ref 150–400)
PMV BLD AUTO: 10.9 FL (ref 8.9–12.9)
PMV BLD AUTO: 11.4 FL (ref 8.9–12.9)
POTASSIUM SERPL-SCNC: 4.2 MMOL/L (ref 3.5–5.1)
PROT SERPL-MCNC: 7.7 G/DL (ref 6.4–8.2)
PROT UR STRIP-MCNC: 100 MG/DL
RBC # BLD AUTO: 3.53 M/UL (ref 4.1–5.7)
RBC # BLD AUTO: 3.8 M/UL (ref 4.1–5.7)
RBC #/AREA URNS HPF: ABNORMAL /HPF (ref 0–3)
RBC MORPH BLD: ABNORMAL
SODIUM SERPL-SCNC: 136 MMOL/L (ref 136–145)
SP GR UR REFRACTOMETRY: 1.02 (ref 1–1.03)
TROPONIN I SERPL HS-MCNC: 13 NG/L (ref 0–76)
URINE CULTURE IF INDICATED: ABNORMAL
UROBILINOGEN UR QL STRIP.AUTO: 0.2 EU/DL (ref 0.2–1)
WBC # BLD AUTO: 15.2 K/UL (ref 4.1–11.1)
WBC # BLD AUTO: 22.2 K/UL (ref 4.1–11.1)
WBC URNS QL MICRO: ABNORMAL /HPF (ref 0–5)

## 2025-03-12 PROCEDURE — 2580000003 HC RX 258: Performed by: EMERGENCY MEDICINE

## 2025-03-12 PROCEDURE — 6360000002 HC RX W HCPCS: Performed by: EMERGENCY MEDICINE

## 2025-03-12 PROCEDURE — 83690 ASSAY OF LIPASE: CPT

## 2025-03-12 PROCEDURE — G0378 HOSPITAL OBSERVATION PER HR: HCPCS

## 2025-03-12 PROCEDURE — 83605 ASSAY OF LACTIC ACID: CPT

## 2025-03-12 PROCEDURE — 99285 EMERGENCY DEPT VISIT HI MDM: CPT

## 2025-03-12 PROCEDURE — 71045 X-RAY EXAM CHEST 1 VIEW: CPT

## 2025-03-12 PROCEDURE — 6360000004 HC RX CONTRAST MEDICATION: Performed by: EMERGENCY MEDICINE

## 2025-03-12 PROCEDURE — 93005 ELECTROCARDIOGRAM TRACING: CPT | Performed by: EMERGENCY MEDICINE

## 2025-03-12 PROCEDURE — 84484 ASSAY OF TROPONIN QUANT: CPT

## 2025-03-12 PROCEDURE — 74177 CT ABD & PELVIS W/CONTRAST: CPT

## 2025-03-12 PROCEDURE — 96375 TX/PRO/DX INJ NEW DRUG ADDON: CPT

## 2025-03-12 PROCEDURE — 80053 COMPREHEN METABOLIC PANEL: CPT

## 2025-03-12 PROCEDURE — 81001 URINALYSIS AUTO W/SCOPE: CPT

## 2025-03-12 PROCEDURE — 96376 TX/PRO/DX INJ SAME DRUG ADON: CPT

## 2025-03-12 PROCEDURE — 85025 COMPLETE CBC W/AUTO DIFF WBC: CPT

## 2025-03-12 PROCEDURE — 87040 BLOOD CULTURE FOR BACTERIA: CPT

## 2025-03-12 PROCEDURE — 96365 THER/PROPH/DIAG IV INF INIT: CPT

## 2025-03-12 PROCEDURE — 36415 COLL VENOUS BLD VENIPUNCTURE: CPT

## 2025-03-12 RX ORDER — LEVOFLOXACIN 5 MG/ML
750 INJECTION, SOLUTION INTRAVENOUS
Status: COMPLETED | OUTPATIENT
Start: 2025-03-12 | End: 2025-03-12

## 2025-03-12 RX ORDER — MAGNESIUM SULFATE IN WATER 40 MG/ML
2000 INJECTION, SOLUTION INTRAVENOUS PRN
Status: DISCONTINUED | OUTPATIENT
Start: 2025-03-12 | End: 2025-03-13 | Stop reason: HOSPADM

## 2025-03-12 RX ORDER — MORPHINE SULFATE 4 MG/ML
4 INJECTION, SOLUTION INTRAMUSCULAR; INTRAVENOUS
Refills: 0 | Status: DISCONTINUED | OUTPATIENT
Start: 2025-03-12 | End: 2025-03-13 | Stop reason: HOSPADM

## 2025-03-12 RX ORDER — ATORVASTATIN CALCIUM 40 MG/1
40 TABLET, FILM COATED ORAL NIGHTLY
Status: DISCONTINUED | OUTPATIENT
Start: 2025-03-13 | End: 2025-03-13

## 2025-03-12 RX ORDER — SODIUM CHLORIDE 0.9 % (FLUSH) 0.9 %
5-40 SYRINGE (ML) INJECTION EVERY 12 HOURS SCHEDULED
Status: DISCONTINUED | OUTPATIENT
Start: 2025-03-13 | End: 2025-03-13 | Stop reason: SDUPTHER

## 2025-03-12 RX ORDER — SODIUM CHLORIDE 9 MG/ML
INJECTION, SOLUTION INTRAVENOUS PRN
Status: DISCONTINUED | OUTPATIENT
Start: 2025-03-12 | End: 2025-03-13 | Stop reason: HOSPADM

## 2025-03-12 RX ORDER — ACETAMINOPHEN 325 MG/1
650 TABLET ORAL EVERY 6 HOURS PRN
Status: DISCONTINUED | OUTPATIENT
Start: 2025-03-12 | End: 2025-03-13 | Stop reason: SDUPTHER

## 2025-03-12 RX ORDER — LIDOCAINE HYDROCHLORIDE 20 MG/ML
15 SOLUTION OROPHARYNGEAL
Status: DISCONTINUED | OUTPATIENT
Start: 2025-03-12 | End: 2025-03-13 | Stop reason: HOSPADM

## 2025-03-12 RX ORDER — ACETAMINOPHEN 325 MG/1
650 TABLET ORAL EVERY 6 HOURS PRN
Status: DISCONTINUED | OUTPATIENT
Start: 2025-03-12 | End: 2025-03-13 | Stop reason: HOSPADM

## 2025-03-12 RX ORDER — NITROGLYCERIN 0.4 MG/1
0.4 TABLET SUBLINGUAL EVERY 5 MIN PRN
Status: DISCONTINUED | OUTPATIENT
Start: 2025-03-12 | End: 2025-03-13 | Stop reason: HOSPADM

## 2025-03-12 RX ORDER — LISINOPRIL 20 MG/1
40 TABLET ORAL DAILY
Status: DISCONTINUED | OUTPATIENT
Start: 2025-03-13 | End: 2025-03-13

## 2025-03-12 RX ORDER — SODIUM CHLORIDE 0.9 % (FLUSH) 0.9 %
5-40 SYRINGE (ML) INJECTION PRN
Status: DISCONTINUED | OUTPATIENT
Start: 2025-03-12 | End: 2025-03-13 | Stop reason: SDUPTHER

## 2025-03-12 RX ORDER — HYDROCHLOROTHIAZIDE 25 MG/1
12.5 TABLET ORAL DAILY
Status: DISCONTINUED | OUTPATIENT
Start: 2025-03-13 | End: 2025-03-13

## 2025-03-12 RX ORDER — ASPIRIN 81 MG/1
81 TABLET, CHEWABLE ORAL DAILY
Status: DISCONTINUED | OUTPATIENT
Start: 2025-03-13 | End: 2025-03-13

## 2025-03-12 RX ORDER — SODIUM CHLORIDE 9 MG/ML
INJECTION, SOLUTION INTRAVENOUS PRN
Status: DISCONTINUED | OUTPATIENT
Start: 2025-03-12 | End: 2025-03-13 | Stop reason: SDUPTHER

## 2025-03-12 RX ORDER — ONDANSETRON 4 MG/1
4 TABLET, ORALLY DISINTEGRATING ORAL EVERY 8 HOURS PRN
Status: DISCONTINUED | OUTPATIENT
Start: 2025-03-12 | End: 2025-03-12

## 2025-03-12 RX ORDER — ONDANSETRON 2 MG/ML
4 INJECTION INTRAMUSCULAR; INTRAVENOUS ONCE
Status: COMPLETED | OUTPATIENT
Start: 2025-03-12 | End: 2025-03-12

## 2025-03-12 RX ORDER — ACETAMINOPHEN 650 MG/1
650 SUPPOSITORY RECTAL EVERY 6 HOURS PRN
Status: DISCONTINUED | OUTPATIENT
Start: 2025-03-12 | End: 2025-03-13 | Stop reason: HOSPADM

## 2025-03-12 RX ORDER — SODIUM CHLORIDE 0.9 % (FLUSH) 0.9 %
5-40 SYRINGE (ML) INJECTION EVERY 12 HOURS SCHEDULED
Status: DISCONTINUED | OUTPATIENT
Start: 2025-03-12 | End: 2025-03-13 | Stop reason: HOSPADM

## 2025-03-12 RX ORDER — ONDANSETRON 2 MG/ML
4 INJECTION INTRAMUSCULAR; INTRAVENOUS EVERY 6 HOURS PRN
Status: DISCONTINUED | OUTPATIENT
Start: 2025-03-12 | End: 2025-03-12

## 2025-03-12 RX ORDER — POLYETHYLENE GLYCOL 3350 17 G/17G
17 POWDER, FOR SOLUTION ORAL DAILY PRN
Status: DISCONTINUED | OUTPATIENT
Start: 2025-03-12 | End: 2025-03-13 | Stop reason: HOSPADM

## 2025-03-12 RX ORDER — ONDANSETRON 2 MG/ML
4 INJECTION INTRAMUSCULAR; INTRAVENOUS EVERY 6 HOURS PRN
Status: DISCONTINUED | OUTPATIENT
Start: 2025-03-12 | End: 2025-03-13 | Stop reason: HOSPADM

## 2025-03-12 RX ORDER — ACETAMINOPHEN 650 MG/1
650 SUPPOSITORY RECTAL EVERY 6 HOURS PRN
Status: DISCONTINUED | OUTPATIENT
Start: 2025-03-12 | End: 2025-03-13 | Stop reason: SDUPTHER

## 2025-03-12 RX ORDER — ENOXAPARIN SODIUM 100 MG/ML
40 INJECTION SUBCUTANEOUS DAILY
Status: DISCONTINUED | OUTPATIENT
Start: 2025-03-13 | End: 2025-03-13

## 2025-03-12 RX ORDER — POTASSIUM CHLORIDE 1500 MG/1
40 TABLET, EXTENDED RELEASE ORAL PRN
Status: DISCONTINUED | OUTPATIENT
Start: 2025-03-12 | End: 2025-03-13 | Stop reason: HOSPADM

## 2025-03-12 RX ORDER — SODIUM CHLORIDE 0.9 % (FLUSH) 0.9 %
5-40 SYRINGE (ML) INJECTION PRN
Status: DISCONTINUED | OUTPATIENT
Start: 2025-03-12 | End: 2025-03-13 | Stop reason: HOSPADM

## 2025-03-12 RX ORDER — POTASSIUM CHLORIDE 7.45 MG/ML
10 INJECTION INTRAVENOUS PRN
Status: DISCONTINUED | OUTPATIENT
Start: 2025-03-12 | End: 2025-03-13 | Stop reason: HOSPADM

## 2025-03-12 RX ORDER — MORPHINE SULFATE 2 MG/ML
2 INJECTION, SOLUTION INTRAMUSCULAR; INTRAVENOUS
Refills: 0 | Status: DISCONTINUED | OUTPATIENT
Start: 2025-03-12 | End: 2025-03-13 | Stop reason: HOSPADM

## 2025-03-12 RX ORDER — NIFEDIPINE 30 MG/1
60 TABLET, EXTENDED RELEASE ORAL DAILY
Status: DISCONTINUED | OUTPATIENT
Start: 2025-03-13 | End: 2025-03-13 | Stop reason: HOSPADM

## 2025-03-12 RX ORDER — IOPAMIDOL 755 MG/ML
100 INJECTION, SOLUTION INTRAVASCULAR
Status: COMPLETED | OUTPATIENT
Start: 2025-03-12 | End: 2025-03-12

## 2025-03-12 RX ADMIN — IOPAMIDOL 100 ML: 755 INJECTION, SOLUTION INTRAVENOUS at 12:39

## 2025-03-12 RX ADMIN — ONDANSETRON 4 MG: 2 INJECTION, SOLUTION INTRAMUSCULAR; INTRAVENOUS at 21:54

## 2025-03-12 RX ADMIN — ONDANSETRON 4 MG: 2 INJECTION, SOLUTION INTRAMUSCULAR; INTRAVENOUS at 12:44

## 2025-03-12 RX ADMIN — SODIUM CHLORIDE 40 MG: 9 INJECTION, SOLUTION INTRAMUSCULAR; INTRAVENOUS; SUBCUTANEOUS at 12:44

## 2025-03-12 RX ADMIN — LEVOFLOXACIN 750 MG: 5 INJECTION, SOLUTION INTRAVENOUS at 14:38

## 2025-03-12 ASSESSMENT — PAIN - FUNCTIONAL ASSESSMENT: PAIN_FUNCTIONAL_ASSESSMENT: 0-10

## 2025-03-12 ASSESSMENT — PAIN SCALES - GENERAL
PAINLEVEL_OUTOF10: 0
PAINLEVEL_OUTOF10: 0

## 2025-03-12 NOTE — ED PROVIDER NOTES
Physical abuse: Denies     Verbal abuse: Denies     Emotional abuse: Denies     Financial abuse: Denies     Sexual abuse: Denies   Utilities: Not on file       PHYSICAL EXAM   Physical Exam  Vitals and nursing note reviewed.   Constitutional:       General: He is not in acute distress.     Appearance: Normal appearance. He is normal weight. He is not ill-appearing, toxic-appearing or diaphoretic.   HENT:      Head: Normocephalic and atraumatic.      Nose: Nose normal.      Mouth/Throat:      Mouth: Mucous membranes are moist.      Pharynx: No oropharyngeal exudate.   Eyes:      Extraocular Movements: Extraocular movements intact.      Conjunctiva/sclera: Conjunctivae normal.      Pupils: Pupils are equal, round, and reactive to light.   Cardiovascular:      Rate and Rhythm: Normal rate and regular rhythm.      Pulses: Normal pulses.      Heart sounds: Normal heart sounds.   Pulmonary:      Effort: Pulmonary effort is normal.      Breath sounds: Normal breath sounds.   Abdominal:      General: Bowel sounds are normal.      Palpations: Abdomen is soft.      Tenderness: There is no abdominal tenderness.   Musculoskeletal:         General: Normal range of motion.      Cervical back: Normal range of motion and neck supple.   Skin:     General: Skin is warm and dry.      Capillary Refill: Capillary refill takes less than 2 seconds.   Neurological:      General: No focal deficit present.      Mental Status: He is alert and oriented to person, place, and time.   Psychiatric:         Mood and Affect: Mood normal.         Behavior: Behavior normal.           SCREENINGS              LAB, EKG AND DIAGNOSTIC RESULTS   Labs:  Recent Results (from the past 12 hours)   EKG 12 Lead    Collection Time: 03/12/25 11:03 AM   Result Value Ref Range    Ventricular Rate 96 BPM    Atrial Rate 96 BPM    P-R Interval 229 ms    QRS Duration 95 ms    Q-T Interval 351 ms    QTc Calculation (Bazett) 444 ms    P Axis 49 degrees    R Axis 81

## 2025-03-12 NOTE — ED TRIAGE NOTES
Pt reports vomiting, upper abd and cp that started while eating, PT reports symptoms have now resolved. PT in NAD at this time. Reports pain with swallowing

## 2025-03-13 ENCOUNTER — APPOINTMENT (OUTPATIENT)
Facility: HOSPITAL | Age: 80
DRG: 380 | End: 2025-03-13
Attending: STUDENT IN AN ORGANIZED HEALTH CARE EDUCATION/TRAINING PROGRAM
Payer: MEDICARE

## 2025-03-13 ENCOUNTER — HOSPITAL ENCOUNTER (INPATIENT)
Facility: HOSPITAL | Age: 80
LOS: 21 days | Discharge: HOME HEALTH | DRG: 380 | End: 2025-04-03
Attending: STUDENT IN AN ORGANIZED HEALTH CARE EDUCATION/TRAINING PROGRAM | Admitting: STUDENT IN AN ORGANIZED HEALTH CARE EDUCATION/TRAINING PROGRAM
Payer: MEDICARE

## 2025-03-13 ENCOUNTER — APPOINTMENT (OUTPATIENT)
Facility: HOSPITAL | Age: 80
End: 2025-03-13
Attending: FAMILY MEDICINE
Payer: MEDICARE

## 2025-03-13 VITALS
TEMPERATURE: 98.5 F | BODY MASS INDEX: 23.98 KG/M2 | HEART RATE: 102 BPM | DIASTOLIC BLOOD PRESSURE: 58 MMHG | SYSTOLIC BLOOD PRESSURE: 156 MMHG | HEIGHT: 70 IN | OXYGEN SATURATION: 94 % | RESPIRATION RATE: 22 BRPM | WEIGHT: 167.5 LBS

## 2025-03-13 DIAGNOSIS — I24.9 ACUTE CORONARY SYNDROME (HCC): ICD-10-CM

## 2025-03-13 DIAGNOSIS — R07.9 CHEST PAIN, UNSPECIFIED TYPE: Primary | ICD-10-CM

## 2025-03-13 PROBLEM — K92.2 GIB (GASTROINTESTINAL BLEEDING): Status: ACTIVE | Noted: 2025-03-13

## 2025-03-13 PROBLEM — N18.32 ACUTE KIDNEY INJURY SUPERIMPOSED ON STAGE 3B CHRONIC KIDNEY DISEASE (HCC): Status: ACTIVE | Noted: 2025-03-13

## 2025-03-13 PROBLEM — I21.4 NSTEMI (NON-ST ELEVATED MYOCARDIAL INFARCTION) (HCC): Status: ACTIVE | Noted: 2025-03-13

## 2025-03-13 PROBLEM — N17.9 ACUTE KIDNEY INJURY SUPERIMPOSED ON STAGE 3B CHRONIC KIDNEY DISEASE (HCC): Status: ACTIVE | Noted: 2025-03-13

## 2025-03-13 LAB
ANION GAP SERPL CALC-SCNC: 11 MMOL/L (ref 2–12)
APTT PPP: 37.3 SEC (ref 21.2–34.1)
BASOPHILS # BLD: 0.01 K/UL (ref 0–0.1)
BASOPHILS NFR BLD: 0.1 % (ref 0–1)
BNP SERPL-MCNC: 3884 PG/ML
BUN SERPL-MCNC: 46 MG/DL (ref 6–20)
BUN/CREAT SERPL: 28 (ref 12–20)
CA-I BLD-MCNC: 8.8 MG/DL (ref 8.5–10.1)
CHLORIDE SERPL-SCNC: 105 MMOL/L (ref 97–108)
CHOLEST SERPL-MCNC: 150 MG/DL
CK SERPL-CCNC: 67 U/L (ref 39–308)
CO2 SERPL-SCNC: 24 MMOL/L (ref 21–32)
CREAT SERPL-MCNC: 1.66 MG/DL (ref 0.7–1.3)
CRP SERPL-MCNC: 12.5 MG/DL (ref 0–0.3)
DIFFERENTIAL METHOD BLD: ABNORMAL
ECHO AO SINUS VALSALVA DIAM: 4 CM
ECHO AO SINUS VALSALVA INDEX: 2.06 CM/M2
ECHO AV AREA PEAK VELOCITY: 3.5 CM2
ECHO AV AREA VTI: 3.9 CM2
ECHO AV AREA/BSA PEAK VELOCITY: 1.8 CM2/M2
ECHO AV AREA/BSA VTI: 2 CM2/M2
ECHO AV MEAN GRADIENT: 5 MMHG
ECHO AV MEAN VELOCITY: 1 M/S
ECHO AV PEAK GRADIENT: 10 MMHG
ECHO AV PEAK VELOCITY: 1.6 M/S
ECHO AV VELOCITY RATIO: 0.75
ECHO AV VTI: 22.1 CM
ECHO BSA: 1.94 M2
ECHO EST RA PRESSURE: 3 MMHG
ECHO LA DIAMETER INDEX: 1.39 CM/M2
ECHO LA DIAMETER: 2.7 CM
ECHO LA VOL A-L A2C: 27 ML (ref 18–58)
ECHO LA VOL A-L A4C: 19 ML (ref 18–58)
ECHO LA VOL BP: 22 ML (ref 18–58)
ECHO LA VOL MOD A2C: 25 ML (ref 18–58)
ECHO LA VOL MOD A4C: 17 ML (ref 18–58)
ECHO LA VOL/BSA BIPLANE: 11 ML/M2 (ref 16–34)
ECHO LA VOLUME AREA LENGTH: 24 ML
ECHO LA VOLUME INDEX A-L A2C: 14 ML/M2 (ref 16–34)
ECHO LA VOLUME INDEX A-L A4C: 10 ML/M2 (ref 16–34)
ECHO LA VOLUME INDEX AREA LENGTH: 12 ML/M2 (ref 16–34)
ECHO LA VOLUME INDEX MOD A2C: 13 ML/M2 (ref 16–34)
ECHO LA VOLUME INDEX MOD A4C: 9 ML/M2 (ref 16–34)
ECHO LV E' LATERAL VELOCITY: 6.44 CM/S
ECHO LV E' SEPTAL VELOCITY: 5.58 CM/S
ECHO LV EDV A2C: 57 ML
ECHO LV EDV A4C: 79 ML
ECHO LV EDV BP: 70 ML (ref 67–155)
ECHO LV EDV INDEX A4C: 41 ML/M2
ECHO LV EDV INDEX BP: 36 ML/M2
ECHO LV EDV NDEX A2C: 29 ML/M2
ECHO LV EF PHYSICIAN: 52 %
ECHO LV EJECTION FRACTION A2C: 54 %
ECHO LV EJECTION FRACTION A4C: 46 %
ECHO LV EJECTION FRACTION BIPLANE: 52 % (ref 55–100)
ECHO LV ESV A2C: 26 ML
ECHO LV ESV A4C: 43 ML
ECHO LV ESV BP: 34 ML (ref 22–58)
ECHO LV ESV INDEX A2C: 13 ML/M2
ECHO LV ESV INDEX A4C: 22 ML/M2
ECHO LV ESV INDEX BP: 18 ML/M2
ECHO LV FRACTIONAL SHORTENING: 26 % (ref 28–44)
ECHO LV GLOBAL LONGITUDINAL STRAIN (GLS): -12.3 %
ECHO LV INTERNAL DIMENSION DIASTOLE INDEX: 2.42 CM/M2
ECHO LV INTERNAL DIMENSION DIASTOLIC: 4.7 CM (ref 4.2–5.9)
ECHO LV INTERNAL DIMENSION SYSTOLIC INDEX: 1.8 CM/M2
ECHO LV INTERNAL DIMENSION SYSTOLIC: 3.5 CM
ECHO LV IVSD: 1.3 CM (ref 0.6–1)
ECHO LV MASS 2D: 212 G (ref 88–224)
ECHO LV MASS INDEX 2D: 109.3 G/M2 (ref 49–115)
ECHO LV POSTERIOR WALL DIASTOLIC: 1.1 CM (ref 0.6–1)
ECHO LV RELATIVE WALL THICKNESS RATIO: 0.47
ECHO LVOT AREA: 4.5 CM2
ECHO LVOT AV VTI INDEX: 0.85
ECHO LVOT DIAM: 2.4 CM
ECHO LVOT MEAN GRADIENT: 3 MMHG
ECHO LVOT PEAK GRADIENT: 6 MMHG
ECHO LVOT PEAK VELOCITY: 1.2 M/S
ECHO LVOT STROKE VOLUME INDEX: 43.6 ML/M2
ECHO LVOT SV: 84.6 ML
ECHO LVOT VTI: 18.7 CM
ECHO MV AREA PHT: 8.5 CM2
ECHO MV E VELOCITY: 1.02 M/S
ECHO MV E/E' LATERAL: 15.84
ECHO MV E/E' RATIO (AVERAGED): 17.06
ECHO MV E/E' SEPTAL: 18.28
ECHO MV PRESSURE HALF TIME (PHT): 26 MS
ECHO RIGHT VENTRICULAR SYSTOLIC PRESSURE (RVSP): 25 MMHG
ECHO RV FREE WALL PEAK S': 21 CM/S
ECHO RV INTERNAL DIMENSION: 2.6 CM
ECHO RV TAPSE: 2.6 CM (ref 1.7–?)
ECHO TV REGURGITANT MAX VELOCITY: 2.33 M/S
ECHO TV REGURGITANT PEAK GRADIENT: 22 MMHG
EKG ATRIAL RATE: 115 BPM
EKG ATRIAL RATE: 115 BPM
EKG ATRIAL RATE: 95 BPM
EKG ATRIAL RATE: 96 BPM
EKG DIAGNOSIS: NORMAL
EKG P AXIS: 49 DEGREES
EKG P AXIS: 68 DEGREES
EKG P AXIS: 74 DEGREES
EKG P AXIS: 78 DEGREES
EKG P-R INTERVAL: 184 MS
EKG P-R INTERVAL: 186 MS
EKG P-R INTERVAL: 188 MS
EKG P-R INTERVAL: 229 MS
EKG Q-T INTERVAL: 308 MS
EKG Q-T INTERVAL: 323 MS
EKG Q-T INTERVAL: 351 MS
EKG Q-T INTERVAL: 362 MS
EKG QRS DURATION: 86 MS
EKG QRS DURATION: 89 MS
EKG QRS DURATION: 90 MS
EKG QRS DURATION: 95 MS
EKG QTC CALCULATION (BAZETT): 425 MS
EKG QTC CALCULATION (BAZETT): 443 MS
EKG QTC CALCULATION (BAZETT): 444 MS
EKG QTC CALCULATION (BAZETT): 454 MS
EKG R AXIS: 61 DEGREES
EKG R AXIS: 67 DEGREES
EKG R AXIS: 78 DEGREES
EKG R AXIS: 81 DEGREES
EKG T AXIS: 262 DEGREES
EKG T AXIS: 66 DEGREES
EKG T AXIS: 7 DEGREES
EKG T AXIS: 87 DEGREES
EKG VENTRICULAR RATE: 113 BPM
EKG VENTRICULAR RATE: 114 BPM
EKG VENTRICULAR RATE: 95 BPM
EKG VENTRICULAR RATE: 96 BPM
EOSINOPHIL # BLD: 0 K/UL (ref 0–0.4)
EOSINOPHIL NFR BLD: 0 % (ref 0–7)
ERYTHROCYTE [DISTWIDTH] IN BLOOD BY AUTOMATED COUNT: 12.4 % (ref 11.5–14.5)
ERYTHROCYTE [DISTWIDTH] IN BLOOD BY AUTOMATED COUNT: 12.4 % (ref 11.5–14.5)
ERYTHROCYTE [DISTWIDTH] IN BLOOD BY AUTOMATED COUNT: 12.5 % (ref 11.5–14.5)
EST. AVERAGE GLUCOSE BLD GHB EST-MCNC: 74 MG/DL
GLUCOSE SERPL-MCNC: 134 MG/DL (ref 65–100)
HBA1C MFR BLD: 4.2 % (ref 4–5.6)
HCT VFR BLD AUTO: 25.2 % (ref 36.6–50.3)
HCT VFR BLD AUTO: 32.9 % (ref 36.6–50.3)
HCT VFR BLD AUTO: 33.3 % (ref 36.6–50.3)
HDLC SERPL-MCNC: 72 MG/DL
HDLC SERPL: 2.1 (ref 0–5)
HGB BLD-MCNC: 10.7 G/DL (ref 12.1–17)
HGB BLD-MCNC: 10.8 G/DL (ref 12.1–17)
HGB BLD-MCNC: 8.1 G/DL (ref 12.1–17)
IMM GRANULOCYTES # BLD AUTO: 0.12 K/UL (ref 0–0.04)
IMM GRANULOCYTES NFR BLD AUTO: 0.6 % (ref 0–0.5)
INR PPP: 1.3 (ref 0.9–1.1)
LACTATE SERPL-SCNC: 1.6 MMOL/L (ref 0.4–2)
LDLC SERPL CALC-MCNC: 69.6 MG/DL (ref 0–100)
LIPID PANEL: NORMAL
LYMPHOCYTES # BLD: 0.72 K/UL (ref 0.8–3.5)
LYMPHOCYTES NFR BLD: 3.8 % (ref 12–49)
MCH RBC QN AUTO: 32.1 PG (ref 26–34)
MCH RBC QN AUTO: 32.3 PG (ref 26–34)
MCH RBC QN AUTO: 32.4 PG (ref 26–34)
MCHC RBC AUTO-ENTMCNC: 32.1 G/DL (ref 30–36.5)
MCHC RBC AUTO-ENTMCNC: 32.4 G/DL (ref 30–36.5)
MCHC RBC AUTO-ENTMCNC: 32.5 G/DL (ref 30–36.5)
MCV RBC AUTO: 100.4 FL (ref 80–99)
MCV RBC AUTO: 99.1 FL (ref 80–99)
MCV RBC AUTO: 99.7 FL (ref 80–99)
MONOCYTES # BLD: 1.6 K/UL (ref 0–1)
MONOCYTES NFR BLD: 8.4 % (ref 5–13)
MRSA DNA SPEC QL NAA+PROBE: NOT DETECTED
NEUTS SEG # BLD: 16.55 K/UL (ref 1.8–8)
NEUTS SEG NFR BLD: 87.1 % (ref 32–75)
NRBC # BLD: 0 K/UL (ref 0–0.01)
NRBC BLD-RTO: 0 PER 100 WBC
PLATELET # BLD AUTO: 144 K/UL (ref 150–400)
PLATELET # BLD AUTO: 169 K/UL (ref 150–400)
PLATELET # BLD AUTO: 172 K/UL (ref 150–400)
PMV BLD AUTO: 11.3 FL (ref 8.9–12.9)
PMV BLD AUTO: 11.4 FL (ref 8.9–12.9)
PMV BLD AUTO: 12.3 FL (ref 8.9–12.9)
POTASSIUM SERPL-SCNC: 4.2 MMOL/L (ref 3.5–5.1)
PROCALCITONIN SERPL-MCNC: 8.57 NG/ML
PROTHROMBIN TIME: 16.6 SEC (ref 11.9–14.6)
RBC # BLD AUTO: 2.51 M/UL (ref 4.1–5.7)
RBC # BLD AUTO: 3.3 M/UL (ref 4.1–5.7)
RBC # BLD AUTO: 3.36 M/UL (ref 4.1–5.7)
SODIUM SERPL-SCNC: 140 MMOL/L (ref 136–145)
THERAPEUTIC RANGE: ABNORMAL SEC (ref 82–109)
TRIGL SERPL-MCNC: 42 MG/DL
TROPONIN I SERPL HS-MCNC: 101 NG/L (ref 0–76)
TROPONIN I SERPL HS-MCNC: 129 NG/L (ref 0–76)
TROPONIN I SERPL HS-MCNC: 71 NG/L (ref 0–76)
TROPONIN I SERPL HS-MCNC: 90 NG/L (ref 0–76)
VLDLC SERPL CALC-MCNC: 8.4 MG/DL
WBC # BLD AUTO: 19 K/UL (ref 4.1–11.1)
WBC # BLD AUTO: 22.2 K/UL (ref 4.1–11.1)
WBC # BLD AUTO: 23.9 K/UL (ref 4.1–11.1)

## 2025-03-13 PROCEDURE — 6360000002 HC RX W HCPCS: Performed by: STUDENT IN AN ORGANIZED HEALTH CARE EDUCATION/TRAINING PROGRAM

## 2025-03-13 PROCEDURE — 6360000002 HC RX W HCPCS: Performed by: FAMILY MEDICINE

## 2025-03-13 PROCEDURE — 2580000003 HC RX 258: Performed by: STUDENT IN AN ORGANIZED HEALTH CARE EDUCATION/TRAINING PROGRAM

## 2025-03-13 PROCEDURE — 86900 BLOOD TYPING SEROLOGIC ABO: CPT

## 2025-03-13 PROCEDURE — 96367 TX/PROPH/DG ADDL SEQ IV INF: CPT

## 2025-03-13 PROCEDURE — 71275 CT ANGIOGRAPHY CHEST: CPT

## 2025-03-13 PROCEDURE — 85027 COMPLETE CBC AUTOMATED: CPT

## 2025-03-13 PROCEDURE — 82550 ASSAY OF CK (CPK): CPT

## 2025-03-13 PROCEDURE — 83605 ASSAY OF LACTIC ACID: CPT

## 2025-03-13 PROCEDURE — 96376 TX/PRO/DX INJ SAME DRUG ADON: CPT

## 2025-03-13 PROCEDURE — 84484 ASSAY OF TROPONIN QUANT: CPT

## 2025-03-13 PROCEDURE — 83880 ASSAY OF NATRIURETIC PEPTIDE: CPT

## 2025-03-13 PROCEDURE — 93005 ELECTROCARDIOGRAM TRACING: CPT

## 2025-03-13 PROCEDURE — 80048 BASIC METABOLIC PNL TOTAL CA: CPT

## 2025-03-13 PROCEDURE — 85610 PROTHROMBIN TIME: CPT

## 2025-03-13 PROCEDURE — 83036 HEMOGLOBIN GLYCOSYLATED A1C: CPT

## 2025-03-13 PROCEDURE — 80061 LIPID PANEL: CPT

## 2025-03-13 PROCEDURE — 71045 X-RAY EXAM CHEST 1 VIEW: CPT

## 2025-03-13 PROCEDURE — 6370000000 HC RX 637 (ALT 250 FOR IP): Performed by: STUDENT IN AN ORGANIZED HEALTH CARE EDUCATION/TRAINING PROGRAM

## 2025-03-13 PROCEDURE — 2580000003 HC RX 258: Performed by: FAMILY MEDICINE

## 2025-03-13 PROCEDURE — 2500000003 HC RX 250 WO HCPCS: Performed by: STUDENT IN AN ORGANIZED HEALTH CARE EDUCATION/TRAINING PROGRAM

## 2025-03-13 PROCEDURE — 96372 THER/PROPH/DIAG INJ SC/IM: CPT

## 2025-03-13 PROCEDURE — 86850 RBC ANTIBODY SCREEN: CPT

## 2025-03-13 PROCEDURE — 93306 TTE W/DOPPLER COMPLETE: CPT

## 2025-03-13 PROCEDURE — G0378 HOSPITAL OBSERVATION PER HR: HCPCS

## 2025-03-13 PROCEDURE — 36415 COLL VENOUS BLD VENIPUNCTURE: CPT

## 2025-03-13 PROCEDURE — 96375 TX/PRO/DX INJ NEW DRUG ADDON: CPT

## 2025-03-13 PROCEDURE — 86923 COMPATIBILITY TEST ELECTRIC: CPT

## 2025-03-13 PROCEDURE — 6360000004 HC RX CONTRAST MEDICATION: Performed by: STUDENT IN AN ORGANIZED HEALTH CARE EDUCATION/TRAINING PROGRAM

## 2025-03-13 PROCEDURE — 87641 MR-STAPH DNA AMP PROBE: CPT

## 2025-03-13 PROCEDURE — 2000000000 HC ICU R&B

## 2025-03-13 PROCEDURE — 86140 C-REACTIVE PROTEIN: CPT

## 2025-03-13 PROCEDURE — 86901 BLOOD TYPING SEROLOGIC RH(D): CPT

## 2025-03-13 PROCEDURE — 84145 PROCALCITONIN (PCT): CPT

## 2025-03-13 PROCEDURE — 85730 THROMBOPLASTIN TIME PARTIAL: CPT

## 2025-03-13 PROCEDURE — 93005 ELECTROCARDIOGRAM TRACING: CPT | Performed by: STUDENT IN AN ORGANIZED HEALTH CARE EDUCATION/TRAINING PROGRAM

## 2025-03-13 PROCEDURE — 85025 COMPLETE CBC W/AUTO DIFF WBC: CPT

## 2025-03-13 RX ORDER — METOPROLOL TARTRATE 1 MG/ML
5 INJECTION, SOLUTION INTRAVENOUS EVERY 6 HOURS PRN
Status: DISCONTINUED | OUTPATIENT
Start: 2025-03-13 | End: 2025-04-03 | Stop reason: HOSPADM

## 2025-03-13 RX ORDER — VALSARTAN 80 MG/1
80 TABLET ORAL DAILY
Status: DISCONTINUED | OUTPATIENT
Start: 2025-03-13 | End: 2025-03-13 | Stop reason: HOSPADM

## 2025-03-13 RX ORDER — POLYETHYLENE GLYCOL 3350 17 G/17G
17 POWDER, FOR SOLUTION ORAL DAILY PRN
Status: DISCONTINUED | OUTPATIENT
Start: 2025-03-13 | End: 2025-04-03 | Stop reason: HOSPADM

## 2025-03-13 RX ORDER — ENOXAPARIN SODIUM 100 MG/ML
40 INJECTION SUBCUTANEOUS DAILY
Status: DISCONTINUED | OUTPATIENT
Start: 2025-03-13 | End: 2025-03-24

## 2025-03-13 RX ORDER — SODIUM CHLORIDE, SODIUM LACTATE, POTASSIUM CHLORIDE, CALCIUM CHLORIDE 600; 310; 30; 20 MG/100ML; MG/100ML; MG/100ML; MG/100ML
INJECTION, SOLUTION INTRAVENOUS CONTINUOUS
Status: DISCONTINUED | OUTPATIENT
Start: 2025-03-13 | End: 2025-03-16

## 2025-03-13 RX ORDER — ONDANSETRON 2 MG/ML
4 INJECTION INTRAMUSCULAR; INTRAVENOUS EVERY 6 HOURS PRN
Status: DISCONTINUED | OUTPATIENT
Start: 2025-03-13 | End: 2025-04-03 | Stop reason: HOSPADM

## 2025-03-13 RX ORDER — METOPROLOL TARTRATE 25 MG/1
12.5 TABLET, FILM COATED ORAL 2 TIMES DAILY
Status: DISCONTINUED | OUTPATIENT
Start: 2025-03-13 | End: 2025-03-17

## 2025-03-13 RX ORDER — HYDROMORPHONE HYDROCHLORIDE 1 MG/ML
0.5 INJECTION, SOLUTION INTRAMUSCULAR; INTRAVENOUS; SUBCUTANEOUS
Status: DISPENSED | OUTPATIENT
Start: 2025-03-13 | End: 2025-03-16

## 2025-03-13 RX ORDER — SODIUM CHLORIDE 0.9 % (FLUSH) 0.9 %
5-40 SYRINGE (ML) INJECTION PRN
Status: DISCONTINUED | OUTPATIENT
Start: 2025-03-13 | End: 2025-04-03 | Stop reason: HOSPADM

## 2025-03-13 RX ORDER — METOPROLOL SUCCINATE 50 MG/1
50 TABLET, EXTENDED RELEASE ORAL DAILY
Status: DISCONTINUED | OUTPATIENT
Start: 2025-03-13 | End: 2025-03-13 | Stop reason: HOSPADM

## 2025-03-13 RX ORDER — ENOXAPARIN SODIUM 100 MG/ML
1 INJECTION SUBCUTANEOUS 2 TIMES DAILY
Status: DISCONTINUED | OUTPATIENT
Start: 2025-03-13 | End: 2025-03-13

## 2025-03-13 RX ORDER — HYDROMORPHONE HYDROCHLORIDE 1 MG/ML
1 INJECTION, SOLUTION INTRAMUSCULAR; INTRAVENOUS; SUBCUTANEOUS
Status: DISPENSED | OUTPATIENT
Start: 2025-03-13 | End: 2025-03-16

## 2025-03-13 RX ORDER — FENTANYL CITRATE 50 UG/ML
25 INJECTION, SOLUTION INTRAMUSCULAR; INTRAVENOUS EVERY 4 HOURS PRN
Refills: 0 | Status: DISCONTINUED | OUTPATIENT
Start: 2025-03-13 | End: 2025-03-13

## 2025-03-13 RX ORDER — SODIUM CHLORIDE, SODIUM LACTATE, POTASSIUM CHLORIDE, CALCIUM CHLORIDE 600; 310; 30; 20 MG/100ML; MG/100ML; MG/100ML; MG/100ML
INJECTION, SOLUTION INTRAVENOUS CONTINUOUS
Status: DISCONTINUED | OUTPATIENT
Start: 2025-03-13 | End: 2025-03-13 | Stop reason: HOSPADM

## 2025-03-13 RX ORDER — PROCHLORPERAZINE EDISYLATE 5 MG/ML
5 INJECTION INTRAMUSCULAR; INTRAVENOUS EVERY 6 HOURS PRN
Status: DISCONTINUED | OUTPATIENT
Start: 2025-03-13 | End: 2025-03-13 | Stop reason: HOSPADM

## 2025-03-13 RX ORDER — SODIUM CHLORIDE 0.9 % (FLUSH) 0.9 %
5-40 SYRINGE (ML) INJECTION EVERY 12 HOURS SCHEDULED
Status: DISCONTINUED | OUTPATIENT
Start: 2025-03-13 | End: 2025-04-03 | Stop reason: HOSPADM

## 2025-03-13 RX ORDER — IOPAMIDOL 755 MG/ML
100 INJECTION, SOLUTION INTRAVASCULAR
Status: COMPLETED | OUTPATIENT
Start: 2025-03-13 | End: 2025-03-13

## 2025-03-13 RX ORDER — ONDANSETRON 4 MG/1
4 TABLET, ORALLY DISINTEGRATING ORAL EVERY 8 HOURS PRN
Status: DISCONTINUED | OUTPATIENT
Start: 2025-03-13 | End: 2025-04-03 | Stop reason: HOSPADM

## 2025-03-13 RX ORDER — ASPIRIN 81 MG/1
81 TABLET ORAL DAILY
Status: DISCONTINUED | OUTPATIENT
Start: 2025-03-13 | End: 2025-03-21

## 2025-03-13 RX ORDER — HYDRALAZINE HYDROCHLORIDE 20 MG/ML
10 INJECTION INTRAMUSCULAR; INTRAVENOUS EVERY 6 HOURS PRN
Status: DISCONTINUED | OUTPATIENT
Start: 2025-03-13 | End: 2025-04-03 | Stop reason: HOSPADM

## 2025-03-13 RX ORDER — SODIUM CHLORIDE 9 MG/ML
INJECTION, SOLUTION INTRAVENOUS PRN
Status: DISCONTINUED | OUTPATIENT
Start: 2025-03-13 | End: 2025-04-03 | Stop reason: HOSPADM

## 2025-03-13 RX ORDER — ALBUTEROL SULFATE 5 MG/ML
2.5 SOLUTION RESPIRATORY (INHALATION)
Status: DISCONTINUED | OUTPATIENT
Start: 2025-03-13 | End: 2025-04-03 | Stop reason: HOSPADM

## 2025-03-13 RX ORDER — CARVEDILOL 3.12 MG/1
6.25 TABLET ORAL 2 TIMES DAILY WITH MEALS
Status: DISCONTINUED | OUTPATIENT
Start: 2025-03-13 | End: 2025-03-13

## 2025-03-13 RX ORDER — PANTOPRAZOLE SODIUM 40 MG/10ML
40 INJECTION, POWDER, LYOPHILIZED, FOR SOLUTION INTRAVENOUS 2 TIMES DAILY
Status: DISCONTINUED | OUTPATIENT
Start: 2025-03-13 | End: 2025-03-16

## 2025-03-13 RX ORDER — LORAZEPAM 2 MG/ML
0.5 INJECTION INTRAMUSCULAR EVERY 6 HOURS PRN
Status: DISCONTINUED | OUTPATIENT
Start: 2025-03-13 | End: 2025-03-13 | Stop reason: HOSPADM

## 2025-03-13 RX ADMIN — PANTOPRAZOLE SODIUM 40 MG: 40 INJECTION, POWDER, FOR SOLUTION INTRAVENOUS at 20:43

## 2025-03-13 RX ADMIN — SODIUM CHLORIDE, PRESERVATIVE FREE 10 ML: 5 INJECTION INTRAVENOUS at 01:21

## 2025-03-13 RX ADMIN — SODIUM CHLORIDE, PRESERVATIVE FREE 10 ML: 5 INJECTION INTRAVENOUS at 20:45

## 2025-03-13 RX ADMIN — HYDROMORPHONE HYDROCHLORIDE 1 MG: 1 INJECTION, SOLUTION INTRAMUSCULAR; INTRAVENOUS; SUBCUTANEOUS at 18:58

## 2025-03-13 RX ADMIN — SODIUM CHLORIDE 40 MG: 9 INJECTION, SOLUTION INTRAMUSCULAR; INTRAVENOUS; SUBCUTANEOUS at 11:51

## 2025-03-13 RX ADMIN — SODIUM CHLORIDE, PRESERVATIVE FREE 10 ML: 5 INJECTION INTRAVENOUS at 09:18

## 2025-03-13 RX ADMIN — ATORVASTATIN CALCIUM 40 MG: 40 TABLET, FILM COATED ORAL at 01:21

## 2025-03-13 RX ADMIN — SODIUM CHLORIDE, PRESERVATIVE FREE 10 ML: 5 INJECTION INTRAVENOUS at 01:22

## 2025-03-13 RX ADMIN — SODIUM CHLORIDE, POTASSIUM CHLORIDE, SODIUM LACTATE AND CALCIUM CHLORIDE: 600; 310; 30; 20 INJECTION, SOLUTION INTRAVENOUS at 20:36

## 2025-03-13 RX ADMIN — ENOXAPARIN SODIUM 80 MG: 100 INJECTION SUBCUTANEOUS at 03:34

## 2025-03-13 RX ADMIN — MORPHINE SULFATE 4 MG: 4 INJECTION, SOLUTION INTRAMUSCULAR; INTRAVENOUS at 11:58

## 2025-03-13 RX ADMIN — ONDANSETRON 4 MG: 2 INJECTION, SOLUTION INTRAMUSCULAR; INTRAVENOUS at 09:18

## 2025-03-13 RX ADMIN — ONDANSETRON 4 MG: 2 INJECTION INTRAMUSCULAR; INTRAVENOUS at 20:01

## 2025-03-13 RX ADMIN — IOPAMIDOL 100 ML: 755 INJECTION, SOLUTION INTRAVENOUS at 20:23

## 2025-03-13 RX ADMIN — SODIUM CHLORIDE, POTASSIUM CHLORIDE, SODIUM LACTATE AND CALCIUM CHLORIDE: 600; 310; 30; 20 INJECTION, SOLUTION INTRAVENOUS at 14:24

## 2025-03-13 RX ADMIN — LISINOPRIL 40 MG: 20 TABLET ORAL at 10:01

## 2025-03-13 RX ADMIN — PROCHLORPERAZINE EDISYLATE 5 MG: 5 INJECTION INTRAMUSCULAR; INTRAVENOUS at 11:54

## 2025-03-13 RX ADMIN — HYDROCHLOROTHIAZIDE 12.5 MG: 25 TABLET ORAL at 10:01

## 2025-03-13 RX ADMIN — METOPROLOL TARTRATE 5 MG: 5 INJECTION INTRAVENOUS at 17:51

## 2025-03-13 RX ADMIN — OCTREOTIDE ACETATE 25 MCG/HR: 100 INJECTION, SOLUTION INTRAVENOUS; SUBCUTANEOUS at 12:03

## 2025-03-13 RX ADMIN — SODIUM CHLORIDE, SODIUM LACTATE, POTASSIUM CHLORIDE, AND CALCIUM CHLORIDE: .6; .31; .03; .02 INJECTION, SOLUTION INTRAVENOUS at 11:59

## 2025-03-13 RX ADMIN — SODIUM CHLORIDE 40 MG: 9 INJECTION, SOLUTION INTRAMUSCULAR; INTRAVENOUS; SUBCUTANEOUS at 01:21

## 2025-03-13 RX ADMIN — ASPIRIN 81 MG: 81 TABLET, CHEWABLE ORAL at 10:02

## 2025-03-13 RX ADMIN — PANTOPRAZOLE SODIUM 40 MG: 40 INJECTION, POWDER, FOR SOLUTION INTRAVENOUS at 14:58

## 2025-03-13 RX ADMIN — FENTANYL CITRATE 25 MCG: 50 INJECTION, SOLUTION INTRAMUSCULAR; INTRAVENOUS at 18:01

## 2025-03-13 ASSESSMENT — PAIN SCALES - GENERAL
PAINLEVEL_OUTOF10: 3
PAINLEVEL_OUTOF10: 9
PAINLEVEL_OUTOF10: 4
PAINLEVEL_OUTOF10: 9
PAINLEVEL_OUTOF10: 10
PAINLEVEL_OUTOF10: 3

## 2025-03-13 ASSESSMENT — PAIN DESCRIPTION - DESCRIPTORS: DESCRIPTORS: ACHING;THROBBING

## 2025-03-13 ASSESSMENT — PAIN DESCRIPTION - ORIENTATION: ORIENTATION: UPPER

## 2025-03-13 ASSESSMENT — PAIN DESCRIPTION - LOCATION
LOCATION: ABDOMEN
LOCATION: ABDOMEN

## 2025-03-13 NOTE — CONSULTS
CARDIOLOGY CONSULTATION    REASON FOR CONSULT: NSTEMI    REQUESTING PROVIDER: Dr. Meredith    CHIEF COMPLAINT:  chest pain    HISTORY OF PRESENT ILLNESS:  Lei Jaimes is a 79 y.o. year-old male with past medical history significant for HTN, CKD, GERD, esophageal stricture and dysphagia who was evaluated today due to elevated troponin and chest pain. He initially presented to the ED after episode of chest pain and diaphoresis. Initial trop 13--peaked at 126 now 101. Multiple episodes of hematemesis just prior to exam and I was advised that patient will be transferred to [facility] for further evaluation and higher level of care. EKG reviewed, no acute ischemic changes. Suspect that elevated troponin is secondary to SARA and he is also markedly hypertensive. Once stabilized and bleeding has resolved, he should proceed with ischemic evaluation/NST. Discussed with spouse and patient at bedside, plan for transfer, pending course further evaluation with nuclear stress test and close follow up in the outpatient setting. They are agreeable.    Records from hospital admission course thus far reviewed.      Telemetry reviewed.  No events overnight.  .    INPATIENT MEDICATIONS:  Home medications reviewed.  No current facility-administered medications for this encounter.  No current outpatient medications on file.    Facility-Administered Medications Ordered in Other Encounters:     sodium chloride flush 0.9 % injection 5-40 mL, 5-40 mL, IntraVENous, 2 times per day, Terrie Hopper MD    sodium chloride flush 0.9 % injection 5-40 mL, 5-40 mL, IntraVENous, PRN, Terrie Hopper MD    0.9 % sodium chloride infusion, , IntraVENous, PRN, Terrie Hopper MD    ondansetron (ZOFRAN-ODT) disintegrating tablet 4 mg, 4 mg, Oral, Q8H PRN **OR** ondansetron (ZOFRAN) injection 4 mg, 4 mg, IntraVENous, Q6H PRN, Terrie Hopper MD    polyethylene glycol (GLYCOLAX) packet 17 g, 17 g, Oral, Daily PRN, Terrie Hopper MD

## 2025-03-13 NOTE — H&P
V2.0  History and Physical      Name:  Lei Jaimes /Age/Sex: 1945  (79 y.o. male)   MRN & CSN:  [id number] & [id number] Encounter Date/Time: 3/12/25/ 2230 EDT   Location:   PCP: Akash Contreras MD       Hospital Day: 2    Assessment and Plan:   Lei Jaimes is a 79 y.o. male with a pmh as below who presents with NSTEMI (non-ST elevated myocardial infarction) ([facility])    Hospital Problems           Last Modified POA    * (Principal) NSTEMI (non-ST elevated myocardial infarction) (HCC) 3/13/2025 Yes    Acute kidney injury superimposed on stage 3b chronic kidney disease (HCC) 3/13/2025 Yes    HTN (hypertension) 3/13/2025 Yes    BPH (benign prostatic hyperplasia) 3/13/2025 Yes    Esophageal dysphagia 3/13/2025 Yes    Acute cystitis without hematuria 3/13/2025 Yes       Plan:  NSTEMI (non-ST elevated myocardial infarction) ([facility])  Non ST-elevation myocardial infarction with ANDREA risk score of 2  Came in with chest pain after choking incident, causing distress in patient -> demand based?  Troponin 13 -> 129  ASA, therapeutic lovenox and statin. Consider BB  Hold home ACEi as below  SL Nitro  Check lipid panel, A1C, and 2D echo  Consulted cardiology, appreciate recommendations    Acute kidney injury superimposed on stage 3b chronic kidney disease (HCC)  Initial labs at baseline but when called for troponin result reviewed am labs and noted new SARA  Cr 1.66 in ED w baseline ~1.2 per EMR  Likely from contrast in ED IV contrast  IVF  Follow Cr with daily labs and monitor I & O  Hold home ACEi  Try and hold further nephrotoxic agents as able    HTN (hypertension)  Uncontrolled likely due to choking incident   Holding ACEi as above  PRN    BPH (benign prostatic hyperplasia)  History of and noted on CT  Consider flomax  Without urinary symptoms    Esophageal dysphagia  Caused his choking incident  Follows with GI for dilatations  Follow up recommended    Acute cystitis without hematuria   ED started Levaquin

## 2025-03-13 NOTE — PROGRESS NOTES
Patient had now EKG completed but than vomited x 4 , palm size amount bright red blood.  Dr. Meredith aware.

## 2025-03-13 NOTE — ASSESSMENT & PLAN NOTE
ED started Levaquin for acute cystitis?  UA unremarkable and without symptoms  CT: Marked prostamegaly with mild cystitis   Given zero symptoms we will hold abx

## 2025-03-13 NOTE — ED NOTES
Pt was provided with snacks and a ginger ale. After first few bites pt stated the his abd started hurting again and pt began to vomit blood streaked gastric contents.     Verbal order received from ED doc for zofran.

## 2025-03-13 NOTE — PROGRESS NOTES
Report called to ER staff Nargis Rivera RN due patient is awaiting bed at [facility] and requires monitoring in ER.  Patient alert and oriented x 4 wife at side. Vs   98.5- 122-/57 100 % room air.  Patient still c/o sternal throat discomfort.

## 2025-03-13 NOTE — PROGRESS NOTES
Patient transferred to [facility] from [facility] ER per [facility].

## 2025-03-13 NOTE — ED NOTES
Received report from Paulette RN- pt brought down to ACU via hospital bed with Tech. Alert and oriented x4. C/o nausea- no vomiting blood at this time. Sinus tach 120. RR even and nonlabored. Wife at bedside. Pt and wife made aware hospitalist is working on transferto [facility]

## 2025-03-13 NOTE — ASSESSMENT & PLAN NOTE
Non ST-elevation myocardial infarction with ANDREA risk score of 2  Came in with chest pain after choking incident, causing distress in patient -> demand based?  Troponin 13 -> 129  ASA, therapeutic lovenox and statin. Consider BB  Hold home ACEi as below  SL Nitro  Check lipid panel, A1C, and 2D echo  Consulted cardiology, appreciate recommendations

## 2025-03-13 NOTE — PROGRESS NOTES
Into room per bed alert and oriented x 4. Assisted to bed. [place] to room set up. Attended to needs. Seen by on call. Call bell at reach. Promoted rest.

## 2025-03-13 NOTE — PROGRESS NOTES
Patient seen by me urgently around 10:30 AM, nursing reports patient vomiting BRB repeatedly.  Patient awake alert, reports epigastric pain and nausea, I observed some mucous with BRB streaks, and there was other BRB on several gowns approx 250cc by my estimation.  Patient's vitals were stable at the time, generous blood pressure on room air.    Patient did receive subcu Lovenox 80 this morning around 4 AM and received aspirin.  He also received IV Protonix. This am, ordered 40mg IV BID. Will also start octreotide - no known varices, however we cannot be certain of this and risk vs benefit favors giving octreotide.     Make n.p.o., type and screen.  I messaged our GI physician here right away and I am awaiting a callback.  I called the transfer line as well to request transfer and await a callback.  Reviewed recent CT scan, no suggestion of varices.    Spoke with cardiology, his echo today does show new regional wall motion abnormalities and troponin had been rising, concern for recent or ongoing infarction.    Patient needs transferred out ASAP to higher level of care for urgent GI evaluation for upper GI bleeding, in the setting of suspected ACS as well.      11:20am - staff note pt just vomited BRB again 3x.  CBC type and screen pending.   To move to ED as functional ICU bed. I spoke to ED doc to alert her of the situation.     Called transfer line back at 11:25 to request ICU STAT, they will check w  and [facility] for ICU beds, then try [facility]. Clarified he would need GI and Cardiology w interventional.     11:32 am - transfer line called,  ICU beds available   Spoke to Dr. Terrie Hopper, accepting to ICU there.   He is hemodynamically stable at this time fortunately, however w UGIB at high risk of rapid decompensation and I request transport by air.    Pt down in ER at this time.    Labs pending.     Transfer line has also paged GI at , and I will speak to them to alert them of pt arriving.     11:45

## 2025-03-13 NOTE — DISCHARGE SUMMARY
Physician Discharge Summary       Patient: Lei Jaimes MRN: [id number]     YOB: 1945  Age: 79 y.o.  Sex: male    PCP: Akash Contreras MD    Allergies: Patient has no known allergies.    Admit date: 3/12/2025  Admitting Provider: Abdiel Peace DO    Discharge date: 3/13/2025  Discharging Provider: Adriane Meredith MD    * Admission Diagnoses:   Acute cystitis without hematuria [N30.00]      * Discharge Diagnoses:    Active Hospital Problems    NSTEMI (non-ST elevated myocardial infarction) (HCC)      Acute kidney injury superimposed on stage 3b chronic kidney disease (HCC)      Acute cystitis without hematuria      Esophageal dysphagia      HTN (hypertension)      BPH (benign prostatic hyperplasia)         Chief Complaint   Patient presents with    Abdominal Pain    Chest Pain        Presentation on 3/12/2025  HPI as per admitting provider on 3/12/2025      History of Present Illness:   Chief Complaint: Choking  Lei Jaimes is a 79 y.o. male with pmh as below who presents with choking this am. He states he was drinking his coffee and eating a Turkmen when it got stoke. He states he panicked, became anxious, started coughing then threw up. He states he has a history of strictures and is followed by GI for dilatations. He states he is now having right sided rib pain that does not radiate, is associated with diaphoresis, fatigue, emesis, and obvious SOB from choking. He states he continues to throw up and had some small blood after his very hard throw up, which has not happened since. He notes he has upper abd pain that is relieved by belching. He denies any fevers, chills, dysuria, hematuria, or frequency. He notes history of BPH but is not having any LUTS at this time. Otherwise he is resting comfortable and denies any additional complaints at this time.      * Hospital Course:     Patient was initially admitted as described above with abdominal discomfort,

## 2025-03-13 NOTE — ASSESSMENT & PLAN NOTE
Initial labs at baseline but when called for troponin result reviewed am labs and noted new SARA  Cr 1.66 in ED w baseline ~1.2 per EMR  Likely from contrast in ED IV contrast  IVF  Follow Cr with daily labs and monitor I & O  Hold home ACEi  Try and hold further nephrotoxic agents as able

## 2025-03-13 NOTE — ED NOTES
Pt transferred from ACU to ED. Pt hooked up to full monitoring and provided with warm blanket. No further needs at this time.

## 2025-03-13 NOTE — H&P
CRITICAL CARE ADMISSION NOTE    Name: Lei Jaimes   : 1945   MRN: [id number]   Date: 3/13/2025      Diagnoses/problem list:   NSTEMI  SARA on CKD stage 3b  HTN  BPH  Esophageal stenosis s/p multiple dilations  Acute cystitis without hematuria  Gastrointestinal bleed  Possible PAD    HPI   Lei Jaimes is a 79 y.o. male with pmh as below who presents with choking this am. He states he was drinking his coffee and eating a Serbian when he started coughing and felt as thought the food was stuck mid esophagus. He states he panicked, became anxious, started coughing then threw up. He states he has a history of strictures and is followed by GI for dilatations. He states he is now having right sided rib pain that does not radiate, is associated with diaphoresis, fatigue, emesis, and obvious SOB from choking. He states he continues to throw up and had some small blood after his very hard throw up, which has not happened since. He notes he has upper abd pain that is relieved by belching. He denies any fevers, chills, dysuria, hematuria, or frequency. He notes history of BPH but is not having any LUTS at this time.     This am he was noted to have increased hematemesis and the transfer request for GI and cardiology evaluation was made. He currently arrived on octreotide and LR with stable hemodynamics. He denies any pain currently.     ROS negative except as otherwise documented.    Assessment and plan:     NEUROLOGICAL:    Alert, oriented  -no acute issues    PULMONOLOGY:   No acute pulmonary concerns  - maintain SpO2>=92, pH>=7.30  - follow ABG and CXR    CARDIOVASCULAR:   NSTEMI  Hypertension  -Goal MAP>=65  -Trend troponin / lactate  -TTE with LVEF 50-55%, mild AI, mildly increased thickness, no regional wall motion abnormalities   -EKG PRN  -cardiology consulted  -metoprolol and hydralazine IV PRN until can resume PO home meds    GASTROINTESTINAL:   GIB, possible Olivia Benson tear  Nutrition: NPO  History of

## 2025-03-13 NOTE — PROGRESS NOTES
Patient c/o  pain in sternal area stating \"it felt like his throat\"- EKG performed and Dr. Meredith notified.

## 2025-03-14 ENCOUNTER — ANESTHESIA (OUTPATIENT)
Facility: HOSPITAL | Age: 80
End: 2025-03-14
Payer: MEDICARE

## 2025-03-14 ENCOUNTER — ANESTHESIA EVENT (OUTPATIENT)
Facility: HOSPITAL | Age: 80
End: 2025-03-14
Payer: MEDICARE

## 2025-03-14 LAB
ALBUMIN SERPL-MCNC: 2.6 G/DL (ref 3.5–5)
ALBUMIN/GLOB SERPL: 0.8 (ref 1.1–2.2)
ALP SERPL-CCNC: 36 U/L (ref 45–117)
ALT SERPL-CCNC: 11 U/L (ref 12–78)
ANION GAP SERPL CALC-SCNC: 6 MMOL/L (ref 2–12)
AST SERPL W P-5'-P-CCNC: 11 U/L (ref 15–37)
BILIRUB DIRECT SERPL-MCNC: 0.2 MG/DL (ref 0–0.2)
BILIRUB SERPL-MCNC: 0.5 MG/DL (ref 0.2–1)
BUN SERPL-MCNC: 70 MG/DL (ref 6–20)
BUN/CREAT SERPL: 27 (ref 12–20)
CA-I BLD-MCNC: 8.7 MG/DL (ref 8.5–10.1)
CHLORIDE SERPL-SCNC: 113 MMOL/L (ref 97–108)
CO2 SERPL-SCNC: 25 MMOL/L (ref 21–32)
CREAT SERPL-MCNC: 2.59 MG/DL (ref 0.7–1.3)
EKG ATRIAL RATE: 98 BPM
EKG DIAGNOSIS: NORMAL
EKG P AXIS: 74 DEGREES
EKG P-R INTERVAL: 174 MS
EKG Q-T INTERVAL: 346 MS
EKG QRS DURATION: 90 MS
EKG QTC CALCULATION (BAZETT): 441 MS
EKG R AXIS: 67 DEGREES
EKG T AXIS: 91 DEGREES
EKG VENTRICULAR RATE: 98 BPM
ERYTHROCYTE [DISTWIDTH] IN BLOOD BY AUTOMATED COUNT: 12.6 % (ref 11.5–14.5)
GLOBULIN SER CALC-MCNC: 3.1 G/DL (ref 2–4)
GLUCOSE SERPL-MCNC: 126 MG/DL (ref 65–100)
HCT VFR BLD AUTO: 23.1 % (ref 36.6–50.3)
HGB BLD-MCNC: 7.3 G/DL (ref 12.1–17)
MAGNESIUM SERPL-MCNC: 1.8 MG/DL (ref 1.6–2.4)
MCH RBC QN AUTO: 32.3 PG (ref 26–34)
MCHC RBC AUTO-ENTMCNC: 31.6 G/DL (ref 30–36.5)
MCV RBC AUTO: 102.2 FL (ref 80–99)
NRBC # BLD: 0 K/UL (ref 0–0.01)
NRBC BLD-RTO: 0 PER 100 WBC
PHOSPHATE SERPL-MCNC: 4.3 MG/DL (ref 2.6–4.7)
PLATELET # BLD AUTO: 128 K/UL (ref 150–400)
PMV BLD AUTO: 12.3 FL (ref 8.9–12.9)
POTASSIUM SERPL-SCNC: 4.6 MMOL/L (ref 3.5–5.1)
PROT SERPL-MCNC: 5.7 G/DL (ref 6.4–8.2)
RBC # BLD AUTO: 2.26 M/UL (ref 4.1–5.7)
SODIUM SERPL-SCNC: 144 MMOL/L (ref 136–145)
TROPONIN I SERPL HS-MCNC: 76 NG/L (ref 0–76)
WBC # BLD AUTO: 18 K/UL (ref 4.1–11.1)

## 2025-03-14 PROCEDURE — 3700000001 HC ADD 15 MINUTES (ANESTHESIA): Performed by: INTERNAL MEDICINE

## 2025-03-14 PROCEDURE — 3700000000 HC ANESTHESIA ATTENDED CARE: Performed by: INTERNAL MEDICINE

## 2025-03-14 PROCEDURE — 6360000002 HC RX W HCPCS: Performed by: STUDENT IN AN ORGANIZED HEALTH CARE EDUCATION/TRAINING PROGRAM

## 2025-03-14 PROCEDURE — 2709999900 HC NON-CHARGEABLE SUPPLY: Performed by: INTERNAL MEDICINE

## 2025-03-14 PROCEDURE — 84484 ASSAY OF TROPONIN QUANT: CPT

## 2025-03-14 PROCEDURE — 6360000002 HC RX W HCPCS: Performed by: INTERNAL MEDICINE

## 2025-03-14 PROCEDURE — 6360000002 HC RX W HCPCS: Performed by: NURSE ANESTHETIST, CERTIFIED REGISTERED

## 2025-03-14 PROCEDURE — 80076 HEPATIC FUNCTION PANEL: CPT

## 2025-03-14 PROCEDURE — 3E0G8GC INTRODUCTION OF OTHER THERAPEUTIC SUBSTANCE INTO UPPER GI, VIA NATURAL OR ARTIFICIAL OPENING ENDOSCOPIC: ICD-10-PCS | Performed by: INTERNAL MEDICINE

## 2025-03-14 PROCEDURE — 84100 ASSAY OF PHOSPHORUS: CPT

## 2025-03-14 PROCEDURE — 83735 ASSAY OF MAGNESIUM: CPT

## 2025-03-14 PROCEDURE — 3600007511: Performed by: INTERNAL MEDICINE

## 2025-03-14 PROCEDURE — 2500000003 HC RX 250 WO HCPCS: Performed by: STUDENT IN AN ORGANIZED HEALTH CARE EDUCATION/TRAINING PROGRAM

## 2025-03-14 PROCEDURE — 85027 COMPLETE CBC AUTOMATED: CPT

## 2025-03-14 PROCEDURE — 3600007501: Performed by: INTERNAL MEDICINE

## 2025-03-14 PROCEDURE — 2580000003 HC RX 258: Performed by: STUDENT IN AN ORGANIZED HEALTH CARE EDUCATION/TRAINING PROGRAM

## 2025-03-14 PROCEDURE — 94761 N-INVAS EAR/PLS OXIMETRY MLT: CPT

## 2025-03-14 PROCEDURE — 93005 ELECTROCARDIOGRAM TRACING: CPT | Performed by: INTERNAL MEDICINE

## 2025-03-14 PROCEDURE — 2000000000 HC ICU R&B

## 2025-03-14 PROCEDURE — 36415 COLL VENOUS BLD VENIPUNCTURE: CPT

## 2025-03-14 PROCEDURE — 80048 BASIC METABOLIC PNL TOTAL CA: CPT

## 2025-03-14 RX ORDER — EPINEPHRINE 0.1 MG/ML
INJECTION INTRAVENOUS PRN
Status: DISCONTINUED | OUTPATIENT
Start: 2025-03-14 | End: 2025-03-14 | Stop reason: ALTCHOICE

## 2025-03-14 RX ORDER — PHENYLEPHRINE HCL IN 0.9% NACL 0.4MG/10ML
SYRINGE (ML) INTRAVENOUS
Status: DISCONTINUED | OUTPATIENT
Start: 2025-03-14 | End: 2025-03-14 | Stop reason: SDUPTHER

## 2025-03-14 RX ADMIN — PROPOFOL 120 MG: 10 INJECTION, EMULSION INTRAVENOUS at 15:29

## 2025-03-14 RX ADMIN — SODIUM CHLORIDE, PRESERVATIVE FREE 10 ML: 5 INJECTION INTRAVENOUS at 20:22

## 2025-03-14 RX ADMIN — LIDOCAINE HYDROCHLORIDE 80 MG: 20 INJECTION, SOLUTION EPIDURAL; INFILTRATION; INTRACAUDAL; PERINEURAL at 15:28

## 2025-03-14 RX ADMIN — Medication 300 MCG: at 15:33

## 2025-03-14 RX ADMIN — Medication 200 MCG: at 15:30

## 2025-03-14 RX ADMIN — PROPOFOL 30 MG: 10 INJECTION, EMULSION INTRAVENOUS at 15:32

## 2025-03-14 RX ADMIN — SODIUM CHLORIDE, PRESERVATIVE FREE 10 ML: 5 INJECTION INTRAVENOUS at 09:23

## 2025-03-14 RX ADMIN — SODIUM CHLORIDE, POTASSIUM CHLORIDE, SODIUM LACTATE AND CALCIUM CHLORIDE: 600; 310; 30; 20 INJECTION, SOLUTION INTRAVENOUS at 19:29

## 2025-03-14 RX ADMIN — SODIUM CHLORIDE, POTASSIUM CHLORIDE, SODIUM LACTATE AND CALCIUM CHLORIDE: 600; 310; 30; 20 INJECTION, SOLUTION INTRAVENOUS at 03:57

## 2025-03-14 RX ADMIN — HYDROMORPHONE HYDROCHLORIDE 0.5 MG: 1 INJECTION, SOLUTION INTRAMUSCULAR; INTRAVENOUS; SUBCUTANEOUS at 20:15

## 2025-03-14 RX ADMIN — PANTOPRAZOLE SODIUM 40 MG: 40 INJECTION, POWDER, FOR SOLUTION INTRAVENOUS at 09:19

## 2025-03-14 RX ADMIN — PANTOPRAZOLE SODIUM 40 MG: 40 INJECTION, POWDER, FOR SOLUTION INTRAVENOUS at 20:15

## 2025-03-14 RX ADMIN — SODIUM CHLORIDE, POTASSIUM CHLORIDE, SODIUM LACTATE AND CALCIUM CHLORIDE: 600; 310; 30; 20 INJECTION, SOLUTION INTRAVENOUS at 12:08

## 2025-03-14 ASSESSMENT — PAIN SCALES - GENERAL
PAINLEVEL_OUTOF10: 0
PAINLEVEL_OUTOF10: 3
PAINLEVEL_OUTOF10: 0
PAINLEVEL_OUTOF10: 2
PAINLEVEL_OUTOF10: 2
PAINLEVEL_OUTOF10: 5
PAINLEVEL_OUTOF10: 4

## 2025-03-14 ASSESSMENT — PAIN DESCRIPTION - LOCATION
LOCATION: ABDOMEN
LOCATION: ABDOMEN
LOCATION: ABDOMEN;THROAT
LOCATION: ABDOMEN

## 2025-03-14 ASSESSMENT — PAIN DESCRIPTION - ORIENTATION
ORIENTATION: UPPER
ORIENTATION: UPPER

## 2025-03-14 ASSESSMENT — PAIN DESCRIPTION - DESCRIPTORS
DESCRIPTORS: ACHING
DESCRIPTORS: ACHING

## 2025-03-14 ASSESSMENT — PAIN - FUNCTIONAL ASSESSMENT: PAIN_FUNCTIONAL_ASSESSMENT: ACTIVITIES ARE NOT PREVENTED

## 2025-03-14 NOTE — CARE COORDINATION
03/14/25 1318   Service Assessment   Patient Orientation Alert and Oriented   Cognition Alert   History Provided By Spouse   Primary Caregiver Self   Support Systems Spouse/Significant Other;Children;Family Members;Friends/Neighbors   Patient's Healthcare Decision Maker is: Legal Next of Kin   PCP Verified by CM Yes   Last Visit to PCP Within last 3 months   Prior Functional Level Independent in ADLs/IADLs   Current Functional Level Independent in ADLs/IADLs   Can patient return to prior living arrangement Yes   Ability to make needs known: Good   Family able to assist with home care needs: Yes   Would you like for me to discuss the discharge plan with any other family members/significant others, and if so, who? Yes   Financial Resources None   Community Resources None   Social/Functional History   Lives With Spouse   Services At/After Discharge   Mode of Transport at Discharge Other (see comment)   Confirm Follow Up Transport Family     CM met f/f with Pt and  his wife, Pt wife confirmed that the information on the face sheet is correct.  Pt wife stated that it is just the two of them that live in the home.    No HH, no DME and independent with ADL    [facility] [place].    Family will transport Pt home when D/C.    HASMUKH dispo: TBD    Advance Care Planning     General Advance Care Planning (ACP) Conversation    Date of Conversation: 3/14/2025      Healthcare Decision Maker: No healthcare decision makers have been documented.       Content/Action Overview:    Reviewed DNR/DNI and patient elects Full Code (Attempt Resuscitation)

## 2025-03-14 NOTE — ANESTHESIA POSTPROCEDURE EVALUATION
Department of Anesthesiology  Postprocedure Note    Patient: Lei Jaimes  MRN: [id number]  YOB: 1945  Date of evaluation: 3/14/2025    Procedure Summary       Date: 03/14/25 Room / Location: [facility] ENDO TRAVEL / [facility] ENDOSCOPY    Anesthesia Start: 1524 Anesthesia Stop:     Procedure: ESOPHAGOGASTRODUODENOSCOPY Diagnosis:       Dysphagia, unspecified type      (Dysphagia, unspecified type [R13.10])    Surgeons: Aki Parsons MD Responsible Provider: Akash Moralez Jr., MD    Anesthesia Type: MAC ASA Status: 2            Anesthesia Type: MAC    Ana Phase I:      Ana Phase II:      Anesthesia Post Evaluation    Patient location during evaluation: bedside  Patient participation: complete - patient participated  Level of consciousness: awake  Pain score: 0  Airway patency: patent  Nausea & Vomiting: no nausea and no vomiting  Cardiovascular status: tachycardic and hemodynamically stable  Respiratory status: spontaneous ventilation, acceptable and nasal cannula  Hydration status: euvolemic  Pain management: adequate    No notable events documented.

## 2025-03-14 NOTE — ANESTHESIA PRE PROCEDURE
Department of Anesthesiology  Preprocedure Note       Name:  Lei Jaimes   Age:  79 y.o.  :  1945                                          MRN:  [id number]         Date:  3/14/2025      Surgeon: Surgeon(s):  Aki Parsons MD    Procedure: Procedure(s):  ESOPHAGOGASTRODUODENOSCOPY    Medications prior to admission:   Prior to Admission medications    Medication Sig Start Date End Date Taking? Authorizing Provider   pantoprazole (PROTONIX) 40 MG tablet Take 1 tablet by mouth daily 24  Yes Autumn Soto Jr., MD   NIFEdipine (ADALAT CC) 60 MG extended release tablet  24  Yes ProviderBianca MD   lisinopril (PRINIVIL;ZESTRIL) 40 MG tablet Take 1 tablet by mouth daily 5/3/21  Yes Automatic Reconciliation, Ar   hydroCHLOROthiazide (HYDRODIURIL) 25 MG tablet Take 0.5 tablets by mouth daily   Yes Provider, MD Bianca       Current medications:    Current Facility-Administered Medications   Medication Dose Route Frequency Provider Last Rate Last Admin    sodium chloride flush 0.9 % injection 5-40 mL  5-40 mL IntraVENous 2 times per day Terrie Hopper MD   10 mL at 25 0923    sodium chloride flush 0.9 % injection 5-40 mL  5-40 mL IntraVENous PRN Terrie Hopper MD        0.9 % sodium chloride infusion   IntraVENous PRN Terrie Hopper MD        ondansetron (ZOFRAN-ODT) disintegrating tablet 4 mg  4 mg Oral Q8H PRN Terrie Hopper MD        Or    ondansetron (ZOFRAN) injection 4 mg  4 mg IntraVENous Q6H PRN Terrie Hopper MD   4 mg at 25    polyethylene glycol (GLYCOLAX) packet 17 g  17 g Oral Daily PRN Terrie Hopper MD        lactated ringers infusion   IntraVENous Continuous Terrie Hopper  mL/hr at 25 1524 NoRateChange at 25 1524    pantoprazole (PROTONIX) injection 40 mg  40 mg IntraVENous BID Terrie Hopper MD   40 mg at 25 0919    albuterol (PROVENTIL) nebulizer solution 2.5 mg  2.5 mg Nebulization Q2H PRN Ward

## 2025-03-15 LAB
ALBUMIN SERPL-MCNC: 2.7 G/DL (ref 3.5–5)
ALBUMIN/GLOB SERPL: 0.8 (ref 1.1–2.2)
ALP SERPL-CCNC: 36 U/L (ref 45–117)
ALT SERPL-CCNC: 23 U/L (ref 12–78)
ANION GAP SERPL CALC-SCNC: 11 MMOL/L (ref 2–12)
AST SERPL W P-5'-P-CCNC: 138 U/L (ref 15–37)
BILIRUB DIRECT SERPL-MCNC: 0.2 MG/DL (ref 0–0.2)
BILIRUB SERPL-MCNC: 0.6 MG/DL (ref 0.2–1)
BUN SERPL-MCNC: 58 MG/DL (ref 6–20)
BUN/CREAT SERPL: 30 (ref 12–20)
CA-I BLD-MCNC: 8.7 MG/DL (ref 8.5–10.1)
CHLORIDE SERPL-SCNC: 114 MMOL/L (ref 97–108)
CO2 SERPL-SCNC: 21 MMOL/L (ref 21–32)
CREAT SERPL-MCNC: 1.96 MG/DL (ref 0.7–1.3)
EKG ATRIAL RATE: 95 BPM
EKG DIAGNOSIS: NORMAL
EKG P AXIS: 76 DEGREES
EKG P-R INTERVAL: 172 MS
EKG Q-T INTERVAL: 358 MS
EKG QRS DURATION: 86 MS
EKG QTC CALCULATION (BAZETT): 449 MS
EKG R AXIS: 73 DEGREES
EKG T AXIS: 91 DEGREES
EKG VENTRICULAR RATE: 95 BPM
ERYTHROCYTE [DISTWIDTH] IN BLOOD BY AUTOMATED COUNT: 12.6 % (ref 11.5–14.5)
GLOBULIN SER CALC-MCNC: 3.3 G/DL (ref 2–4)
GLUCOSE BLD STRIP.AUTO-MCNC: 144 MG/DL (ref 65–100)
GLUCOSE SERPL-MCNC: 119 MG/DL (ref 65–100)
HCT VFR BLD AUTO: 21.6 % (ref 36.6–50.3)
HCT VFR BLD AUTO: 21.6 % (ref 36.6–50.3)
HCT VFR BLD AUTO: 22.7 % (ref 36.6–50.3)
HCT VFR BLD AUTO: 25.4 % (ref 36.6–50.3)
HCT VFR BLD AUTO: 26.7 % (ref 36.6–50.3)
HGB BLD-MCNC: 6.7 G/DL (ref 12.1–17)
HGB BLD-MCNC: 7 G/DL (ref 12.1–17)
HGB BLD-MCNC: 7.3 G/DL (ref 12.1–17)
HGB BLD-MCNC: 8.3 G/DL (ref 12.1–17)
HGB BLD-MCNC: 8.7 G/DL (ref 12.1–17)
MAGNESIUM SERPL-MCNC: 2 MG/DL (ref 1.6–2.4)
MCH RBC QN AUTO: 31.9 PG (ref 26–34)
MCHC RBC AUTO-ENTMCNC: 31 G/DL (ref 30–36.5)
MCV RBC AUTO: 102.9 FL (ref 80–99)
NRBC # BLD: 0 K/UL (ref 0–0.01)
NRBC BLD-RTO: 0 PER 100 WBC
PERFORMED BY:: ABNORMAL
PHOSPHATE SERPL-MCNC: 3.5 MG/DL (ref 2.6–4.7)
PLATELET # BLD AUTO: 125 K/UL (ref 150–400)
PMV BLD AUTO: 12.5 FL (ref 8.9–12.9)
POTASSIUM SERPL-SCNC: 4.2 MMOL/L (ref 3.5–5.1)
PROT SERPL-MCNC: 6 G/DL (ref 6.4–8.2)
RBC # BLD AUTO: 2.1 M/UL (ref 4.1–5.7)
SODIUM SERPL-SCNC: 146 MMOL/L (ref 136–145)
TROPONIN I SERPL HS-MCNC: ABNORMAL NG/L (ref 0–76)
TROPONIN I SERPL HS-MCNC: ABNORMAL NG/L (ref 0–76)
WBC # BLD AUTO: 14.3 K/UL (ref 4.1–11.1)

## 2025-03-15 PROCEDURE — 80048 BASIC METABOLIC PNL TOTAL CA: CPT

## 2025-03-15 PROCEDURE — 2000000000 HC ICU R&B

## 2025-03-15 PROCEDURE — 2700000000 HC OXYGEN THERAPY PER DAY

## 2025-03-15 PROCEDURE — 85018 HEMOGLOBIN: CPT

## 2025-03-15 PROCEDURE — 36430 TRANSFUSION BLD/BLD COMPNT: CPT

## 2025-03-15 PROCEDURE — 85027 COMPLETE CBC AUTOMATED: CPT

## 2025-03-15 PROCEDURE — 84484 ASSAY OF TROPONIN QUANT: CPT

## 2025-03-15 PROCEDURE — 80076 HEPATIC FUNCTION PANEL: CPT

## 2025-03-15 PROCEDURE — 6360000002 HC RX W HCPCS: Performed by: STUDENT IN AN ORGANIZED HEALTH CARE EDUCATION/TRAINING PROGRAM

## 2025-03-15 PROCEDURE — 83735 ASSAY OF MAGNESIUM: CPT

## 2025-03-15 PROCEDURE — 2580000003 HC RX 258: Performed by: STUDENT IN AN ORGANIZED HEALTH CARE EDUCATION/TRAINING PROGRAM

## 2025-03-15 PROCEDURE — 30233N1 TRANSFUSION OF NONAUTOLOGOUS RED BLOOD CELLS INTO PERIPHERAL VEIN, PERCUTANEOUS APPROACH: ICD-10-PCS | Performed by: PHYSICIAN ASSISTANT

## 2025-03-15 PROCEDURE — P9016 RBC LEUKOCYTES REDUCED: HCPCS

## 2025-03-15 PROCEDURE — 2500000003 HC RX 250 WO HCPCS: Performed by: STUDENT IN AN ORGANIZED HEALTH CARE EDUCATION/TRAINING PROGRAM

## 2025-03-15 PROCEDURE — 84100 ASSAY OF PHOSPHORUS: CPT

## 2025-03-15 PROCEDURE — 85014 HEMATOCRIT: CPT

## 2025-03-15 PROCEDURE — 94761 N-INVAS EAR/PLS OXIMETRY MLT: CPT

## 2025-03-15 PROCEDURE — 82962 GLUCOSE BLOOD TEST: CPT

## 2025-03-15 PROCEDURE — 93005 ELECTROCARDIOGRAM TRACING: CPT | Performed by: PHYSICIAN ASSISTANT

## 2025-03-15 RX ORDER — SODIUM CHLORIDE 9 MG/ML
INJECTION, SOLUTION INTRAVENOUS PRN
Status: DISCONTINUED | OUTPATIENT
Start: 2025-03-15 | End: 2025-03-18

## 2025-03-15 RX ADMIN — SODIUM CHLORIDE, POTASSIUM CHLORIDE, SODIUM LACTATE AND CALCIUM CHLORIDE: 600; 310; 30; 20 INJECTION, SOLUTION INTRAVENOUS at 11:33

## 2025-03-15 RX ADMIN — PANTOPRAZOLE SODIUM 40 MG: 40 INJECTION, POWDER, FOR SOLUTION INTRAVENOUS at 20:40

## 2025-03-15 RX ADMIN — SODIUM CHLORIDE, POTASSIUM CHLORIDE, SODIUM LACTATE AND CALCIUM CHLORIDE: 600; 310; 30; 20 INJECTION, SOLUTION INTRAVENOUS at 03:19

## 2025-03-15 RX ADMIN — SODIUM CHLORIDE, PRESERVATIVE FREE 10 ML: 5 INJECTION INTRAVENOUS at 20:40

## 2025-03-15 RX ADMIN — SODIUM CHLORIDE, POTASSIUM CHLORIDE, SODIUM LACTATE AND CALCIUM CHLORIDE: 600; 310; 30; 20 INJECTION, SOLUTION INTRAVENOUS at 21:38

## 2025-03-15 RX ADMIN — PANTOPRAZOLE SODIUM 40 MG: 40 INJECTION, POWDER, FOR SOLUTION INTRAVENOUS at 09:09

## 2025-03-15 RX ADMIN — HYDROMORPHONE HYDROCHLORIDE 0.5 MG: 1 INJECTION, SOLUTION INTRAMUSCULAR; INTRAVENOUS; SUBCUTANEOUS at 03:21

## 2025-03-15 ASSESSMENT — PAIN SCALES - GENERAL
PAINLEVEL_OUTOF10: 0

## 2025-03-16 ENCOUNTER — APPOINTMENT (OUTPATIENT)
Facility: HOSPITAL | Age: 80
DRG: 380 | End: 2025-03-16
Attending: STUDENT IN AN ORGANIZED HEALTH CARE EDUCATION/TRAINING PROGRAM
Payer: MEDICARE

## 2025-03-16 LAB
ABO + RH BLD: NORMAL
ALBUMIN SERPL-MCNC: 2.7 G/DL (ref 3.5–5)
ALBUMIN/GLOB SERPL: 0.7 (ref 1.1–2.2)
ALP SERPL-CCNC: 49 U/L (ref 45–117)
ALT SERPL-CCNC: 33 U/L (ref 12–78)
ANION GAP SERPL CALC-SCNC: 4 MMOL/L (ref 2–12)
ANION GAP SERPL CALC-SCNC: 5 MMOL/L (ref 2–12)
ARTERIAL PATENCY WRIST A: YES
AST SERPL W P-5'-P-CCNC: 188 U/L (ref 15–37)
BACTERIA SPEC CULT: NORMAL
BACTERIA SPEC CULT: NORMAL
BASE EXCESS BLDA CALC-SCNC: 1.1 MMOL/L (ref 0–3)
BDY SITE: ABNORMAL
BILIRUB DIRECT SERPL-MCNC: 0.3 MG/DL (ref 0–0.2)
BILIRUB SERPL-MCNC: 1.2 MG/DL (ref 0.2–1)
BLD PROD TYP BPU: NORMAL
BLD PROD TYP BPU: NORMAL
BLOOD BANK BLOOD PRODUCT EXPIRATION DATE: NORMAL
BLOOD BANK BLOOD PRODUCT EXPIRATION DATE: NORMAL
BLOOD BANK DISPENSE STATUS: NORMAL
BLOOD BANK DISPENSE STATUS: NORMAL
BLOOD BANK ISBT PRODUCT BLOOD TYPE: 5100
BLOOD BANK ISBT PRODUCT BLOOD TYPE: 5100
BLOOD BANK PRODUCT CODE: NORMAL
BLOOD BANK PRODUCT CODE: NORMAL
BLOOD BANK UNIT TYPE AND RH: NORMAL
BLOOD BANK UNIT TYPE AND RH: NORMAL
BLOOD GROUP ANTIBODIES SERPL: NEGATIVE
BODY TEMPERATURE: 98.6
BPU ID: NORMAL
BPU ID: NORMAL
BUN SERPL-MCNC: 43 MG/DL (ref 6–20)
BUN SERPL-MCNC: 47 MG/DL (ref 6–20)
BUN/CREAT SERPL: 22 (ref 12–20)
BUN/CREAT SERPL: 26 (ref 12–20)
CA-I BLD-MCNC: 8.2 MG/DL (ref 8.5–10.1)
CA-I BLD-MCNC: 8.5 MG/DL (ref 8.5–10.1)
CHLORIDE SERPL-SCNC: 110 MMOL/L (ref 97–108)
CHLORIDE SERPL-SCNC: 114 MMOL/L (ref 97–108)
CO2 SERPL-SCNC: 26 MMOL/L (ref 21–32)
CO2 SERPL-SCNC: 28 MMOL/L (ref 21–32)
COHGB MFR BLD: 0.3 % (ref 1–2)
CREAT SERPL-MCNC: 1.82 MG/DL (ref 0.7–1.3)
CREAT SERPL-MCNC: 1.94 MG/DL (ref 0.7–1.3)
CROSSMATCH RESULT: NORMAL
CROSSMATCH RESULT: NORMAL
ECHO AO ROOT DIAM: 3.5 CM
ECHO AO ROOT INDEX: 1.78 CM/M2
ECHO AV AREA PEAK VELOCITY: 2.6 CM2
ECHO AV AREA VTI: 2.4 CM2
ECHO AV AREA/BSA PEAK VELOCITY: 1.3 CM2/M2
ECHO AV AREA/BSA VTI: 1.2 CM2/M2
ECHO AV MEAN GRADIENT: 4 MMHG
ECHO AV MEAN VELOCITY: 0.9 M/S
ECHO AV PEAK GRADIENT: 6 MMHG
ECHO AV PEAK VELOCITY: 1.3 M/S
ECHO AV VELOCITY RATIO: 0.69
ECHO AV VTI: 22 CM
ECHO BSA: 1.91 M2
ECHO EST RA PRESSURE: 15 MMHG
ECHO LA DIAMETER INDEX: 2.03 CM/M2
ECHO LA DIAMETER: 4 CM
ECHO LA TO AORTIC ROOT RATIO: 1.14
ECHO LV EDV A4C: 176 ML
ECHO LV EDV INDEX A4C: 89 ML/M2
ECHO LV EF PHYSICIAN: 25 %
ECHO LV EJECTION FRACTION A4C: 28 %
ECHO LV EJECTION FRACTION BIPLANE: 27 % (ref 55–100)
ECHO LV ESV A4C: 127 ML
ECHO LV ESV INDEX A4C: 64 ML/M2
ECHO LV FRACTIONAL SHORTENING: 16 % (ref 28–44)
ECHO LV INTERNAL DIMENSION DIASTOLE INDEX: 2.89 CM/M2
ECHO LV INTERNAL DIMENSION DIASTOLIC: 5.7 CM (ref 4.2–5.9)
ECHO LV INTERNAL DIMENSION SYSTOLIC INDEX: 2.44 CM/M2
ECHO LV INTERNAL DIMENSION SYSTOLIC: 4.8 CM
ECHO LV IVSD: 1.1 CM (ref 0.6–1)
ECHO LV MASS 2D: 256.7 G (ref 88–224)
ECHO LV MASS INDEX 2D: 130.3 G/M2 (ref 49–115)
ECHO LV POSTERIOR WALL DIASTOLIC: 1.1 CM (ref 0.6–1)
ECHO LV RELATIVE WALL THICKNESS RATIO: 0.39
ECHO LVOT AREA: 3.8 CM2
ECHO LVOT AV VTI INDEX: 0.62
ECHO LVOT DIAM: 2.2 CM
ECHO LVOT MEAN GRADIENT: 2 MMHG
ECHO LVOT PEAK GRADIENT: 3 MMHG
ECHO LVOT PEAK VELOCITY: 0.9 M/S
ECHO LVOT STROKE VOLUME INDEX: 26.4 ML/M2
ECHO LVOT SV: 52.1 ML
ECHO LVOT VTI: 13.7 CM
ECHO MV AREA VTI: 3.4 CM2
ECHO MV LVOT VTI INDEX: 1.1
ECHO MV MAX VELOCITY: 0.9 M/S
ECHO MV MEAN GRADIENT: 2 MMHG
ECHO MV MEAN VELOCITY: 0.7 M/S
ECHO MV PEAK GRADIENT: 3 MMHG
ECHO MV REGURGITANT PEAK GRADIENT: 36 MMHG
ECHO MV REGURGITANT PEAK VELOCITY: 3 M/S
ECHO MV VTI: 15.1 CM
ECHO PV MAX VELOCITY: 0.5 M/S
ECHO PV MEAN GRADIENT: 1 MMHG
ECHO PV MEAN VELOCITY: 0.4 M/S
ECHO PV PEAK GRADIENT: 1 MMHG
ECHO PV VTI: 9.9 CM
ECHO RV FREE WALL PEAK S': 14.1 CM/S
ECHO RV TAPSE: 2.4 CM (ref 1.7–?)
EKG ATRIAL RATE: 104 BPM
EKG DIAGNOSIS: NORMAL
EKG P AXIS: 50 DEGREES
EKG P-R INTERVAL: 160 MS
EKG Q-T INTERVAL: 370 MS
EKG QRS DURATION: 86 MS
EKG QTC CALCULATION (BAZETT): 486 MS
EKG R AXIS: 47 DEGREES
EKG T AXIS: 71 DEGREES
EKG VENTRICULAR RATE: 104 BPM
ERYTHROCYTE [DISTWIDTH] IN BLOOD BY AUTOMATED COUNT: 14.8 % (ref 11.5–14.5)
FIO2 ON VENT: 90 %
GAS FLOW.O2 O2 DELIVERY SYS: 60 L/MIN
GLOBULIN SER CALC-MCNC: 3.8 G/DL (ref 2–4)
GLUCOSE BLD STRIP.AUTO-MCNC: 140 MG/DL (ref 65–100)
GLUCOSE SERPL-MCNC: 143 MG/DL (ref 65–100)
GLUCOSE SERPL-MCNC: 144 MG/DL (ref 65–100)
HCO3 BLDA-SCNC: 24 MMOL/L (ref 22–26)
HCT VFR BLD AUTO: 31.3 % (ref 36.6–50.3)
HGB BLD-MCNC: 10 G/DL (ref 12.1–17)
Lab: NORMAL
Lab: NORMAL
MAGNESIUM SERPL-MCNC: 1.9 MG/DL (ref 1.6–2.4)
MCH RBC QN AUTO: 31.1 PG (ref 26–34)
MCHC RBC AUTO-ENTMCNC: 31.9 G/DL (ref 30–36.5)
MCV RBC AUTO: 97.2 FL (ref 80–99)
METHGB MFR BLD: 0.3 % (ref 0–1.4)
NRBC # BLD: 0.02 K/UL (ref 0–0.01)
NRBC BLD-RTO: 0.1 PER 100 WBC
OXYHGB MFR BLD: 90.9 % (ref 95–99)
PCO2 BLDA: 30 MMHG (ref 35–45)
PERFORMED BY:: ABNORMAL
PERFORMED BY:: ABNORMAL
PH BLDA: 7.51 (ref 7.35–7.45)
PHOSPHATE SERPL-MCNC: 2.8 MG/DL (ref 2.6–4.7)
PLATELET # BLD AUTO: 161 K/UL (ref 150–400)
PMV BLD AUTO: 12.1 FL (ref 8.9–12.9)
PO2 BLDA: 62 MMHG (ref 80–100)
POTASSIUM SERPL-SCNC: 3.8 MMOL/L (ref 3.5–5.1)
POTASSIUM SERPL-SCNC: 4 MMOL/L (ref 3.5–5.1)
PROT SERPL-MCNC: 6.5 G/DL (ref 6.4–8.2)
RBC # BLD AUTO: 3.22 M/UL (ref 4.1–5.7)
SAO2 % BLD: 91 % (ref 95–99)
SAO2% DEVICE SAO2% SENSOR NAME: ABNORMAL
SODIUM SERPL-SCNC: 143 MMOL/L (ref 136–145)
SODIUM SERPL-SCNC: 144 MMOL/L (ref 136–145)
SPECIMEN EXP DATE BLD: NORMAL
SPECIMEN SITE: ABNORMAL
TRANSFUSION STATUS PATIENT QL: NORMAL
TRANSFUSION STATUS PATIENT QL: NORMAL
TROPONIN I SERPL HS-MCNC: ABNORMAL NG/L (ref 0–76)
UNIT DIVISION: 0
UNIT DIVISION: 0
UNIT ISSUE DATE/TIME: NORMAL
UNIT ISSUE DATE/TIME: NORMAL
WBC # BLD AUTO: 17.6 K/UL (ref 4.1–11.1)

## 2025-03-16 PROCEDURE — 6370000000 HC RX 637 (ALT 250 FOR IP): Performed by: INTERNAL MEDICINE

## 2025-03-16 PROCEDURE — 93005 ELECTROCARDIOGRAM TRACING: CPT | Performed by: INTERNAL MEDICINE

## 2025-03-16 PROCEDURE — 2000000000 HC ICU R&B

## 2025-03-16 PROCEDURE — 2700000000 HC OXYGEN THERAPY PER DAY

## 2025-03-16 PROCEDURE — 36600 WITHDRAWAL OF ARTERIAL BLOOD: CPT

## 2025-03-16 PROCEDURE — 82803 BLOOD GASES ANY COMBINATION: CPT

## 2025-03-16 PROCEDURE — 84100 ASSAY OF PHOSPHORUS: CPT

## 2025-03-16 PROCEDURE — 80076 HEPATIC FUNCTION PANEL: CPT

## 2025-03-16 PROCEDURE — 71045 X-RAY EXAM CHEST 1 VIEW: CPT

## 2025-03-16 PROCEDURE — 83735 ASSAY OF MAGNESIUM: CPT

## 2025-03-16 PROCEDURE — 6360000002 HC RX W HCPCS: Performed by: STUDENT IN AN ORGANIZED HEALTH CARE EDUCATION/TRAINING PROGRAM

## 2025-03-16 PROCEDURE — 6360000004 HC RX CONTRAST MEDICATION

## 2025-03-16 PROCEDURE — 80048 BASIC METABOLIC PNL TOTAL CA: CPT

## 2025-03-16 PROCEDURE — 2580000003 HC RX 258: Performed by: STUDENT IN AN ORGANIZED HEALTH CARE EDUCATION/TRAINING PROGRAM

## 2025-03-16 PROCEDURE — 85027 COMPLETE CBC AUTOMATED: CPT

## 2025-03-16 PROCEDURE — 36415 COLL VENOUS BLD VENIPUNCTURE: CPT

## 2025-03-16 PROCEDURE — 2500000003 HC RX 250 WO HCPCS: Performed by: STUDENT IN AN ORGANIZED HEALTH CARE EDUCATION/TRAINING PROGRAM

## 2025-03-16 PROCEDURE — 6370000000 HC RX 637 (ALT 250 FOR IP): Performed by: STUDENT IN AN ORGANIZED HEALTH CARE EDUCATION/TRAINING PROGRAM

## 2025-03-16 PROCEDURE — 93325 DOPPLER ECHO COLOR FLOW MAPG: CPT

## 2025-03-16 PROCEDURE — 84484 ASSAY OF TROPONIN QUANT: CPT

## 2025-03-16 PROCEDURE — 5A0935A ASSISTANCE WITH RESPIRATORY VENTILATION, LESS THAN 24 CONSECUTIVE HOURS, HIGH NASAL FLOW/VELOCITY: ICD-10-PCS | Performed by: NURSE PRACTITIONER

## 2025-03-16 PROCEDURE — 94761 N-INVAS EAR/PLS OXIMETRY MLT: CPT

## 2025-03-16 PROCEDURE — 5A09457 ASSISTANCE WITH RESPIRATORY VENTILATION, 24-96 CONSECUTIVE HOURS, CONTINUOUS POSITIVE AIRWAY PRESSURE: ICD-10-PCS | Performed by: NURSE PRACTITIONER

## 2025-03-16 PROCEDURE — 82962 GLUCOSE BLOOD TEST: CPT

## 2025-03-16 PROCEDURE — 6360000002 HC RX W HCPCS: Performed by: NURSE PRACTITIONER

## 2025-03-16 RX ORDER — FUROSEMIDE 10 MG/ML
20 INJECTION INTRAMUSCULAR; INTRAVENOUS ONCE
Status: COMPLETED | OUTPATIENT
Start: 2025-03-16 | End: 2025-03-16

## 2025-03-16 RX ORDER — PANTOPRAZOLE SODIUM 40 MG/1
40 TABLET, DELAYED RELEASE ORAL
Status: DISCONTINUED | OUTPATIENT
Start: 2025-03-16 | End: 2025-03-17

## 2025-03-16 RX ORDER — CALCIUM GLUCONATE 20 MG/ML
2000 INJECTION, SOLUTION INTRAVENOUS ONCE
Status: COMPLETED | OUTPATIENT
Start: 2025-03-16 | End: 2025-03-16

## 2025-03-16 RX ORDER — FUROSEMIDE 10 MG/ML
40 INJECTION INTRAMUSCULAR; INTRAVENOUS ONCE
Status: COMPLETED | OUTPATIENT
Start: 2025-03-16 | End: 2025-03-16

## 2025-03-16 RX ADMIN — CEFTRIAXONE SODIUM 1000 MG: 1 INJECTION, POWDER, FOR SOLUTION INTRAMUSCULAR; INTRAVENOUS at 07:33

## 2025-03-16 RX ADMIN — SULFUR HEXAFLUORIDE 5 ML: KIT at 16:52

## 2025-03-16 RX ADMIN — AMIODARONE HYDROCHLORIDE 150 MG: 1.5 INJECTION, SOLUTION INTRAVENOUS at 09:39

## 2025-03-16 RX ADMIN — SODIUM CHLORIDE, PRESERVATIVE FREE 10 ML: 5 INJECTION INTRAVENOUS at 20:28

## 2025-03-16 RX ADMIN — FUROSEMIDE 40 MG: 10 INJECTION, SOLUTION INTRAMUSCULAR; INTRAVENOUS at 07:29

## 2025-03-16 RX ADMIN — FUROSEMIDE 20 MG: 10 INJECTION, SOLUTION INTRAMUSCULAR; INTRAVENOUS at 03:59

## 2025-03-16 RX ADMIN — CALCIUM GLUCONATE 2000 MG: 20 INJECTION, SOLUTION INTRAVENOUS at 16:44

## 2025-03-16 RX ADMIN — PANTOPRAZOLE SODIUM 40 MG: 40 TABLET, DELAYED RELEASE ORAL at 16:45

## 2025-03-16 RX ADMIN — PANTOPRAZOLE SODIUM 40 MG: 40 INJECTION, POWDER, FOR SOLUTION INTRAVENOUS at 09:15

## 2025-03-16 RX ADMIN — METOPROLOL TARTRATE 12.5 MG: 25 TABLET, FILM COATED ORAL at 09:33

## 2025-03-16 RX ADMIN — AZITHROMYCIN MONOHYDRATE 500 MG: 500 INJECTION, POWDER, LYOPHILIZED, FOR SOLUTION INTRAVENOUS at 07:43

## 2025-03-16 RX ADMIN — METOPROLOL TARTRATE 12.5 MG: 25 TABLET, FILM COATED ORAL at 20:30

## 2025-03-16 ASSESSMENT — PAIN SCALES - GENERAL
PAINLEVEL_OUTOF10: 0

## 2025-03-17 ENCOUNTER — APPOINTMENT (OUTPATIENT)
Facility: HOSPITAL | Age: 80
DRG: 380 | End: 2025-03-17
Attending: STUDENT IN AN ORGANIZED HEALTH CARE EDUCATION/TRAINING PROGRAM
Payer: MEDICARE

## 2025-03-17 LAB
ANION GAP SERPL CALC-SCNC: 3 MMOL/L (ref 2–12)
APTT PPP: 28.1 SEC (ref 21.2–34.1)
BUN SERPL-MCNC: 42 MG/DL (ref 6–20)
BUN/CREAT SERPL: 23 (ref 12–20)
CA-I BLD-MCNC: 8.3 MG/DL (ref 8.5–10.1)
CHLORIDE SERPL-SCNC: 112 MMOL/L (ref 97–108)
CO2 SERPL-SCNC: 28 MMOL/L (ref 21–32)
CREAT SERPL-MCNC: 1.82 MG/DL (ref 0.7–1.3)
ERYTHROCYTE [DISTWIDTH] IN BLOOD BY AUTOMATED COUNT: 14 % (ref 11.5–14.5)
GLUCOSE SERPL-MCNC: 129 MG/DL (ref 65–100)
HCT VFR BLD AUTO: 28 % (ref 36.6–50.3)
HGB BLD-MCNC: 9 G/DL (ref 12.1–17)
INR PPP: 1.2 (ref 0.9–1.1)
MAGNESIUM SERPL-MCNC: 1.9 MG/DL (ref 1.6–2.4)
MCH RBC QN AUTO: 30.9 PG (ref 26–34)
MCHC RBC AUTO-ENTMCNC: 32.1 G/DL (ref 30–36.5)
MCV RBC AUTO: 96.2 FL (ref 80–99)
NRBC # BLD: 0 K/UL (ref 0–0.01)
NRBC BLD-RTO: 0 PER 100 WBC
PHOSPHATE SERPL-MCNC: 3.1 MG/DL (ref 2.6–4.7)
PLATELET # BLD AUTO: 159 K/UL (ref 150–400)
PMV BLD AUTO: 12.2 FL (ref 8.9–12.9)
POTASSIUM SERPL-SCNC: 3.7 MMOL/L (ref 3.5–5.1)
PROTHROMBIN TIME: 15.5 SEC (ref 11.9–14.6)
RBC # BLD AUTO: 2.91 M/UL (ref 4.1–5.7)
SODIUM SERPL-SCNC: 143 MMOL/L (ref 136–145)
THERAPEUTIC RANGE: NORMAL SEC (ref 82–109)
TROPONIN I SERPL HS-MCNC: ABNORMAL NG/L (ref 0–76)
WBC # BLD AUTO: 13.8 K/UL (ref 4.1–11.1)

## 2025-03-17 PROCEDURE — 6370000000 HC RX 637 (ALT 250 FOR IP): Performed by: STUDENT IN AN ORGANIZED HEALTH CARE EDUCATION/TRAINING PROGRAM

## 2025-03-17 PROCEDURE — 83735 ASSAY OF MAGNESIUM: CPT

## 2025-03-17 PROCEDURE — 85730 THROMBOPLASTIN TIME PARTIAL: CPT

## 2025-03-17 PROCEDURE — 85027 COMPLETE CBC AUTOMATED: CPT

## 2025-03-17 PROCEDURE — 36415 COLL VENOUS BLD VENIPUNCTURE: CPT

## 2025-03-17 PROCEDURE — 84484 ASSAY OF TROPONIN QUANT: CPT

## 2025-03-17 PROCEDURE — 2500000003 HC RX 250 WO HCPCS: Performed by: STUDENT IN AN ORGANIZED HEALTH CARE EDUCATION/TRAINING PROGRAM

## 2025-03-17 PROCEDURE — 80048 BASIC METABOLIC PNL TOTAL CA: CPT

## 2025-03-17 PROCEDURE — 6360000002 HC RX W HCPCS: Performed by: STUDENT IN AN ORGANIZED HEALTH CARE EDUCATION/TRAINING PROGRAM

## 2025-03-17 PROCEDURE — 2700000000 HC OXYGEN THERAPY PER DAY

## 2025-03-17 PROCEDURE — 6370000000 HC RX 637 (ALT 250 FOR IP): Performed by: INTERNAL MEDICINE

## 2025-03-17 PROCEDURE — 2580000003 HC RX 258: Performed by: STUDENT IN AN ORGANIZED HEALTH CARE EDUCATION/TRAINING PROGRAM

## 2025-03-17 PROCEDURE — 94761 N-INVAS EAR/PLS OXIMETRY MLT: CPT

## 2025-03-17 PROCEDURE — 84100 ASSAY OF PHOSPHORUS: CPT

## 2025-03-17 PROCEDURE — 2000000000 HC ICU R&B

## 2025-03-17 PROCEDURE — 85610 PROTHROMBIN TIME: CPT

## 2025-03-17 PROCEDURE — 71045 X-RAY EXAM CHEST 1 VIEW: CPT

## 2025-03-17 PROCEDURE — 94660 CPAP INITIATION&MGMT: CPT

## 2025-03-17 RX ORDER — FUROSEMIDE 10 MG/ML
20 INJECTION INTRAMUSCULAR; INTRAVENOUS DAILY
Status: DISCONTINUED | OUTPATIENT
Start: 2025-03-17 | End: 2025-03-17

## 2025-03-17 RX ORDER — METOPROLOL TARTRATE 25 MG/1
25 TABLET, FILM COATED ORAL 2 TIMES DAILY
Status: DISCONTINUED | OUTPATIENT
Start: 2025-03-17 | End: 2025-03-17

## 2025-03-17 RX ORDER — CARVEDILOL 3.12 MG/1
6.25 TABLET ORAL 2 TIMES DAILY WITH MEALS
Status: DISCONTINUED | OUTPATIENT
Start: 2025-03-17 | End: 2025-03-18

## 2025-03-17 RX ORDER — PANTOPRAZOLE SODIUM 40 MG/1
40 TABLET, DELAYED RELEASE ORAL
Status: DISPENSED | OUTPATIENT
Start: 2025-03-17 | End: 2025-03-24

## 2025-03-17 RX ORDER — CARVEDILOL 3.12 MG/1
3.12 TABLET ORAL 2 TIMES DAILY WITH MEALS
Status: DISCONTINUED | OUTPATIENT
Start: 2025-03-17 | End: 2025-03-17

## 2025-03-17 RX ORDER — ATORVASTATIN CALCIUM 40 MG/1
40 TABLET, FILM COATED ORAL NIGHTLY
Status: DISCONTINUED | OUTPATIENT
Start: 2025-03-17 | End: 2025-03-18

## 2025-03-17 RX ORDER — FUROSEMIDE 10 MG/ML
60 INJECTION INTRAMUSCULAR; INTRAVENOUS ONCE
Status: COMPLETED | OUTPATIENT
Start: 2025-03-17 | End: 2025-03-17

## 2025-03-17 RX ADMIN — CEFTRIAXONE SODIUM 1000 MG: 1 INJECTION, POWDER, FOR SOLUTION INTRAMUSCULAR; INTRAVENOUS at 06:45

## 2025-03-17 RX ADMIN — PANTOPRAZOLE SODIUM 40 MG: 40 TABLET, DELAYED RELEASE ORAL at 08:31

## 2025-03-17 RX ADMIN — CARVEDILOL 6.25 MG: 3.12 TABLET, FILM COATED ORAL at 17:12

## 2025-03-17 RX ADMIN — FUROSEMIDE 60 MG: 10 INJECTION, SOLUTION INTRAMUSCULAR; INTRAVENOUS at 08:31

## 2025-03-17 RX ADMIN — SODIUM CHLORIDE, PRESERVATIVE FREE 10 ML: 5 INJECTION INTRAVENOUS at 08:34

## 2025-03-17 RX ADMIN — SODIUM CHLORIDE, PRESERVATIVE FREE 10 ML: 5 INJECTION INTRAVENOUS at 21:31

## 2025-03-17 RX ADMIN — PANTOPRAZOLE SODIUM 40 MG: 40 TABLET, DELAYED RELEASE ORAL at 17:12

## 2025-03-17 RX ADMIN — SODIUM CHLORIDE, PRESERVATIVE FREE 10 ML: 5 INJECTION INTRAVENOUS at 08:35

## 2025-03-17 RX ADMIN — ATORVASTATIN CALCIUM 40 MG: 40 TABLET, FILM COATED ORAL at 21:30

## 2025-03-17 RX ADMIN — METOPROLOL TARTRATE 25 MG: 25 TABLET, FILM COATED ORAL at 08:31

## 2025-03-17 RX ADMIN — AZITHROMYCIN MONOHYDRATE 500 MG: 500 INJECTION, POWDER, LYOPHILIZED, FOR SOLUTION INTRAVENOUS at 07:02

## 2025-03-17 ASSESSMENT — PAIN SCALES - GENERAL
PAINLEVEL_OUTOF10: 0
PAINLEVEL_OUTOF10: 0

## 2025-03-17 NOTE — CARE COORDINATION
CM reviewed Pt medicals, Pt lives with his wife and was IND with ADL.    Will need PT/OT eval/recommendations.     Per IDR  Pt admitted for upper GI bleed    A&Ox4    Pt on high flow. 45/45    NPO except sips with meds.     PT/OT ordered.

## 2025-03-18 LAB
ALBUMIN SERPL-MCNC: 2.2 G/DL (ref 3.5–5)
ALBUMIN/GLOB SERPL: 0.6 (ref 1.1–2.2)
ALP SERPL-CCNC: 39 U/L (ref 45–117)
ALT SERPL-CCNC: 32 U/L (ref 12–78)
ANION GAP SERPL CALC-SCNC: 5 MMOL/L (ref 2–12)
AST SERPL W P-5'-P-CCNC: 64 U/L (ref 15–37)
BACTERIA SPEC CULT: NORMAL
BILIRUB DIRECT SERPL-MCNC: 0.3 MG/DL (ref 0–0.2)
BILIRUB SERPL-MCNC: 0.7 MG/DL (ref 0.2–1)
BUN SERPL-MCNC: 38 MG/DL (ref 6–20)
BUN/CREAT SERPL: 24 (ref 12–20)
CA-I BLD-MCNC: 8.6 MG/DL (ref 8.5–10.1)
CHLORIDE SERPL-SCNC: 111 MMOL/L (ref 97–108)
CO2 SERPL-SCNC: 27 MMOL/L (ref 21–32)
CREAT SERPL-MCNC: 1.57 MG/DL (ref 0.7–1.3)
EKG ATRIAL RATE: 98 BPM
EKG DIAGNOSIS: NORMAL
EKG P AXIS: 59 DEGREES
EKG P-R INTERVAL: 160 MS
EKG Q-T INTERVAL: 334 MS
EKG QRS DURATION: 100 MS
EKG QTC CALCULATION (BAZETT): 426 MS
EKG R AXIS: 83 DEGREES
EKG T AXIS: 67 DEGREES
EKG VENTRICULAR RATE: 98 BPM
ERYTHROCYTE [DISTWIDTH] IN BLOOD BY AUTOMATED COUNT: 13.6 % (ref 11.5–14.5)
GLOBULIN SER CALC-MCNC: 3.5 G/DL (ref 2–4)
GLUCOSE SERPL-MCNC: 114 MG/DL (ref 65–100)
HCT VFR BLD AUTO: 25.6 % (ref 36.6–50.3)
HGB BLD-MCNC: 8.1 G/DL (ref 12.1–17)
Lab: NORMAL
MAGNESIUM SERPL-MCNC: 1.9 MG/DL (ref 1.6–2.4)
MCH RBC QN AUTO: 30.6 PG (ref 26–34)
MCHC RBC AUTO-ENTMCNC: 31.6 G/DL (ref 30–36.5)
MCV RBC AUTO: 96.6 FL (ref 80–99)
NRBC # BLD: 0.02 K/UL (ref 0–0.01)
NRBC BLD-RTO: 0.2 PER 100 WBC
PHOSPHATE SERPL-MCNC: 3 MG/DL (ref 2.6–4.7)
PLATELET # BLD AUTO: 188 K/UL (ref 150–400)
PMV BLD AUTO: 12.2 FL (ref 8.9–12.9)
POTASSIUM SERPL-SCNC: 3.4 MMOL/L (ref 3.5–5.1)
PROT SERPL-MCNC: 5.7 G/DL (ref 6.4–8.2)
RBC # BLD AUTO: 2.65 M/UL (ref 4.1–5.7)
SODIUM SERPL-SCNC: 143 MMOL/L (ref 136–145)
TROPONIN I SERPL HS-MCNC: ABNORMAL NG/L (ref 0–76)
WBC # BLD AUTO: 12 K/UL (ref 4.1–11.1)

## 2025-03-18 PROCEDURE — 6370000000 HC RX 637 (ALT 250 FOR IP): Performed by: STUDENT IN AN ORGANIZED HEALTH CARE EDUCATION/TRAINING PROGRAM

## 2025-03-18 PROCEDURE — 93005 ELECTROCARDIOGRAM TRACING: CPT | Performed by: INTERNAL MEDICINE

## 2025-03-18 PROCEDURE — 83735 ASSAY OF MAGNESIUM: CPT

## 2025-03-18 PROCEDURE — 94761 N-INVAS EAR/PLS OXIMETRY MLT: CPT

## 2025-03-18 PROCEDURE — 36415 COLL VENOUS BLD VENIPUNCTURE: CPT

## 2025-03-18 PROCEDURE — 84484 ASSAY OF TROPONIN QUANT: CPT

## 2025-03-18 PROCEDURE — 85027 COMPLETE CBC AUTOMATED: CPT

## 2025-03-18 PROCEDURE — 2500000003 HC RX 250 WO HCPCS: Performed by: STUDENT IN AN ORGANIZED HEALTH CARE EDUCATION/TRAINING PROGRAM

## 2025-03-18 PROCEDURE — 92610 EVALUATE SWALLOWING FUNCTION: CPT

## 2025-03-18 PROCEDURE — 97530 THERAPEUTIC ACTIVITIES: CPT

## 2025-03-18 PROCEDURE — 84100 ASSAY OF PHOSPHORUS: CPT

## 2025-03-18 PROCEDURE — 80076 HEPATIC FUNCTION PANEL: CPT

## 2025-03-18 PROCEDURE — 6360000002 HC RX W HCPCS: Performed by: STUDENT IN AN ORGANIZED HEALTH CARE EDUCATION/TRAINING PROGRAM

## 2025-03-18 PROCEDURE — 6370000000 HC RX 637 (ALT 250 FOR IP): Performed by: INTERNAL MEDICINE

## 2025-03-18 PROCEDURE — 80048 BASIC METABOLIC PNL TOTAL CA: CPT

## 2025-03-18 PROCEDURE — 2000000000 HC ICU R&B

## 2025-03-18 PROCEDURE — 97165 OT EVAL LOW COMPLEX 30 MIN: CPT

## 2025-03-18 PROCEDURE — 2580000003 HC RX 258: Performed by: STUDENT IN AN ORGANIZED HEALTH CARE EDUCATION/TRAINING PROGRAM

## 2025-03-18 PROCEDURE — 2700000000 HC OXYGEN THERAPY PER DAY

## 2025-03-18 RX ORDER — METOPROLOL TARTRATE 25 MG/1
12.5 TABLET, FILM COATED ORAL 2 TIMES DAILY
Status: DISCONTINUED | OUTPATIENT
Start: 2025-03-18 | End: 2025-03-18

## 2025-03-18 RX ORDER — ATORVASTATIN CALCIUM 40 MG/1
80 TABLET, FILM COATED ORAL NIGHTLY
Status: DISCONTINUED | OUTPATIENT
Start: 2025-03-18 | End: 2025-04-03 | Stop reason: HOSPADM

## 2025-03-18 RX ORDER — METOPROLOL TARTRATE 25 MG/1
25 TABLET, FILM COATED ORAL 2 TIMES DAILY
Status: DISCONTINUED | OUTPATIENT
Start: 2025-03-18 | End: 2025-04-03 | Stop reason: HOSPADM

## 2025-03-18 RX ADMIN — POTASSIUM BICARBONATE 60 MEQ: 782 TABLET, EFFERVESCENT ORAL at 09:06

## 2025-03-18 RX ADMIN — SODIUM CHLORIDE, PRESERVATIVE FREE 10 ML: 5 INJECTION INTRAVENOUS at 09:06

## 2025-03-18 RX ADMIN — PANTOPRAZOLE SODIUM 40 MG: 40 TABLET, DELAYED RELEASE ORAL at 06:01

## 2025-03-18 RX ADMIN — SODIUM CHLORIDE, PRESERVATIVE FREE 10 ML: 5 INJECTION INTRAVENOUS at 21:23

## 2025-03-18 RX ADMIN — CARVEDILOL 6.25 MG: 3.12 TABLET, FILM COATED ORAL at 09:06

## 2025-03-18 RX ADMIN — ATORVASTATIN CALCIUM 80 MG: 40 TABLET, FILM COATED ORAL at 21:22

## 2025-03-18 RX ADMIN — METOPROLOL TARTRATE 25 MG: 25 TABLET, FILM COATED ORAL at 21:22

## 2025-03-18 RX ADMIN — PANTOPRAZOLE SODIUM 40 MG: 40 TABLET, DELAYED RELEASE ORAL at 17:35

## 2025-03-18 RX ADMIN — AZITHROMYCIN MONOHYDRATE 500 MG: 500 INJECTION, POWDER, LYOPHILIZED, FOR SOLUTION INTRAVENOUS at 07:00

## 2025-03-18 RX ADMIN — CEFTRIAXONE SODIUM 1000 MG: 1 INJECTION, POWDER, FOR SOLUTION INTRAMUSCULAR; INTRAVENOUS at 06:45

## 2025-03-18 RX ADMIN — METOPROLOL TARTRATE 5 MG: 5 INJECTION INTRAVENOUS at 18:14

## 2025-03-18 ASSESSMENT — PAIN SCALES - GENERAL
PAINLEVEL_OUTOF10: 0

## 2025-03-18 NOTE — CARE COORDINATION
CM reviewed Pt medicals, Pt lives with his wife and was IND with ADL.    PT/OT ordered    Per IDR  Pt admitted for NSTEMI and GI bleed.     Pt on High flow 50/45, try to wean to mid flow    Advance Pt to a regular diet.     If Pt can get to Mid-Flow will transfer to the floor.

## 2025-03-18 NOTE — THERAPY EVALUATION
Speech LAnguage Pathology Dysphagia EVALUATION/DISCHARGE    Patient: Lei Jaimes (79 y.o. male)  Date: 3/18/2025  Primary Diagnosis: Upper GI bleed [K92.2]  GIB (gastrointestinal bleeding) [K92.2]  Procedure(s) (LRB):  ESOPHAGOGASTRODUODENOSCOPY (N/A) 4 Days Post-Op   Precautions: aspiration Fall Risk                  Time In: 1223  Time Out: 1240    DIET RECOMMENDATIONS: Easy to chew, thin liquids, and GI soft/bland    SWALLOW SAFETY PRECAUTIONS: aspiration and GERD precautions, monitor pt closely for s/s aspiration, meds as tolerated  ASSESSMENT :  Based on the objective data described below, the patient presents with oropharyngeal sw function appearing WNL, known  underlying esophageal dysphagia.    T seen for bedside sw evaluation, see details below.  Pt admitted for choking episode. Pt known to have esophageal dysphagia and receives frequent esophageal dilations.  Pt developed hematemesis, elevated troponin, NSTEMI. Pt required increased 02 requirements with concerns for possible aspiration pna.     EGD results from 3-14-25 noted: \"Diffuse severe mucosal changes characterized by erosion,            hemorrhagic appearance, sloughing, ulceration, vertical lines, an            increased vascular pattern and friability (with spontaneous bleeding)            were found in the middle third of the esophagus. Worried about early            sign of Boerhaave Syndrome. the Area was successfully injected with 5            mL of a 0.1 mg/mL solution of epinephrine for hemostasis.         -  LA Grade C (one or more mucosal breaks continuous between tops of            2 or more mucosal folds, less than 75% circumference) esophagitis            with no bleeding was found in the middle and lower thirds of the            esophagus.         -  There is no endoscopic evidence of varices, ulcerations or            Olivia-Benson tear in the entire esophagus.         -  Patchy mild inflammation characterized by erythema was found

## 2025-03-19 ENCOUNTER — APPOINTMENT (OUTPATIENT)
Facility: HOSPITAL | Age: 80
DRG: 380 | End: 2025-03-19
Attending: STUDENT IN AN ORGANIZED HEALTH CARE EDUCATION/TRAINING PROGRAM
Payer: MEDICARE

## 2025-03-19 LAB
ANION GAP SERPL CALC-SCNC: 5 MMOL/L (ref 2–12)
ARTERIAL PATENCY WRIST A: YES
BASE EXCESS BLDA CALC-SCNC: 2.2 MMOL/L (ref 0–3)
BDY SITE: ABNORMAL
BODY TEMPERATURE: 98.6
BUN SERPL-MCNC: 36 MG/DL (ref 6–20)
BUN/CREAT SERPL: 25 (ref 12–20)
CA-I BLD-MCNC: 8.8 MG/DL (ref 8.5–10.1)
CHLORIDE SERPL-SCNC: 108 MMOL/L (ref 97–108)
CO2 SERPL-SCNC: 27 MMOL/L (ref 21–32)
COHGB MFR BLD: 0.3 % (ref 1–2)
CREAT SERPL-MCNC: 1.46 MG/DL (ref 0.7–1.3)
ERYTHROCYTE [DISTWIDTH] IN BLOOD BY AUTOMATED COUNT: 13.9 % (ref 11.5–14.5)
FIO2 ON VENT: 45 %
GAS FLOW.O2 SETTING OXYMISER: 14
GLUCOSE SERPL-MCNC: 123 MG/DL (ref 65–100)
HCO3 BLDA-SCNC: 25 MMOL/L (ref 22–26)
HCT VFR BLD AUTO: 26.7 % (ref 36.6–50.3)
HGB BLD-MCNC: 8.6 G/DL (ref 12.1–17)
IPAP/PIP: 12
MAGNESIUM SERPL-MCNC: 2 MG/DL (ref 1.6–2.4)
MCH RBC QN AUTO: 31.2 PG (ref 26–34)
MCHC RBC AUTO-ENTMCNC: 32.2 G/DL (ref 30–36.5)
MCV RBC AUTO: 96.7 FL (ref 80–99)
METHGB MFR BLD: 0.6 % (ref 0–1.4)
NRBC # BLD: 0 K/UL (ref 0–0.01)
NRBC BLD-RTO: 0 PER 100 WBC
OXYHGB MFR BLD: 93 % (ref 95–99)
PCO2 BLDA: 30 MMHG (ref 35–45)
PEEP RESPIRATORY: 7
PERFORMED BY:: ABNORMAL
PH BLDA: 7.52 (ref 7.35–7.45)
PHOSPHATE SERPL-MCNC: 3 MG/DL (ref 2.6–4.7)
PLATELET # BLD AUTO: 256 K/UL (ref 150–400)
PMV BLD AUTO: 12.2 FL (ref 8.9–12.9)
PO2 BLDA: 71 MMHG (ref 80–100)
POTASSIUM SERPL-SCNC: 3.7 MMOL/L (ref 3.5–5.1)
RBC # BLD AUTO: 2.76 M/UL (ref 4.1–5.7)
SAO2 % BLD: 94 % (ref 95–99)
SAO2% DEVICE SAO2% SENSOR NAME: ABNORMAL
SODIUM SERPL-SCNC: 140 MMOL/L (ref 136–145)
SPECIMEN SITE: ABNORMAL
VENTILATION MODE VENT: ABNORMAL
WBC # BLD AUTO: 13.9 K/UL (ref 4.1–11.1)

## 2025-03-19 PROCEDURE — 2580000003 HC RX 258: Performed by: STUDENT IN AN ORGANIZED HEALTH CARE EDUCATION/TRAINING PROGRAM

## 2025-03-19 PROCEDURE — 36415 COLL VENOUS BLD VENIPUNCTURE: CPT

## 2025-03-19 PROCEDURE — 6360000002 HC RX W HCPCS: Performed by: PHYSICIAN ASSISTANT

## 2025-03-19 PROCEDURE — 87641 MR-STAPH DNA AMP PROBE: CPT

## 2025-03-19 PROCEDURE — 85027 COMPLETE CBC AUTOMATED: CPT

## 2025-03-19 PROCEDURE — 97163 PT EVAL HIGH COMPLEX 45 MIN: CPT

## 2025-03-19 PROCEDURE — 2580000003 HC RX 258: Performed by: PHYSICIAN ASSISTANT

## 2025-03-19 PROCEDURE — 6370000000 HC RX 637 (ALT 250 FOR IP): Performed by: INTERNAL MEDICINE

## 2025-03-19 PROCEDURE — 71045 X-RAY EXAM CHEST 1 VIEW: CPT

## 2025-03-19 PROCEDURE — 6370000000 HC RX 637 (ALT 250 FOR IP): Performed by: STUDENT IN AN ORGANIZED HEALTH CARE EDUCATION/TRAINING PROGRAM

## 2025-03-19 PROCEDURE — 36600 WITHDRAWAL OF ARTERIAL BLOOD: CPT

## 2025-03-19 PROCEDURE — 94660 CPAP INITIATION&MGMT: CPT

## 2025-03-19 PROCEDURE — 82803 BLOOD GASES ANY COMBINATION: CPT

## 2025-03-19 PROCEDURE — 84100 ASSAY OF PHOSPHORUS: CPT

## 2025-03-19 PROCEDURE — 83735 ASSAY OF MAGNESIUM: CPT

## 2025-03-19 PROCEDURE — 97530 THERAPEUTIC ACTIVITIES: CPT

## 2025-03-19 PROCEDURE — 2500000003 HC RX 250 WO HCPCS: Performed by: STUDENT IN AN ORGANIZED HEALTH CARE EDUCATION/TRAINING PROGRAM

## 2025-03-19 PROCEDURE — 2060000000 HC ICU INTERMEDIATE R&B

## 2025-03-19 PROCEDURE — 80048 BASIC METABOLIC PNL TOTAL CA: CPT

## 2025-03-19 RX ORDER — FUROSEMIDE 10 MG/ML
80 INJECTION INTRAMUSCULAR; INTRAVENOUS ONCE
Status: COMPLETED | OUTPATIENT
Start: 2025-03-19 | End: 2025-03-19

## 2025-03-19 RX ADMIN — ATORVASTATIN CALCIUM 80 MG: 40 TABLET, FILM COATED ORAL at 21:06

## 2025-03-19 RX ADMIN — PIPERACILLIN AND TAZOBACTAM 3375 MG: 3; .375 INJECTION, POWDER, FOR SOLUTION INTRAVENOUS; PARENTERAL at 14:45

## 2025-03-19 RX ADMIN — SODIUM CHLORIDE: 0.9 INJECTION, SOLUTION INTRAVENOUS at 05:52

## 2025-03-19 RX ADMIN — PANTOPRAZOLE SODIUM 40 MG: 40 TABLET, DELAYED RELEASE ORAL at 14:43

## 2025-03-19 RX ADMIN — FUROSEMIDE 80 MG: 10 INJECTION, SOLUTION INTRAMUSCULAR; INTRAVENOUS at 05:49

## 2025-03-19 RX ADMIN — PIPERACILLIN AND TAZOBACTAM 3375 MG: 3; .375 INJECTION, POWDER, FOR SOLUTION INTRAVENOUS; PARENTERAL at 22:28

## 2025-03-19 RX ADMIN — SODIUM CHLORIDE, PRESERVATIVE FREE 10 ML: 5 INJECTION INTRAVENOUS at 21:06

## 2025-03-19 RX ADMIN — PIPERACILLIN AND TAZOBACTAM 3375 MG: 3; .375 INJECTION, POWDER, FOR SOLUTION INTRAVENOUS; PARENTERAL at 05:54

## 2025-03-19 RX ADMIN — METOPROLOL TARTRATE 25 MG: 25 TABLET, FILM COATED ORAL at 21:06

## 2025-03-19 ASSESSMENT — PAIN SCALES - GENERAL
PAINLEVEL_OUTOF10: 0

## 2025-03-19 NOTE — SIGNIFICANT EVENT
Overnight cross coverage:    79-year-old male admitted on 3/13/2025 who developed a choking event and developed right-sided chest pain.  He had small amounts of hematemesis and underwent EGD revealing Diffuse severe mucosal changes characterized by erosion with hemorrhagic appearance.  There was friability and spontaneous bleeding noted in the middle to third aspect of the esophagus.  Concern for Boerhaave's syndrome and epinephrine injection was performed. In addition troponins peaked at 32,000 secondary to acute NSTEMI.  Patient with an EF of 25 to 30% and complicated by SARA with chronic kidney disease.  Patient subsequently transfused 2 units of packed red blood cell.  Oxygen requirements have increased and patient is approximately 4 L positive.  Chest x-ray revealed congestive heart failure with interstitial pulmonary edema and pulmonary effusions and possible pulmonary infiltrates.  Patient was started on Zosyn as oxygen demand has increased from 2 L and now is on BiPAP at 45% FiO2.  Patient is resting comfortably.  An additional 80 of Lasix was given and Zosyn initiated due to an increasing white count at approximately 14,000.  MRSA PCR was negative and will hold vancomycin for now.  Have discussed with ICU attending who would like to try diuresis prior to transfer back to the ICU service even though patient is on acute BiPAP for acute respiratory failure with hypoxia he remains in the ICU awaiting transfer.  Hopefully we can wean patient off BiPAP and transfer to the floor when appropriate.  Oxygen requirements have increased overnight prior to the hospitalist service being notified at 5 AM.

## 2025-03-19 NOTE — CARE COORDINATION
CM reviewed Pt medicals, Pt lives with his wife and was IND with ADL.    Pt had transfer orders to the floor but then required more oxygen, so transferred back to ICU    OT recommends: Continue to assess pending progress.     Per IDR  Pt has been transferred to the floor since yesterday.

## 2025-03-20 LAB
ANION GAP SERPL CALC-SCNC: 5 MMOL/L (ref 2–12)
ARTERIAL PATENCY WRIST A: YES
BASE EXCESS BLDA CALC-SCNC: 3.8 MMOL/L (ref 0–3)
BDY SITE: ABNORMAL
BUN SERPL-MCNC: 36 MG/DL (ref 6–20)
BUN/CREAT SERPL: 19 (ref 12–20)
CA-I BLD-MCNC: 8.8 MG/DL (ref 8.5–10.1)
CHLORIDE SERPL-SCNC: 107 MMOL/L (ref 97–108)
CO2 SERPL-SCNC: 28 MMOL/L (ref 21–32)
COHGB MFR BLD: 0.3 % (ref 1–2)
CREAT SERPL-MCNC: 1.93 MG/DL (ref 0.7–1.3)
ERYTHROCYTE [DISTWIDTH] IN BLOOD BY AUTOMATED COUNT: 13.9 % (ref 11.5–14.5)
FIO2 ON VENT: 60 %
GLUCOSE SERPL-MCNC: 109 MG/DL (ref 65–100)
HCO3 BLDA-SCNC: 27 MMOL/L (ref 22–26)
HCT VFR BLD AUTO: 26.9 % (ref 36.6–50.3)
HGB BLD-MCNC: 8.7 G/DL (ref 12.1–17)
MAGNESIUM SERPL-MCNC: 1.9 MG/DL (ref 1.6–2.4)
MCH RBC QN AUTO: 31.3 PG (ref 26–34)
MCHC RBC AUTO-ENTMCNC: 32.3 G/DL (ref 30–36.5)
MCV RBC AUTO: 96.8 FL (ref 80–99)
METHGB MFR BLD: 0.4 % (ref 0–1.4)
MRSA DNA SPEC QL NAA+PROBE: NOT DETECTED
NRBC # BLD: 0 K/UL (ref 0–0.01)
NRBC BLD-RTO: 0 PER 100 WBC
OXYHGB MFR BLD: 96.6 % (ref 95–99)
PCO2 BLDA: 33 MMHG (ref 35–45)
PERFORMED BY:: ABNORMAL
PH BLDA: 7.52 (ref 7.35–7.45)
PHOSPHATE SERPL-MCNC: 3.9 MG/DL (ref 2.6–4.7)
PLATELET # BLD AUTO: 295 K/UL (ref 150–400)
PMV BLD AUTO: 12 FL (ref 8.9–12.9)
PO2 BLDA: 102 MMHG (ref 80–100)
POTASSIUM SERPL-SCNC: 3.4 MMOL/L (ref 3.5–5.1)
RBC # BLD AUTO: 2.78 M/UL (ref 4.1–5.7)
SAO2 % BLD: 97 % (ref 95–99)
SAO2% DEVICE SAO2% SENSOR NAME: ABNORMAL
SODIUM SERPL-SCNC: 140 MMOL/L (ref 136–145)
SPECIMEN SITE: ABNORMAL
WBC # BLD AUTO: 12.8 K/UL (ref 4.1–11.1)

## 2025-03-20 PROCEDURE — 36415 COLL VENOUS BLD VENIPUNCTURE: CPT

## 2025-03-20 PROCEDURE — 6360000002 HC RX W HCPCS: Performed by: PHYSICIAN ASSISTANT

## 2025-03-20 PROCEDURE — 6370000000 HC RX 637 (ALT 250 FOR IP): Performed by: INTERNAL MEDICINE

## 2025-03-20 PROCEDURE — 85027 COMPLETE CBC AUTOMATED: CPT

## 2025-03-20 PROCEDURE — 97530 THERAPEUTIC ACTIVITIES: CPT

## 2025-03-20 PROCEDURE — 51798 US URINE CAPACITY MEASURE: CPT

## 2025-03-20 PROCEDURE — 80048 BASIC METABOLIC PNL TOTAL CA: CPT

## 2025-03-20 PROCEDURE — 2060000000 HC ICU INTERMEDIATE R&B

## 2025-03-20 PROCEDURE — 2580000003 HC RX 258: Performed by: PHYSICIAN ASSISTANT

## 2025-03-20 PROCEDURE — 84100 ASSAY OF PHOSPHORUS: CPT

## 2025-03-20 PROCEDURE — 83735 ASSAY OF MAGNESIUM: CPT

## 2025-03-20 PROCEDURE — 6370000000 HC RX 637 (ALT 250 FOR IP): Performed by: STUDENT IN AN ORGANIZED HEALTH CARE EDUCATION/TRAINING PROGRAM

## 2025-03-20 PROCEDURE — 2700000000 HC OXYGEN THERAPY PER DAY

## 2025-03-20 PROCEDURE — 94761 N-INVAS EAR/PLS OXIMETRY MLT: CPT

## 2025-03-20 PROCEDURE — 2500000003 HC RX 250 WO HCPCS: Performed by: STUDENT IN AN ORGANIZED HEALTH CARE EDUCATION/TRAINING PROGRAM

## 2025-03-20 RX ORDER — FUROSEMIDE 40 MG/1
20 TABLET ORAL DAILY
Status: DISCONTINUED | OUTPATIENT
Start: 2025-03-20 | End: 2025-03-24

## 2025-03-20 RX ORDER — HYDRALAZINE HYDROCHLORIDE 10 MG/1
5 TABLET, FILM COATED ORAL EVERY 8 HOURS SCHEDULED
Status: DISCONTINUED | OUTPATIENT
Start: 2025-03-20 | End: 2025-03-21

## 2025-03-20 RX ADMIN — PIPERACILLIN AND TAZOBACTAM 3375 MG: 3; .375 INJECTION, POWDER, FOR SOLUTION INTRAVENOUS; PARENTERAL at 20:46

## 2025-03-20 RX ADMIN — PIPERACILLIN AND TAZOBACTAM 3375 MG: 3; .375 INJECTION, POWDER, FOR SOLUTION INTRAVENOUS; PARENTERAL at 13:45

## 2025-03-20 RX ADMIN — FUROSEMIDE 20 MG: 40 TABLET ORAL at 13:39

## 2025-03-20 RX ADMIN — METOPROLOL TARTRATE 25 MG: 25 TABLET, FILM COATED ORAL at 20:39

## 2025-03-20 RX ADMIN — ATORVASTATIN CALCIUM 80 MG: 40 TABLET, FILM COATED ORAL at 20:38

## 2025-03-20 RX ADMIN — SODIUM CHLORIDE, PRESERVATIVE FREE 10 ML: 5 INJECTION INTRAVENOUS at 21:01

## 2025-03-20 RX ADMIN — PANTOPRAZOLE SODIUM 40 MG: 40 TABLET, DELAYED RELEASE ORAL at 06:18

## 2025-03-20 RX ADMIN — SODIUM CHLORIDE, PRESERVATIVE FREE 10 ML: 5 INJECTION INTRAVENOUS at 09:02

## 2025-03-20 RX ADMIN — PANTOPRAZOLE SODIUM 40 MG: 40 TABLET, DELAYED RELEASE ORAL at 14:57

## 2025-03-20 RX ADMIN — PIPERACILLIN AND TAZOBACTAM 3375 MG: 3; .375 INJECTION, POWDER, FOR SOLUTION INTRAVENOUS; PARENTERAL at 06:21

## 2025-03-20 RX ADMIN — HYDRALAZINE HYDROCHLORIDE 5 MG: 10 TABLET ORAL at 15:03

## 2025-03-20 RX ADMIN — METOPROLOL TARTRATE 25 MG: 25 TABLET, FILM COATED ORAL at 09:01

## 2025-03-20 ASSESSMENT — PAIN SCALES - GENERAL
PAINLEVEL_OUTOF10: 2
PAINLEVEL_OUTOF10: 0
PAINLEVEL_OUTOF10: 2
PAINLEVEL_OUTOF10: 0

## 2025-03-20 ASSESSMENT — PAIN DESCRIPTION - ORIENTATION: ORIENTATION: LEFT

## 2025-03-20 ASSESSMENT — PAIN DESCRIPTION - DESCRIPTORS: DESCRIPTORS: SORE;ACHING

## 2025-03-20 ASSESSMENT — PAIN DESCRIPTION - FREQUENCY: FREQUENCY: INTERMITTENT

## 2025-03-20 ASSESSMENT — PAIN DESCRIPTION - LOCATION: LOCATION: KNEE

## 2025-03-20 ASSESSMENT — PAIN - FUNCTIONAL ASSESSMENT: PAIN_FUNCTIONAL_ASSESSMENT: ACTIVITIES ARE NOT PREVENTED

## 2025-03-20 NOTE — CARE COORDINATION
CM spoke with patient's wife, Alyssa [phone], to discuss HH, she is agreeable, no preference for HH agency, referral sent, awaiting possible acceptance.    CM continues to follow and monitor for needs.     2:20 PM - Patient accepted with [facility].

## 2025-03-21 ENCOUNTER — APPOINTMENT (OUTPATIENT)
Facility: HOSPITAL | Age: 80
DRG: 380 | End: 2025-03-21
Attending: STUDENT IN AN ORGANIZED HEALTH CARE EDUCATION/TRAINING PROGRAM
Payer: MEDICARE

## 2025-03-21 LAB
ANION GAP SERPL CALC-SCNC: 8 MMOL/L (ref 2–12)
BASOPHILS # BLD: 0 K/UL (ref 0–0.1)
BASOPHILS NFR BLD: 0 % (ref 0–1)
BUN SERPL-MCNC: 34 MG/DL (ref 6–20)
BUN/CREAT SERPL: 16 (ref 12–20)
CA-I BLD-MCNC: 8.4 MG/DL (ref 8.5–10.1)
CHLORIDE SERPL-SCNC: 107 MMOL/L (ref 97–108)
CO2 SERPL-SCNC: 25 MMOL/L (ref 21–32)
CREAT SERPL-MCNC: 2.14 MG/DL (ref 0.7–1.3)
DIFFERENTIAL METHOD BLD: ABNORMAL
EOSINOPHIL # BLD: 0 K/UL (ref 0–0.4)
EOSINOPHIL NFR BLD: 0 % (ref 0–7)
ERYTHROCYTE [DISTWIDTH] IN BLOOD BY AUTOMATED COUNT: 14.2 % (ref 11.5–14.5)
GLUCOSE SERPL-MCNC: 133 MG/DL (ref 65–100)
HCT VFR BLD AUTO: 27.3 % (ref 36.6–50.3)
HGB BLD-MCNC: 8.7 G/DL (ref 12.1–17)
IMM GRANULOCYTES # BLD AUTO: 0 K/UL
IMM GRANULOCYTES NFR BLD AUTO: 0 %
LYMPHOCYTES # BLD: 2.04 K/UL (ref 0.8–3.5)
LYMPHOCYTES NFR BLD: 10 % (ref 12–49)
MAGNESIUM SERPL-MCNC: 1.7 MG/DL (ref 1.6–2.4)
MCH RBC QN AUTO: 31.1 PG (ref 26–34)
MCHC RBC AUTO-ENTMCNC: 31.9 G/DL (ref 30–36.5)
MCV RBC AUTO: 97.5 FL (ref 80–99)
MONOCYTES # BLD: 0.82 K/UL (ref 0–1)
MONOCYTES NFR BLD: 4 % (ref 5–13)
MRSA DNA SPEC QL NAA+PROBE: NOT DETECTED
NEUTS BAND NFR BLD MANUAL: 1 % (ref 0–6)
NEUTS SEG # BLD: 17.54 K/UL (ref 1.8–8)
NEUTS SEG NFR BLD: 85 % (ref 32–75)
NRBC # BLD: 0 K/UL (ref 0–0.01)
NRBC BLD-RTO: 0 PER 100 WBC
PHOSPHATE SERPL-MCNC: 3.2 MG/DL (ref 2.6–4.7)
PLATELET # BLD AUTO: 323 K/UL (ref 150–400)
PMV BLD AUTO: 11.5 FL (ref 8.9–12.9)
POTASSIUM SERPL-SCNC: 3.2 MMOL/L (ref 3.5–5.1)
PROCALCITONIN SERPL-MCNC: 0.4 NG/ML
RBC # BLD AUTO: 2.8 M/UL (ref 4.1–5.7)
RBC MORPH BLD: ABNORMAL
SODIUM SERPL-SCNC: 140 MMOL/L (ref 136–145)
WBC # BLD AUTO: 20.4 K/UL (ref 4.1–11.1)

## 2025-03-21 PROCEDURE — 6370000000 HC RX 637 (ALT 250 FOR IP): Performed by: STUDENT IN AN ORGANIZED HEALTH CARE EDUCATION/TRAINING PROGRAM

## 2025-03-21 PROCEDURE — 84100 ASSAY OF PHOSPHORUS: CPT

## 2025-03-21 PROCEDURE — 2060000000 HC ICU INTERMEDIATE R&B

## 2025-03-21 PROCEDURE — 6360000002 HC RX W HCPCS: Performed by: PHYSICIAN ASSISTANT

## 2025-03-21 PROCEDURE — 2580000003 HC RX 258: Performed by: PHYSICIAN ASSISTANT

## 2025-03-21 PROCEDURE — 2500000003 HC RX 250 WO HCPCS

## 2025-03-21 PROCEDURE — 80048 BASIC METABOLIC PNL TOTAL CA: CPT

## 2025-03-21 PROCEDURE — 2580000003 HC RX 258: Performed by: STUDENT IN AN ORGANIZED HEALTH CARE EDUCATION/TRAINING PROGRAM

## 2025-03-21 PROCEDURE — 97530 THERAPEUTIC ACTIVITIES: CPT

## 2025-03-21 PROCEDURE — 73560 X-RAY EXAM OF KNEE 1 OR 2: CPT

## 2025-03-21 PROCEDURE — 6370000000 HC RX 637 (ALT 250 FOR IP): Performed by: INTERNAL MEDICINE

## 2025-03-21 PROCEDURE — 85025 COMPLETE CBC W/AUTO DIFF WBC: CPT

## 2025-03-21 PROCEDURE — 6360000002 HC RX W HCPCS

## 2025-03-21 PROCEDURE — 84145 PROCALCITONIN (PCT): CPT

## 2025-03-21 PROCEDURE — 71045 X-RAY EXAM CHEST 1 VIEW: CPT

## 2025-03-21 PROCEDURE — 2700000000 HC OXYGEN THERAPY PER DAY

## 2025-03-21 PROCEDURE — 83735 ASSAY OF MAGNESIUM: CPT

## 2025-03-21 PROCEDURE — 87641 MR-STAPH DNA AMP PROBE: CPT

## 2025-03-21 PROCEDURE — 36415 COLL VENOUS BLD VENIPUNCTURE: CPT

## 2025-03-21 PROCEDURE — 94761 N-INVAS EAR/PLS OXIMETRY MLT: CPT

## 2025-03-21 PROCEDURE — 2500000003 HC RX 250 WO HCPCS: Performed by: STUDENT IN AN ORGANIZED HEALTH CARE EDUCATION/TRAINING PROGRAM

## 2025-03-21 RX ORDER — MAGNESIUM SULFATE HEPTAHYDRATE 40 MG/ML
2000 INJECTION, SOLUTION INTRAVENOUS ONCE
Status: COMPLETED | OUTPATIENT
Start: 2025-03-21 | End: 2025-03-21

## 2025-03-21 RX ORDER — AZITHROMYCIN 500 MG/1
250 TABLET, FILM COATED ORAL DAILY
Status: COMPLETED | OUTPATIENT
Start: 2025-03-21 | End: 2025-03-25

## 2025-03-21 RX ORDER — ASPIRIN 81 MG/1
81 TABLET ORAL DAILY
Status: DISCONTINUED | OUTPATIENT
Start: 2025-03-21 | End: 2025-04-03 | Stop reason: HOSPADM

## 2025-03-21 RX ADMIN — METOPROLOL TARTRATE 25 MG: 25 TABLET, FILM COATED ORAL at 08:34

## 2025-03-21 RX ADMIN — EPOETIN ALFA-EPBX 10000 UNITS: 10000 INJECTION, SOLUTION INTRAVENOUS; SUBCUTANEOUS at 18:14

## 2025-03-21 RX ADMIN — PANTOPRAZOLE SODIUM 40 MG: 40 TABLET, DELAYED RELEASE ORAL at 15:31

## 2025-03-21 RX ADMIN — SODIUM CHLORIDE, PRESERVATIVE FREE 10 ML: 5 INJECTION INTRAVENOUS at 21:32

## 2025-03-21 RX ADMIN — HYDRALAZINE HYDROCHLORIDE 5 MG: 10 TABLET ORAL at 21:30

## 2025-03-21 RX ADMIN — ASPIRIN 81 MG: 81 TABLET, COATED ORAL at 15:32

## 2025-03-21 RX ADMIN — PANTOPRAZOLE SODIUM 40 MG: 40 TABLET, DELAYED RELEASE ORAL at 08:34

## 2025-03-21 RX ADMIN — AZITHROMYCIN DIHYDRATE 250 MG: 500 TABLET ORAL at 15:31

## 2025-03-21 RX ADMIN — ATORVASTATIN CALCIUM 80 MG: 40 TABLET, FILM COATED ORAL at 21:30

## 2025-03-21 RX ADMIN — HYDRALAZINE HYDROCHLORIDE 5 MG: 10 TABLET ORAL at 00:34

## 2025-03-21 RX ADMIN — SODIUM CHLORIDE, PRESERVATIVE FREE 10 ML: 5 INJECTION INTRAVENOUS at 08:44

## 2025-03-21 RX ADMIN — METOPROLOL TARTRATE 25 MG: 25 TABLET, FILM COATED ORAL at 21:30

## 2025-03-21 RX ADMIN — POTASSIUM BICARBONATE 40 MEQ: 782 TABLET, EFFERVESCENT ORAL at 15:31

## 2025-03-21 RX ADMIN — SODIUM CHLORIDE: 0.9 INJECTION, SOLUTION INTRAVENOUS at 21:37

## 2025-03-21 RX ADMIN — PIPERACILLIN AND TAZOBACTAM 3375 MG: 3; .375 INJECTION, POWDER, FOR SOLUTION INTRAVENOUS; PARENTERAL at 15:35

## 2025-03-21 RX ADMIN — FUROSEMIDE 20 MG: 40 TABLET ORAL at 08:35

## 2025-03-21 RX ADMIN — MAGNESIUM SULFATE HEPTAHYDRATE 2000 MG: 40 INJECTION, SOLUTION INTRAVENOUS at 18:12

## 2025-03-21 RX ADMIN — PIPERACILLIN AND TAZOBACTAM 3375 MG: 3; .375 INJECTION, POWDER, FOR SOLUTION INTRAVENOUS; PARENTERAL at 06:43

## 2025-03-21 RX ADMIN — HYDRALAZINE HYDROCHLORIDE 5 MG: 10 TABLET ORAL at 15:31

## 2025-03-21 RX ADMIN — PIPERACILLIN AND TAZOBACTAM 3375 MG: 3; .375 INJECTION, POWDER, FOR SOLUTION INTRAVENOUS; PARENTERAL at 21:41

## 2025-03-21 ASSESSMENT — PAIN DESCRIPTION - PAIN TYPE
TYPE: ACUTE PAIN

## 2025-03-21 ASSESSMENT — PAIN DESCRIPTION - ORIENTATION
ORIENTATION: LEFT

## 2025-03-21 ASSESSMENT — PAIN DESCRIPTION - LOCATION
LOCATION: KNEE

## 2025-03-21 ASSESSMENT — PAIN DESCRIPTION - FREQUENCY
FREQUENCY: CONTINUOUS
FREQUENCY: CONTINUOUS

## 2025-03-21 ASSESSMENT — PAIN DESCRIPTION - DESCRIPTORS
DESCRIPTORS: OTHER (COMMENT)
DESCRIPTORS: OTHER (COMMENT);ACHING
DESCRIPTORS: ACHING;OTHER (COMMENT)
DESCRIPTORS: ACHING

## 2025-03-21 ASSESSMENT — PAIN SCALES - GENERAL
PAINLEVEL_OUTOF10: 2
PAINLEVEL_OUTOF10: 0
PAINLEVEL_OUTOF10: 2

## 2025-03-21 ASSESSMENT — PAIN - FUNCTIONAL ASSESSMENT
PAIN_FUNCTIONAL_ASSESSMENT: ACTIVITIES ARE NOT PREVENTED

## 2025-03-21 ASSESSMENT — PAIN DESCRIPTION - ONSET
ONSET: ON-GOING

## 2025-03-22 LAB
25(OH)D3 SERPL-MCNC: <9 NG/ML (ref 30–100)
ALBUMIN SERPL-MCNC: 2.5 G/DL (ref 3.5–5)
ANION GAP SERPL CALC-SCNC: 7 MMOL/L (ref 2–12)
APPEARANCE UR: ABNORMAL
BACTERIA URNS QL MICRO: NEGATIVE /HPF
BILIRUB UR QL: NEGATIVE
BUN SERPL-MCNC: 33 MG/DL (ref 6–20)
BUN/CREAT SERPL: 15 (ref 12–20)
CA-I BLD-MCNC: 8.2 MG/DL (ref 8.5–10.1)
CA-I BLD-MCNC: 8.2 MG/DL (ref 8.5–10.1)
CHLORIDE SERPL-SCNC: 107 MMOL/L (ref 97–108)
CHLORIDE UR-SCNC: <10 MMOL/L
CK SERPL-CCNC: 198 U/L (ref 39–308)
CO2 SERPL-SCNC: 27 MMOL/L (ref 21–32)
COLOR UR: ABNORMAL
CREAT SERPL-MCNC: 2.24 MG/DL (ref 0.7–1.3)
CREAT UR-MCNC: 252 MG/DL
EPITH CASTS URNS QL MICRO: ABNORMAL /LPF
ERYTHROCYTE [DISTWIDTH] IN BLOOD BY AUTOMATED COUNT: 14.2 % (ref 11.5–14.5)
FERRITIN SERPL-MCNC: 751 NG/ML (ref 26–388)
GLUCOSE SERPL-MCNC: 123 MG/DL (ref 65–100)
GLUCOSE UR STRIP.AUTO-MCNC: NEGATIVE MG/DL
HCT VFR BLD AUTO: 26.2 % (ref 36.6–50.3)
HGB BLD-MCNC: 8.3 G/DL (ref 12.1–17)
HGB UR QL STRIP: NEGATIVE
IRON SATN MFR SERPL: 8 % (ref 20–50)
IRON SERPL-MCNC: 17 UG/DL (ref 35–150)
KETONES UR QL STRIP.AUTO: NEGATIVE MG/DL
LEUKOCYTE ESTERASE UR QL STRIP.AUTO: NEGATIVE
MAGNESIUM SERPL-MCNC: 2 MG/DL (ref 1.6–2.4)
MCH RBC QN AUTO: 31 PG (ref 26–34)
MCHC RBC AUTO-ENTMCNC: 31.7 G/DL (ref 30–36.5)
MCV RBC AUTO: 97.8 FL (ref 80–99)
MUCOUS THREADS URNS QL MICRO: ABNORMAL /LPF
NITRITE UR QL STRIP.AUTO: NEGATIVE
NRBC # BLD: 0 K/UL (ref 0–0.01)
NRBC BLD-RTO: 0 PER 100 WBC
PH UR STRIP: 5 (ref 5–8)
PHOSPHATE SERPL-MCNC: 3.3 MG/DL (ref 2.6–4.7)
PLATELET # BLD AUTO: 331 K/UL (ref 150–400)
PMV BLD AUTO: 11.9 FL (ref 8.9–12.9)
POTASSIUM SERPL-SCNC: 3.4 MMOL/L (ref 3.5–5.1)
PROT UR STRIP-MCNC: 100 MG/DL
PROT UR-MCNC: 67 MG/DL (ref 0–11.9)
PROT/CREAT UR-RTO: 0.3
PTH-INTACT SERPL-MCNC: 216.9 PG/ML (ref 18.4–88)
RBC # BLD AUTO: 2.68 M/UL (ref 4.1–5.7)
RBC #/AREA URNS HPF: ABNORMAL /HPF (ref 0–5)
SODIUM SERPL-SCNC: 141 MMOL/L (ref 136–145)
SODIUM UR-SCNC: 9 MMOL/L
SP GR UR REFRACTOMETRY: 1.02 (ref 1–1.03)
TIBC SERPL-MCNC: 214 UG/DL (ref 250–450)
URINE CULTURE IF INDICATED: ABNORMAL
UROBILINOGEN UR QL STRIP.AUTO: 0.1 EU/DL (ref 0.1–1)
WBC # BLD AUTO: 19.2 K/UL (ref 4.1–11.1)
WBC URNS QL MICRO: ABNORMAL /HPF (ref 0–4)

## 2025-03-22 PROCEDURE — 6370000000 HC RX 637 (ALT 250 FOR IP): Performed by: INTERNAL MEDICINE

## 2025-03-22 PROCEDURE — 6370000000 HC RX 637 (ALT 250 FOR IP): Performed by: STUDENT IN AN ORGANIZED HEALTH CARE EDUCATION/TRAINING PROGRAM

## 2025-03-22 PROCEDURE — 2580000003 HC RX 258: Performed by: PHYSICIAN ASSISTANT

## 2025-03-22 PROCEDURE — 6360000002 HC RX W HCPCS: Performed by: INTERNAL MEDICINE

## 2025-03-22 PROCEDURE — 82550 ASSAY OF CK (CPK): CPT

## 2025-03-22 PROCEDURE — 2060000000 HC ICU INTERMEDIATE R&B

## 2025-03-22 PROCEDURE — 94640 AIRWAY INHALATION TREATMENT: CPT

## 2025-03-22 PROCEDURE — 80069 RENAL FUNCTION PANEL: CPT

## 2025-03-22 PROCEDURE — 2580000003 HC RX 258: Performed by: INTERNAL MEDICINE

## 2025-03-22 PROCEDURE — 83735 ASSAY OF MAGNESIUM: CPT

## 2025-03-22 PROCEDURE — 82570 ASSAY OF URINE CREATININE: CPT

## 2025-03-22 PROCEDURE — 84156 ASSAY OF PROTEIN URINE: CPT

## 2025-03-22 PROCEDURE — 6370000000 HC RX 637 (ALT 250 FOR IP)

## 2025-03-22 PROCEDURE — 82306 VITAMIN D 25 HYDROXY: CPT

## 2025-03-22 PROCEDURE — 84300 ASSAY OF URINE SODIUM: CPT

## 2025-03-22 PROCEDURE — 2700000000 HC OXYGEN THERAPY PER DAY

## 2025-03-22 PROCEDURE — 2500000003 HC RX 250 WO HCPCS: Performed by: STUDENT IN AN ORGANIZED HEALTH CARE EDUCATION/TRAINING PROGRAM

## 2025-03-22 PROCEDURE — 87040 BLOOD CULTURE FOR BACTERIA: CPT

## 2025-03-22 PROCEDURE — 83540 ASSAY OF IRON: CPT

## 2025-03-22 PROCEDURE — 85027 COMPLETE CBC AUTOMATED: CPT

## 2025-03-22 PROCEDURE — 83970 ASSAY OF PARATHORMONE: CPT

## 2025-03-22 PROCEDURE — 82436 ASSAY OF URINE CHLORIDE: CPT

## 2025-03-22 PROCEDURE — 81001 URINALYSIS AUTO W/SCOPE: CPT

## 2025-03-22 PROCEDURE — 2580000003 HC RX 258: Performed by: STUDENT IN AN ORGANIZED HEALTH CARE EDUCATION/TRAINING PROGRAM

## 2025-03-22 PROCEDURE — 6360000002 HC RX W HCPCS: Performed by: PHYSICIAN ASSISTANT

## 2025-03-22 PROCEDURE — 94761 N-INVAS EAR/PLS OXIMETRY MLT: CPT

## 2025-03-22 PROCEDURE — 82728 ASSAY OF FERRITIN: CPT

## 2025-03-22 RX ORDER — IPRATROPIUM BROMIDE AND ALBUTEROL SULFATE 2.5; .5 MG/3ML; MG/3ML
1 SOLUTION RESPIRATORY (INHALATION)
Status: DISCONTINUED | OUTPATIENT
Start: 2025-03-22 | End: 2025-03-31

## 2025-03-22 RX ORDER — BUDESONIDE AND FORMOTEROL FUMARATE DIHYDRATE 160; 4.5 UG/1; UG/1
2 AEROSOL RESPIRATORY (INHALATION)
Status: DISCONTINUED | OUTPATIENT
Start: 2025-03-22 | End: 2025-03-22

## 2025-03-22 RX ORDER — CALCITRIOL 0.25 UG/1
0.25 CAPSULE, LIQUID FILLED ORAL DAILY
Status: DISCONTINUED | OUTPATIENT
Start: 2025-03-23 | End: 2025-04-03 | Stop reason: HOSPADM

## 2025-03-22 RX ORDER — ERGOCALCIFEROL 1.25 MG/1
50000 CAPSULE, LIQUID FILLED ORAL WEEKLY
Status: DISCONTINUED | OUTPATIENT
Start: 2025-03-22 | End: 2025-04-03 | Stop reason: HOSPADM

## 2025-03-22 RX ORDER — BUDESONIDE 0.5 MG/2ML
0.5 INHALANT ORAL
Status: DISCONTINUED | OUTPATIENT
Start: 2025-03-22 | End: 2025-04-03 | Stop reason: HOSPADM

## 2025-03-22 RX ORDER — SODIUM FERRIC GLUCONATE COMPLEX IN SUCROSE 12.5 MG/ML
125 INJECTION INTRAVENOUS DAILY
Status: DISCONTINUED | OUTPATIENT
Start: 2025-03-22 | End: 2025-03-22 | Stop reason: SDUPTHER

## 2025-03-22 RX ORDER — METHYLPREDNISOLONE SODIUM SUCCINATE 40 MG/ML
40 INJECTION INTRAMUSCULAR; INTRAVENOUS EVERY 8 HOURS
Status: COMPLETED | OUTPATIENT
Start: 2025-03-22 | End: 2025-03-23

## 2025-03-22 RX ADMIN — PANTOPRAZOLE SODIUM 40 MG: 40 TABLET, DELAYED RELEASE ORAL at 14:47

## 2025-03-22 RX ADMIN — SODIUM CHLORIDE 125 MG: 9 INJECTION, SOLUTION INTRAVENOUS at 22:17

## 2025-03-22 RX ADMIN — PIPERACILLIN AND TAZOBACTAM 3375 MG: 3; .375 INJECTION, POWDER, FOR SOLUTION INTRAVENOUS; PARENTERAL at 05:29

## 2025-03-22 RX ADMIN — SODIUM CHLORIDE, PRESERVATIVE FREE 5 ML: 5 INJECTION INTRAVENOUS at 10:47

## 2025-03-22 RX ADMIN — PANTOPRAZOLE SODIUM 40 MG: 40 TABLET, DELAYED RELEASE ORAL at 07:04

## 2025-03-22 RX ADMIN — ASPIRIN 81 MG: 81 TABLET, COATED ORAL at 10:44

## 2025-03-22 RX ADMIN — ERGOCALCIFEROL 50000 UNITS: 1.25 CAPSULE ORAL at 21:41

## 2025-03-22 RX ADMIN — METOPROLOL TARTRATE 25 MG: 25 TABLET, FILM COATED ORAL at 10:44

## 2025-03-22 RX ADMIN — SODIUM CHLORIDE, PRESERVATIVE FREE 10 ML: 5 INJECTION INTRAVENOUS at 21:42

## 2025-03-22 RX ADMIN — METHYLPREDNISOLONE SODIUM SUCCINATE 40 MG: 40 INJECTION INTRAMUSCULAR; INTRAVENOUS at 17:21

## 2025-03-22 RX ADMIN — METOPROLOL TARTRATE 25 MG: 25 TABLET, FILM COATED ORAL at 21:41

## 2025-03-22 RX ADMIN — Medication 2 PUFF: at 10:55

## 2025-03-22 RX ADMIN — BUDESONIDE INHALATION 500 MCG: 0.5 SUSPENSION RESPIRATORY (INHALATION) at 21:23

## 2025-03-22 RX ADMIN — SODIUM CHLORIDE: 0.9 INJECTION, SOLUTION INTRAVENOUS at 22:15

## 2025-03-22 RX ADMIN — IPRATROPIUM BROMIDE AND ALBUTEROL SULFATE 1 DOSE: 2.5; .5 SOLUTION RESPIRATORY (INHALATION) at 10:55

## 2025-03-22 RX ADMIN — METHYLPREDNISOLONE SODIUM SUCCINATE 40 MG: 40 INJECTION INTRAMUSCULAR; INTRAVENOUS at 10:45

## 2025-03-22 RX ADMIN — IPRATROPIUM BROMIDE AND ALBUTEROL SULFATE 1 DOSE: 2.5; .5 SOLUTION RESPIRATORY (INHALATION) at 13:37

## 2025-03-22 RX ADMIN — FUROSEMIDE 20 MG: 40 TABLET ORAL at 10:44

## 2025-03-22 RX ADMIN — ATORVASTATIN CALCIUM 80 MG: 40 TABLET, FILM COATED ORAL at 21:41

## 2025-03-22 RX ADMIN — POTASSIUM BICARBONATE 40 MEQ: 782 TABLET, EFFERVESCENT ORAL at 17:20

## 2025-03-22 RX ADMIN — AZITHROMYCIN DIHYDRATE 250 MG: 500 TABLET ORAL at 10:44

## 2025-03-22 RX ADMIN — PIPERACILLIN AND TAZOBACTAM 3375 MG: 3; .375 INJECTION, POWDER, FOR SOLUTION INTRAVENOUS; PARENTERAL at 23:41

## 2025-03-22 RX ADMIN — PIPERACILLIN AND TAZOBACTAM 3375 MG: 3; .375 INJECTION, POWDER, FOR SOLUTION INTRAVENOUS; PARENTERAL at 14:50

## 2025-03-22 RX ADMIN — IPRATROPIUM BROMIDE AND ALBUTEROL SULFATE 1 DOSE: 2.5; .5 SOLUTION RESPIRATORY (INHALATION) at 21:23

## 2025-03-23 ENCOUNTER — APPOINTMENT (OUTPATIENT)
Facility: HOSPITAL | Age: 80
DRG: 380 | End: 2025-03-23
Attending: STUDENT IN AN ORGANIZED HEALTH CARE EDUCATION/TRAINING PROGRAM
Payer: MEDICARE

## 2025-03-23 LAB
ALBUMIN SERPL-MCNC: 2.6 G/DL (ref 3.5–5)
ANION GAP SERPL CALC-SCNC: 9 MMOL/L (ref 2–12)
BACTERIA SPEC CULT: NORMAL
BASOPHILS # BLD: 0.02 K/UL (ref 0–0.1)
BASOPHILS NFR BLD: 0.1 % (ref 0–1)
BUN SERPL-MCNC: 46 MG/DL (ref 6–20)
BUN/CREAT SERPL: 17 (ref 12–20)
CA-I BLD-MCNC: 9.2 MG/DL (ref 8.5–10.1)
CHLORIDE SERPL-SCNC: 105 MMOL/L (ref 97–108)
CO2 SERPL-SCNC: 25 MMOL/L (ref 21–32)
CREAT SERPL-MCNC: 2.65 MG/DL (ref 0.7–1.3)
DIFFERENTIAL METHOD BLD: ABNORMAL
EOSINOPHIL # BLD: 0 K/UL (ref 0–0.4)
EOSINOPHIL NFR BLD: 0 % (ref 0–7)
ERYTHROCYTE [DISTWIDTH] IN BLOOD BY AUTOMATED COUNT: 14.3 % (ref 11.5–14.5)
GLUCOSE SERPL-MCNC: 191 MG/DL (ref 65–100)
HCT VFR BLD AUTO: 27.5 % (ref 36.6–50.3)
HGB BLD-MCNC: 8.5 G/DL (ref 12.1–17)
IMM GRANULOCYTES # BLD AUTO: 0.15 K/UL (ref 0–0.04)
IMM GRANULOCYTES NFR BLD AUTO: 0.8 % (ref 0–0.5)
LYMPHOCYTES # BLD: 0.49 K/UL (ref 0.8–3.5)
LYMPHOCYTES NFR BLD: 2.7 % (ref 12–49)
Lab: NORMAL
MAGNESIUM SERPL-MCNC: 2.2 MG/DL (ref 1.6–2.4)
MCH RBC QN AUTO: 32 PG (ref 26–34)
MCHC RBC AUTO-ENTMCNC: 30.9 G/DL (ref 30–36.5)
MCV RBC AUTO: 103.4 FL (ref 80–99)
MONOCYTES # BLD: 0.93 K/UL (ref 0–1)
MONOCYTES NFR BLD: 5.1 % (ref 5–13)
NEUTS SEG # BLD: 16.82 K/UL (ref 1.8–8)
NEUTS SEG NFR BLD: 91.3 % (ref 32–75)
NRBC # BLD: 0.02 K/UL (ref 0–0.01)
NRBC BLD-RTO: 0.1 PER 100 WBC
PHOSPHATE SERPL-MCNC: 4.5 MG/DL (ref 2.6–4.7)
PHOSPHATE SERPL-MCNC: 4.6 MG/DL (ref 2.6–4.7)
PLATELET # BLD AUTO: 355 K/UL (ref 150–400)
PMV BLD AUTO: 12 FL (ref 8.9–12.9)
POTASSIUM SERPL-SCNC: 3.6 MMOL/L (ref 3.5–5.1)
RBC # BLD AUTO: 2.66 M/UL (ref 4.1–5.7)
SODIUM SERPL-SCNC: 139 MMOL/L (ref 136–145)
WBC # BLD AUTO: 18.4 K/UL (ref 4.1–11.1)

## 2025-03-23 PROCEDURE — 6360000002 HC RX W HCPCS: Performed by: INTERNAL MEDICINE

## 2025-03-23 PROCEDURE — 94640 AIRWAY INHALATION TREATMENT: CPT

## 2025-03-23 PROCEDURE — 84100 ASSAY OF PHOSPHORUS: CPT

## 2025-03-23 PROCEDURE — 6370000000 HC RX 637 (ALT 250 FOR IP): Performed by: INTERNAL MEDICINE

## 2025-03-23 PROCEDURE — 2500000003 HC RX 250 WO HCPCS: Performed by: STUDENT IN AN ORGANIZED HEALTH CARE EDUCATION/TRAINING PROGRAM

## 2025-03-23 PROCEDURE — 6360000002 HC RX W HCPCS: Performed by: PHYSICIAN ASSISTANT

## 2025-03-23 PROCEDURE — 36415 COLL VENOUS BLD VENIPUNCTURE: CPT

## 2025-03-23 PROCEDURE — 2580000003 HC RX 258: Performed by: INTERNAL MEDICINE

## 2025-03-23 PROCEDURE — 2060000000 HC ICU INTERMEDIATE R&B

## 2025-03-23 PROCEDURE — 94660 CPAP INITIATION&MGMT: CPT

## 2025-03-23 PROCEDURE — 94761 N-INVAS EAR/PLS OXIMETRY MLT: CPT

## 2025-03-23 PROCEDURE — 71045 X-RAY EXAM CHEST 1 VIEW: CPT

## 2025-03-23 PROCEDURE — 6370000000 HC RX 637 (ALT 250 FOR IP): Performed by: STUDENT IN AN ORGANIZED HEALTH CARE EDUCATION/TRAINING PROGRAM

## 2025-03-23 PROCEDURE — 51798 US URINE CAPACITY MEASURE: CPT

## 2025-03-23 PROCEDURE — 83735 ASSAY OF MAGNESIUM: CPT

## 2025-03-23 PROCEDURE — 87070 CULTURE OTHR SPECIMN AEROBIC: CPT

## 2025-03-23 PROCEDURE — 87205 SMEAR GRAM STAIN: CPT

## 2025-03-23 PROCEDURE — 85025 COMPLETE CBC W/AUTO DIFF WBC: CPT

## 2025-03-23 PROCEDURE — 97530 THERAPEUTIC ACTIVITIES: CPT

## 2025-03-23 PROCEDURE — 2700000000 HC OXYGEN THERAPY PER DAY

## 2025-03-23 PROCEDURE — 2580000003 HC RX 258: Performed by: PHYSICIAN ASSISTANT

## 2025-03-23 PROCEDURE — 6370000000 HC RX 637 (ALT 250 FOR IP): Performed by: PHYSICIAN ASSISTANT

## 2025-03-23 PROCEDURE — 93005 ELECTROCARDIOGRAM TRACING: CPT

## 2025-03-23 PROCEDURE — 80069 RENAL FUNCTION PANEL: CPT

## 2025-03-23 PROCEDURE — 74018 RADEX ABDOMEN 1 VIEW: CPT

## 2025-03-23 RX ORDER — LORAZEPAM 1 MG/1
1 TABLET ORAL EVERY 4 HOURS PRN
Status: DISCONTINUED | OUTPATIENT
Start: 2025-03-23 | End: 2025-04-03 | Stop reason: HOSPADM

## 2025-03-23 RX ORDER — SIMETHICONE 40MG/0.6ML
40 SUSPENSION, DROPS(FINAL DOSAGE FORM)(ML) ORAL EVERY 6 HOURS PRN
Status: DISCONTINUED | OUTPATIENT
Start: 2025-03-23 | End: 2025-04-03 | Stop reason: HOSPADM

## 2025-03-23 RX ORDER — SODIUM CHLORIDE AND POTASSIUM CHLORIDE 150; 900 MG/100ML; MG/100ML
INJECTION, SOLUTION INTRAVENOUS CONTINUOUS
Status: DISCONTINUED | OUTPATIENT
Start: 2025-03-23 | End: 2025-03-23

## 2025-03-23 RX ORDER — METOPROLOL TARTRATE 1 MG/ML
5 INJECTION, SOLUTION INTRAVENOUS EVERY 6 HOURS PRN
Status: DISCONTINUED | OUTPATIENT
Start: 2025-03-23 | End: 2025-04-03 | Stop reason: HOSPADM

## 2025-03-23 RX ORDER — FUROSEMIDE 10 MG/ML
80 INJECTION INTRAMUSCULAR; INTRAVENOUS ONCE
Status: COMPLETED | OUTPATIENT
Start: 2025-03-23 | End: 2025-03-23

## 2025-03-23 RX ADMIN — METOPROLOL TARTRATE 5 MG: 5 INJECTION INTRAVENOUS at 19:13

## 2025-03-23 RX ADMIN — SODIUM CHLORIDE, PRESERVATIVE FREE 10 ML: 5 INJECTION INTRAVENOUS at 22:46

## 2025-03-23 RX ADMIN — METHYLPREDNISOLONE SODIUM SUCCINATE 40 MG: 40 INJECTION INTRAMUSCULAR; INTRAVENOUS at 04:54

## 2025-03-23 RX ADMIN — ATORVASTATIN CALCIUM 80 MG: 40 TABLET, FILM COATED ORAL at 22:58

## 2025-03-23 RX ADMIN — SODIUM CHLORIDE 125 MG: 9 INJECTION, SOLUTION INTRAVENOUS at 11:07

## 2025-03-23 RX ADMIN — METOPROLOL TARTRATE 25 MG: 25 TABLET, FILM COATED ORAL at 22:45

## 2025-03-23 RX ADMIN — PIPERACILLIN AND TAZOBACTAM 3375 MG: 3; .375 INJECTION, POWDER, FOR SOLUTION INTRAVENOUS; PARENTERAL at 15:51

## 2025-03-23 RX ADMIN — SODIUM CHLORIDE AND POTASSIUM CHLORIDE: .9; .15 SOLUTION INTRAVENOUS at 15:47

## 2025-03-23 RX ADMIN — BUDESONIDE INHALATION 500 MCG: 0.5 SUSPENSION RESPIRATORY (INHALATION) at 07:34

## 2025-03-23 RX ADMIN — IPRATROPIUM BROMIDE AND ALBUTEROL SULFATE 1 DOSE: 2.5; .5 SOLUTION RESPIRATORY (INHALATION) at 07:34

## 2025-03-23 RX ADMIN — CALCITRIOL CAPSULES 0.25 MCG 0.25 MCG: 0.25 CAPSULE ORAL at 09:30

## 2025-03-23 RX ADMIN — PIPERACILLIN AND TAZOBACTAM 3375 MG: 3; .375 INJECTION, POWDER, FOR SOLUTION INTRAVENOUS; PARENTERAL at 05:45

## 2025-03-23 RX ADMIN — IPRATROPIUM BROMIDE AND ALBUTEROL SULFATE 1 DOSE: 2.5; .5 SOLUTION RESPIRATORY (INHALATION) at 14:49

## 2025-03-23 RX ADMIN — IPRATROPIUM BROMIDE AND ALBUTEROL SULFATE 1 DOSE: 2.5; .5 SOLUTION RESPIRATORY (INHALATION) at 04:29

## 2025-03-23 RX ADMIN — PANTOPRAZOLE SODIUM 40 MG: 40 TABLET, DELAYED RELEASE ORAL at 15:52

## 2025-03-23 RX ADMIN — LORAZEPAM 1 MG: 1 TABLET ORAL at 22:45

## 2025-03-23 RX ADMIN — BUDESONIDE INHALATION 500 MCG: 0.5 SUSPENSION RESPIRATORY (INHALATION) at 19:52

## 2025-03-23 RX ADMIN — SODIUM CHLORIDE, PRESERVATIVE FREE 10 ML: 5 INJECTION INTRAVENOUS at 10:20

## 2025-03-23 RX ADMIN — PIPERACILLIN AND TAZOBACTAM 3375 MG: 3; .375 INJECTION, POWDER, FOR SOLUTION INTRAVENOUS; PARENTERAL at 23:50

## 2025-03-23 RX ADMIN — ASPIRIN 81 MG: 81 TABLET, COATED ORAL at 09:30

## 2025-03-23 RX ADMIN — AZITHROMYCIN DIHYDRATE 250 MG: 500 TABLET ORAL at 09:30

## 2025-03-23 RX ADMIN — IPRATROPIUM BROMIDE AND ALBUTEROL SULFATE 1 DOSE: 2.5; .5 SOLUTION RESPIRATORY (INHALATION) at 19:52

## 2025-03-23 RX ADMIN — PANTOPRAZOLE SODIUM 40 MG: 40 TABLET, DELAYED RELEASE ORAL at 07:46

## 2025-03-23 RX ADMIN — FUROSEMIDE 80 MG: 10 INJECTION, SOLUTION INTRAMUSCULAR; INTRAVENOUS at 22:28

## 2025-03-23 RX ADMIN — METOPROLOL TARTRATE 25 MG: 25 TABLET, FILM COATED ORAL at 09:30

## 2025-03-24 ENCOUNTER — APPOINTMENT (OUTPATIENT)
Facility: HOSPITAL | Age: 80
DRG: 380 | End: 2025-03-24
Attending: STUDENT IN AN ORGANIZED HEALTH CARE EDUCATION/TRAINING PROGRAM
Payer: MEDICARE

## 2025-03-24 LAB
ALBUMIN SERPL-MCNC: 2.6 G/DL (ref 3.5–5)
ANION GAP SERPL CALC-SCNC: 8 MMOL/L (ref 2–12)
ARTERIAL PATENCY WRIST A: YES
ARTERIAL PATENCY WRIST A: YES
BASE DEFICIT BLDA-SCNC: 0.1 MMOL/L
BASE DEFICIT BLDA-SCNC: 1.3 MMOL/L
BASOPHILS # BLD: 0.02 K/UL (ref 0–0.1)
BASOPHILS NFR BLD: 0.1 % (ref 0–1)
BDY SITE: ABNORMAL
BDY SITE: ABNORMAL
BODY TEMPERATURE: 98
BODY TEMPERATURE: 98.6
BUN SERPL-MCNC: 58 MG/DL (ref 6–20)
BUN/CREAT SERPL: 17 (ref 12–20)
CA-I BLD-MCNC: 8.9 MG/DL (ref 8.5–10.1)
CHLORIDE SERPL-SCNC: 108 MMOL/L (ref 97–108)
CO2 SERPL-SCNC: 25 MMOL/L (ref 21–32)
COHGB MFR BLD: 0.3 % (ref 1–2)
COHGB MFR BLD: 0.3 % (ref 1–2)
CREAT SERPL-MCNC: 3.51 MG/DL (ref 0.7–1.3)
DIFFERENTIAL METHOD BLD: ABNORMAL
EKG ATRIAL RATE: 104 BPM
EKG DIAGNOSIS: NORMAL
EKG Q-T INTERVAL: 336 MS
EKG QRS DURATION: 92 MS
EKG QTC CALCULATION (BAZETT): 468 MS
EKG R AXIS: 89 DEGREES
EKG T AXIS: 161 DEGREES
EKG VENTRICULAR RATE: 117 BPM
EOSINOPHIL # BLD: 0 K/UL (ref 0–0.4)
EOSINOPHIL NFR BLD: 0 % (ref 0–7)
ERYTHROCYTE [DISTWIDTH] IN BLOOD BY AUTOMATED COUNT: 14.1 % (ref 11.5–14.5)
FIO2 ON VENT: 60 %
FIO2 ON VENT: 80 %
FLUAV RNA SPEC QL NAA+PROBE: NOT DETECTED
FLUBV RNA SPEC QL NAA+PROBE: NOT DETECTED
GAS FLOW.O2 O2 DELIVERY SYS: 10 L/MIN
GAS FLOW.O2 O2 DELIVERY SYS: 50 L/MIN
GLUCOSE SERPL-MCNC: 131 MG/DL (ref 65–100)
HCO3 BLDA-SCNC: 22 MMOL/L (ref 22–26)
HCO3 BLDA-SCNC: 23 MMOL/L (ref 22–26)
HCT VFR BLD AUTO: 25.8 % (ref 36.6–50.3)
HGB BLD-MCNC: 8.1 G/DL (ref 12.1–17)
IMM GRANULOCYTES # BLD AUTO: 0.17 K/UL (ref 0–0.04)
IMM GRANULOCYTES NFR BLD AUTO: 0.8 % (ref 0–0.5)
LYMPHOCYTES # BLD: 0.49 K/UL (ref 0.8–3.5)
LYMPHOCYTES NFR BLD: 2.2 % (ref 12–49)
MCH RBC QN AUTO: 31.6 PG (ref 26–34)
MCHC RBC AUTO-ENTMCNC: 31.4 G/DL (ref 30–36.5)
MCV RBC AUTO: 100.8 FL (ref 80–99)
METHGB MFR BLD: 0.3 % (ref 0–1.4)
METHGB MFR BLD: 0.4 % (ref 0–1.4)
MONOCYTES # BLD: 1.57 K/UL (ref 0–1)
MONOCYTES NFR BLD: 7.1 % (ref 5–13)
NEUTS SEG # BLD: 19.99 K/UL (ref 1.8–8)
NEUTS SEG NFR BLD: 89.8 % (ref 32–75)
NRBC # BLD: 0.05 K/UL (ref 0–0.01)
NRBC BLD-RTO: 0.2 PER 100 WBC
OXYHGB MFR BLD: 91.8 % (ref 95–99)
OXYHGB MFR BLD: 95.3 % (ref 95–99)
PCO2 BLDA: 29 MMHG (ref 35–45)
PCO2 BLDA: 30 MMHG (ref 35–45)
PERFORMED BY:: ABNORMAL
PERFORMED BY:: ABNORMAL
PH BLDA: 7.48 (ref 7.35–7.45)
PH BLDA: 7.5 (ref 7.35–7.45)
PHOSPHATE SERPL-MCNC: 6.2 MG/DL (ref 2.6–4.7)
PLATELET # BLD AUTO: 406 K/UL (ref 150–400)
PMV BLD AUTO: 12 FL (ref 8.9–12.9)
PO2 BLDA: 70 MMHG (ref 80–100)
PO2 BLDA: 87 MMHG (ref 80–100)
POTASSIUM SERPL-SCNC: 4 MMOL/L (ref 3.5–5.1)
PROCALCITONIN SERPL-MCNC: 0.53 NG/ML
RBC # BLD AUTO: 2.56 M/UL (ref 4.1–5.7)
SAO2 % BLD: 92 % (ref 95–99)
SAO2 % BLD: 96 % (ref 95–99)
SAO2% DEVICE SAO2% SENSOR NAME: ABNORMAL
SAO2% DEVICE SAO2% SENSOR NAME: ABNORMAL
SARS-COV-2 RNA RESP QL NAA+PROBE: NOT DETECTED
SODIUM SERPL-SCNC: 141 MMOL/L (ref 136–145)
SPECIMEN SITE: ABNORMAL
SPECIMEN SITE: ABNORMAL
WBC # BLD AUTO: 22.2 K/UL (ref 4.1–11.1)

## 2025-03-24 PROCEDURE — 82803 BLOOD GASES ANY COMBINATION: CPT

## 2025-03-24 PROCEDURE — 80069 RENAL FUNCTION PANEL: CPT

## 2025-03-24 PROCEDURE — 6360000002 HC RX W HCPCS: Performed by: PHYSICIAN ASSISTANT

## 2025-03-24 PROCEDURE — 0W993ZZ DRAINAGE OF RIGHT PLEURAL CAVITY, PERCUTANEOUS APPROACH: ICD-10-PCS | Performed by: STUDENT IN AN ORGANIZED HEALTH CARE EDUCATION/TRAINING PROGRAM

## 2025-03-24 PROCEDURE — 2000000000 HC ICU R&B

## 2025-03-24 PROCEDURE — 94761 N-INVAS EAR/PLS OXIMETRY MLT: CPT

## 2025-03-24 PROCEDURE — 87636 SARSCOV2 & INF A&B AMP PRB: CPT

## 2025-03-24 PROCEDURE — 6370000000 HC RX 637 (ALT 250 FOR IP): Performed by: INTERNAL MEDICINE

## 2025-03-24 PROCEDURE — 2580000003 HC RX 258: Performed by: INTERNAL MEDICINE

## 2025-03-24 PROCEDURE — 6370000000 HC RX 637 (ALT 250 FOR IP): Performed by: PHYSICIAN ASSISTANT

## 2025-03-24 PROCEDURE — 2580000003 HC RX 258: Performed by: PHYSICIAN ASSISTANT

## 2025-03-24 PROCEDURE — 84145 PROCALCITONIN (PCT): CPT

## 2025-03-24 PROCEDURE — 6360000002 HC RX W HCPCS: Performed by: INTERNAL MEDICINE

## 2025-03-24 PROCEDURE — 0W9B3ZZ DRAINAGE OF LEFT PLEURAL CAVITY, PERCUTANEOUS APPROACH: ICD-10-PCS | Performed by: STUDENT IN AN ORGANIZED HEALTH CARE EDUCATION/TRAINING PROGRAM

## 2025-03-24 PROCEDURE — 71045 X-RAY EXAM CHEST 1 VIEW: CPT

## 2025-03-24 PROCEDURE — 2500000003 HC RX 250 WO HCPCS: Performed by: STUDENT IN AN ORGANIZED HEALTH CARE EDUCATION/TRAINING PROGRAM

## 2025-03-24 PROCEDURE — 36415 COLL VENOUS BLD VENIPUNCTURE: CPT

## 2025-03-24 PROCEDURE — 6360000002 HC RX W HCPCS: Performed by: STUDENT IN AN ORGANIZED HEALTH CARE EDUCATION/TRAINING PROGRAM

## 2025-03-24 PROCEDURE — 94660 CPAP INITIATION&MGMT: CPT

## 2025-03-24 PROCEDURE — 2700000000 HC OXYGEN THERAPY PER DAY

## 2025-03-24 PROCEDURE — 6370000000 HC RX 637 (ALT 250 FOR IP): Performed by: STUDENT IN AN ORGANIZED HEALTH CARE EDUCATION/TRAINING PROGRAM

## 2025-03-24 PROCEDURE — 6360000002 HC RX W HCPCS

## 2025-03-24 PROCEDURE — 94640 AIRWAY INHALATION TREATMENT: CPT

## 2025-03-24 PROCEDURE — 85025 COMPLETE CBC W/AUTO DIFF WBC: CPT

## 2025-03-24 PROCEDURE — 36600 WITHDRAWAL OF ARTERIAL BLOOD: CPT

## 2025-03-24 RX ORDER — FUROSEMIDE 10 MG/ML
80 INJECTION INTRAMUSCULAR; INTRAVENOUS 2 TIMES DAILY
Status: DISCONTINUED | OUTPATIENT
Start: 2025-03-24 | End: 2025-03-24

## 2025-03-24 RX ORDER — FUROSEMIDE 10 MG/ML
40 INJECTION INTRAMUSCULAR; INTRAVENOUS 2 TIMES DAILY
Status: DISCONTINUED | OUTPATIENT
Start: 2025-03-24 | End: 2025-03-24

## 2025-03-24 RX ORDER — FUROSEMIDE 10 MG/ML
20 INJECTION INTRAMUSCULAR; INTRAVENOUS 2 TIMES DAILY
Status: DISCONTINUED | OUTPATIENT
Start: 2025-03-25 | End: 2025-03-25

## 2025-03-24 RX ORDER — HEPARIN SODIUM 5000 [USP'U]/ML
5000 INJECTION, SOLUTION INTRAVENOUS; SUBCUTANEOUS EVERY 8 HOURS SCHEDULED
Status: DISCONTINUED | OUTPATIENT
Start: 2025-03-24 | End: 2025-04-03 | Stop reason: HOSPADM

## 2025-03-24 RX ORDER — FUROSEMIDE 10 MG/ML
40 INJECTION INTRAMUSCULAR; INTRAVENOUS DAILY
Status: DISCONTINUED | OUTPATIENT
Start: 2025-03-24 | End: 2025-03-24

## 2025-03-24 RX ORDER — HYDROXYZINE HYDROCHLORIDE 25 MG/ML
25 INJECTION, SOLUTION INTRAMUSCULAR EVERY 6 HOURS PRN
Status: DISCONTINUED | OUTPATIENT
Start: 2025-03-24 | End: 2025-03-29

## 2025-03-24 RX ADMIN — SODIUM CHLORIDE 125 MG: 9 INJECTION, SOLUTION INTRAVENOUS at 14:02

## 2025-03-24 RX ADMIN — HEPARIN SODIUM 5000 UNITS: 5000 INJECTION INTRAVENOUS; SUBCUTANEOUS at 22:18

## 2025-03-24 RX ADMIN — METOPROLOL TARTRATE 25 MG: 25 TABLET, FILM COATED ORAL at 20:27

## 2025-03-24 RX ADMIN — IPRATROPIUM BROMIDE AND ALBUTEROL SULFATE 1 DOSE: 2.5; .5 SOLUTION RESPIRATORY (INHALATION) at 02:49

## 2025-03-24 RX ADMIN — CALCITRIOL CAPSULES 0.25 MCG 0.25 MCG: 0.25 CAPSULE ORAL at 09:19

## 2025-03-24 RX ADMIN — AZITHROMYCIN DIHYDRATE 250 MG: 500 TABLET ORAL at 09:19

## 2025-03-24 RX ADMIN — ASPIRIN 81 MG: 81 TABLET, COATED ORAL at 09:19

## 2025-03-24 RX ADMIN — LORAZEPAM 1 MG: 1 TABLET ORAL at 13:00

## 2025-03-24 RX ADMIN — PIPERACILLIN AND TAZOBACTAM 3375 MG: 3; .375 INJECTION, POWDER, FOR SOLUTION INTRAVENOUS; PARENTERAL at 14:40

## 2025-03-24 RX ADMIN — FUROSEMIDE 80 MG: 10 INJECTION, SOLUTION INTRAMUSCULAR; INTRAVENOUS at 14:41

## 2025-03-24 RX ADMIN — METOPROLOL TARTRATE 25 MG: 25 TABLET, FILM COATED ORAL at 09:21

## 2025-03-24 RX ADMIN — PIPERACILLIN AND TAZOBACTAM 3375 MG: 3; .375 INJECTION, POWDER, FOR SOLUTION INTRAVENOUS; PARENTERAL at 22:14

## 2025-03-24 RX ADMIN — PIPERACILLIN AND TAZOBACTAM 3375 MG: 3; .375 INJECTION, POWDER, FOR SOLUTION INTRAVENOUS; PARENTERAL at 09:35

## 2025-03-24 RX ADMIN — BUDESONIDE INHALATION 500 MCG: 0.5 SUSPENSION RESPIRATORY (INHALATION) at 07:34

## 2025-03-24 RX ADMIN — SODIUM CHLORIDE, PRESERVATIVE FREE 10 ML: 5 INJECTION INTRAVENOUS at 09:38

## 2025-03-24 RX ADMIN — IPRATROPIUM BROMIDE AND ALBUTEROL SULFATE 1 DOSE: 2.5; .5 SOLUTION RESPIRATORY (INHALATION) at 12:00

## 2025-03-24 RX ADMIN — SODIUM CHLORIDE, PRESERVATIVE FREE 10 ML: 5 INJECTION INTRAVENOUS at 20:27

## 2025-03-24 RX ADMIN — ATORVASTATIN CALCIUM 80 MG: 40 TABLET, FILM COATED ORAL at 20:26

## 2025-03-24 RX ADMIN — FUROSEMIDE 40 MG: 10 INJECTION, SOLUTION INTRAMUSCULAR; INTRAVENOUS at 09:19

## 2025-03-24 RX ADMIN — BUDESONIDE INHALATION 500 MCG: 0.5 SUSPENSION RESPIRATORY (INHALATION) at 21:14

## 2025-03-24 RX ADMIN — IPRATROPIUM BROMIDE AND ALBUTEROL SULFATE 1 DOSE: 2.5; .5 SOLUTION RESPIRATORY (INHALATION) at 21:14

## 2025-03-24 RX ADMIN — IPRATROPIUM BROMIDE AND ALBUTEROL SULFATE 1 DOSE: 2.5; .5 SOLUTION RESPIRATORY (INHALATION) at 07:34

## 2025-03-24 ASSESSMENT — PAIN SCALES - GENERAL: PAINLEVEL_OUTOF10: 0

## 2025-03-24 NOTE — PROCEDURES
PROCEDURE NOTE  Date: 3/24/2025   Name: Lei Jaimes  YOB: 1945    Procedures      Ultrasound Guided Thoracentesis    Indications: Left pleural effusion    Risks of procedure including pneumothorax, hemorrhage, infections and adverse drug reaction were discussed. Informed consent was obtained and a time out was performed prior to the procedure.    Procedure Details: The patient was placed in an upright seated position. Bedside ultrasonography was used to identify the diaphragm, lung, and pleural effusion. The appropriate procedural site was marked accordingly. The procedural site was then cleaned and draped in sterile fashion. Local anesthesia was applied to the skin, subcutaneous tissue, and parietal pleura with 10 cc of 1% lidocaine without epinephrine. A 5 Fr Yueh catheter was used to access the target pleural space in the 8th intercostal space. Using a combination of syringes and evacuated suction bottles, a total of 1600 cc of serosanguinous pleural fluid was removed. The Yueh catheter was then removed and a sterile dressing applied to the site.    Complications: None      Dr. Skip Vigil  Intensivist

## 2025-03-24 NOTE — SIGNIFICANT EVENT
Overnight coverage:    79-year-old male admitted on 3/13/2025 with worsening dyspnea and acute respiratory failure with hypoxia.  Patient has progressed to 10 L of oxygen via nasal cannula.  Chest x-ray reveals pulmonary edema and large pleural effusions consistent with worsening heart failure.  IV fluids were discontinued even in the presence of worsening renal function.  80 of Lasix intravenously was given and patient was placed on BiPAP due to respiratory distress.  Ativan as needed to ensure he was comfortable enough to keep the BiPAP on through the night.  Will need close observation of ongoing renal function and may need dobutamine and Lasix drip.

## 2025-03-24 NOTE — PROCEDURES
PROCEDURE NOTE  Date: 3/24/2025   Name: Lei Jaimes  YOB: 1945    Procedures    Ultrasound Guided Thoracentesis    Indications: Right pleural effusion    Risks of procedure including pneumothorax, hemorrhage, infections and adverse drug reaction were discussed. Informed consent was obtained and a time out was performed prior to the procedure.    Procedure Details: The patient was placed in an upright seated position. Bedside ultrasonography was used to identify the diaphragm, lung, and pleural effusion. The appropriate procedural site was marked accordingly. The procedural site was then cleaned and draped in sterile fashion. Local anesthesia was applied to the skin, subcutaneous tissue, and parietal pleura with 10 cc of 1% lidocaine without epinephrine. A 5 Fr Yueh catheter was used to access the target pleural space in the 8th intercostal space. Using a combination of syringes and evacuated suction bottles, a total of 1700 cc of serous pleural fluid was removed. The Yueh catheter was then removed and a sterile dressing applied to the site.   Complications: None      Dr. Skip Vigil  Intensivist

## 2025-03-25 ENCOUNTER — APPOINTMENT (OUTPATIENT)
Facility: HOSPITAL | Age: 80
DRG: 380 | End: 2025-03-25
Attending: STUDENT IN AN ORGANIZED HEALTH CARE EDUCATION/TRAINING PROGRAM
Payer: MEDICARE

## 2025-03-25 LAB
ALBUMIN SERPL-MCNC: 2.2 G/DL (ref 3.5–5)
ANION GAP SERPL CALC-SCNC: 10 MMOL/L (ref 2–12)
BACTERIA SPEC CULT: ABNORMAL
BACTERIA SPEC CULT: ABNORMAL
BASOPHILS # BLD: 0.01 K/UL (ref 0–0.1)
BASOPHILS NFR BLD: 0.1 % (ref 0–1)
BUN SERPL-MCNC: 69 MG/DL (ref 6–20)
BUN/CREAT SERPL: 20 (ref 12–20)
CA-I BLD-MCNC: 8.6 MG/DL (ref 8.5–10.1)
CHLORIDE SERPL-SCNC: 105 MMOL/L (ref 97–108)
CO2 SERPL-SCNC: 24 MMOL/L (ref 21–32)
CREAT SERPL-MCNC: 3.42 MG/DL (ref 0.7–1.3)
DIFFERENTIAL METHOD BLD: ABNORMAL
EKG ATRIAL RATE: 98 BPM
EKG DIAGNOSIS: NORMAL
EKG P AXIS: 66 DEGREES
EKG P-R INTERVAL: 170 MS
EKG Q-T INTERVAL: 340 MS
EKG QRS DURATION: 92 MS
EKG QTC CALCULATION (BAZETT): 434 MS
EKG R AXIS: 61 DEGREES
EKG T AXIS: 128 DEGREES
EKG VENTRICULAR RATE: 98 BPM
EOSINOPHIL # BLD: 0.01 K/UL (ref 0–0.4)
EOSINOPHIL NFR BLD: 0.1 % (ref 0–7)
ERYTHROCYTE [DISTWIDTH] IN BLOOD BY AUTOMATED COUNT: 14.3 % (ref 11.5–14.5)
GLUCOSE SERPL-MCNC: 91 MG/DL (ref 65–100)
GRAM STN SPEC: ABNORMAL
HCT VFR BLD AUTO: 26.5 % (ref 36.6–50.3)
HGB BLD-MCNC: 8.3 G/DL (ref 12.1–17)
IMM GRANULOCYTES # BLD AUTO: 0.14 K/UL (ref 0–0.04)
IMM GRANULOCYTES NFR BLD AUTO: 0.8 % (ref 0–0.5)
LYMPHOCYTES # BLD: 0.96 K/UL (ref 0.8–3.5)
LYMPHOCYTES NFR BLD: 5.8 % (ref 12–49)
Lab: ABNORMAL
MCH RBC QN AUTO: 30.5 PG (ref 26–34)
MCHC RBC AUTO-ENTMCNC: 31.3 G/DL (ref 30–36.5)
MCV RBC AUTO: 97.4 FL (ref 80–99)
MONOCYTES # BLD: 1.75 K/UL (ref 0–1)
MONOCYTES NFR BLD: 10.6 % (ref 5–13)
NEUTS SEG # BLD: 13.68 K/UL (ref 1.8–8)
NEUTS SEG NFR BLD: 82.6 % (ref 32–75)
NRBC # BLD: 0.48 K/UL (ref 0–0.01)
NRBC BLD-RTO: 2.9 PER 100 WBC
PHOSPHATE SERPL-MCNC: 6 MG/DL (ref 2.6–4.7)
PLATELET # BLD AUTO: 421 K/UL (ref 150–400)
PMV BLD AUTO: 12 FL (ref 8.9–12.9)
POTASSIUM SERPL-SCNC: 3.8 MMOL/L (ref 3.5–5.1)
RBC # BLD AUTO: 2.72 M/UL (ref 4.1–5.7)
SODIUM SERPL-SCNC: 139 MMOL/L (ref 136–145)
WBC # BLD AUTO: 16.6 K/UL (ref 4.1–11.1)

## 2025-03-25 PROCEDURE — 6370000000 HC RX 637 (ALT 250 FOR IP): Performed by: INTERNAL MEDICINE

## 2025-03-25 PROCEDURE — 2500000003 HC RX 250 WO HCPCS: Performed by: STUDENT IN AN ORGANIZED HEALTH CARE EDUCATION/TRAINING PROGRAM

## 2025-03-25 PROCEDURE — 85025 COMPLETE CBC W/AUTO DIFF WBC: CPT

## 2025-03-25 PROCEDURE — 36415 COLL VENOUS BLD VENIPUNCTURE: CPT

## 2025-03-25 PROCEDURE — 1100000000 HC RM PRIVATE

## 2025-03-25 PROCEDURE — 6360000002 HC RX W HCPCS: Performed by: INTERNAL MEDICINE

## 2025-03-25 PROCEDURE — 6360000002 HC RX W HCPCS: Performed by: PHYSICIAN ASSISTANT

## 2025-03-25 PROCEDURE — 2700000000 HC OXYGEN THERAPY PER DAY

## 2025-03-25 PROCEDURE — 6370000000 HC RX 637 (ALT 250 FOR IP): Performed by: STUDENT IN AN ORGANIZED HEALTH CARE EDUCATION/TRAINING PROGRAM

## 2025-03-25 PROCEDURE — 2580000003 HC RX 258: Performed by: PHYSICIAN ASSISTANT

## 2025-03-25 PROCEDURE — 94640 AIRWAY INHALATION TREATMENT: CPT

## 2025-03-25 PROCEDURE — 94761 N-INVAS EAR/PLS OXIMETRY MLT: CPT

## 2025-03-25 PROCEDURE — 80069 RENAL FUNCTION PANEL: CPT

## 2025-03-25 PROCEDURE — 6360000002 HC RX W HCPCS: Performed by: STUDENT IN AN ORGANIZED HEALTH CARE EDUCATION/TRAINING PROGRAM

## 2025-03-25 PROCEDURE — 71250 CT THORAX DX C-: CPT

## 2025-03-25 PROCEDURE — 93005 ELECTROCARDIOGRAM TRACING: CPT | Performed by: INTERNAL MEDICINE

## 2025-03-25 RX ORDER — FUROSEMIDE 10 MG/ML
40 INJECTION INTRAMUSCULAR; INTRAVENOUS DAILY
Status: DISCONTINUED | OUTPATIENT
Start: 2025-03-26 | End: 2025-03-27

## 2025-03-25 RX ORDER — MIDODRINE HYDROCHLORIDE 5 MG/1
10 TABLET ORAL
Status: DISCONTINUED | OUTPATIENT
Start: 2025-03-25 | End: 2025-03-27

## 2025-03-25 RX ORDER — MIDODRINE HYDROCHLORIDE 5 MG/1
5 TABLET ORAL
Status: DISCONTINUED | OUTPATIENT
Start: 2025-03-25 | End: 2025-03-25

## 2025-03-25 RX ORDER — SEVELAMER CARBONATE 800 MG/1
800 TABLET, FILM COATED ORAL
Status: DISCONTINUED | OUTPATIENT
Start: 2025-03-25 | End: 2025-03-26 | Stop reason: SDUPTHER

## 2025-03-25 RX ADMIN — PIPERACILLIN AND TAZOBACTAM 3375 MG: 3; .375 INJECTION, POWDER, FOR SOLUTION INTRAVENOUS; PARENTERAL at 14:16

## 2025-03-25 RX ADMIN — CALCITRIOL CAPSULES 0.25 MCG 0.25 MCG: 0.25 CAPSULE ORAL at 09:33

## 2025-03-25 RX ADMIN — HEPARIN SODIUM 5000 UNITS: 5000 INJECTION INTRAVENOUS; SUBCUTANEOUS at 14:15

## 2025-03-25 RX ADMIN — SODIUM CHLORIDE, PRESERVATIVE FREE 10 ML: 5 INJECTION INTRAVENOUS at 21:38

## 2025-03-25 RX ADMIN — IPRATROPIUM BROMIDE AND ALBUTEROL SULFATE 1 DOSE: 2.5; .5 SOLUTION RESPIRATORY (INHALATION) at 20:52

## 2025-03-25 RX ADMIN — MIDODRINE HYDROCHLORIDE 10 MG: 5 TABLET ORAL at 17:51

## 2025-03-25 RX ADMIN — BUDESONIDE INHALATION 500 MCG: 0.5 SUSPENSION RESPIRATORY (INHALATION) at 20:52

## 2025-03-25 RX ADMIN — ASPIRIN 81 MG: 81 TABLET, COATED ORAL at 09:33

## 2025-03-25 RX ADMIN — ATORVASTATIN CALCIUM 80 MG: 40 TABLET, FILM COATED ORAL at 21:38

## 2025-03-25 RX ADMIN — HEPARIN SODIUM 5000 UNITS: 5000 INJECTION INTRAVENOUS; SUBCUTANEOUS at 21:38

## 2025-03-25 RX ADMIN — PIPERACILLIN AND TAZOBACTAM 3375 MG: 3; .375 INJECTION, POWDER, FOR SOLUTION INTRAVENOUS; PARENTERAL at 06:17

## 2025-03-25 RX ADMIN — IPRATROPIUM BROMIDE AND ALBUTEROL SULFATE 1 DOSE: 2.5; .5 SOLUTION RESPIRATORY (INHALATION) at 14:40

## 2025-03-25 RX ADMIN — METOPROLOL TARTRATE 25 MG: 25 TABLET, FILM COATED ORAL at 09:33

## 2025-03-25 RX ADMIN — BUDESONIDE INHALATION 500 MCG: 0.5 SUSPENSION RESPIRATORY (INHALATION) at 09:02

## 2025-03-25 RX ADMIN — HEPARIN SODIUM 5000 UNITS: 5000 INJECTION INTRAVENOUS; SUBCUTANEOUS at 06:20

## 2025-03-25 RX ADMIN — IPRATROPIUM BROMIDE AND ALBUTEROL SULFATE 1 DOSE: 2.5; .5 SOLUTION RESPIRATORY (INHALATION) at 03:53

## 2025-03-25 RX ADMIN — AZITHROMYCIN DIHYDRATE 250 MG: 500 TABLET ORAL at 09:33

## 2025-03-25 RX ADMIN — SODIUM CHLORIDE, PRESERVATIVE FREE 10 ML: 5 INJECTION INTRAVENOUS at 09:34

## 2025-03-25 RX ADMIN — PIPERACILLIN AND TAZOBACTAM 3375 MG: 3; .375 INJECTION, POWDER, FOR SOLUTION INTRAVENOUS; PARENTERAL at 21:39

## 2025-03-25 RX ADMIN — IPRATROPIUM BROMIDE AND ALBUTEROL SULFATE 1 DOSE: 2.5; .5 SOLUTION RESPIRATORY (INHALATION) at 09:02

## 2025-03-25 RX ADMIN — FUROSEMIDE 20 MG: 10 INJECTION, SOLUTION INTRAMUSCULAR; INTRAVENOUS at 09:33

## 2025-03-25 RX ADMIN — SEVELAMER CARBONATE 800 MG: 800 TABLET, FILM COATED ORAL at 17:51

## 2025-03-25 RX ADMIN — METOPROLOL TARTRATE 25 MG: 25 TABLET, FILM COATED ORAL at 21:38

## 2025-03-25 ASSESSMENT — PAIN - FUNCTIONAL ASSESSMENT
PAIN_FUNCTIONAL_ASSESSMENT: ACTIVITIES ARE NOT PREVENTED

## 2025-03-25 ASSESSMENT — PAIN SCALES - GENERAL
PAINLEVEL_OUTOF10: 0

## 2025-03-25 NOTE — CARE COORDINATION
CM reviewed Pt medicals, Pt lives with his wife and was IND with ADL.    [facility] has accepted Pt.     Will need PT/OT orders for eval/recommendations.     Per IDR  Will transfer Pt to the floor.     PT recommended home with HH.    3:30  [employer] states that a rolling walker has been delivered.

## 2025-03-26 LAB
ALBUMIN SERPL-MCNC: 2.2 G/DL (ref 3.5–5)
ALBUMIN/GLOB SERPL: 0.6 (ref 1.1–2.2)
ALP SERPL-CCNC: 68 U/L (ref 45–117)
ALT SERPL-CCNC: 64 U/L (ref 12–78)
ANION GAP SERPL CALC-SCNC: 9 MMOL/L (ref 2–12)
AST SERPL W P-5'-P-CCNC: 58 U/L (ref 15–37)
BILIRUB SERPL-MCNC: 1 MG/DL (ref 0.2–1)
BUN SERPL-MCNC: 70 MG/DL (ref 6–20)
BUN/CREAT SERPL: 24 (ref 12–20)
CA-I BLD-MCNC: 8.6 MG/DL (ref 8.5–10.1)
CHLORIDE SERPL-SCNC: 104 MMOL/L (ref 97–108)
CO2 SERPL-SCNC: 24 MMOL/L (ref 21–32)
CREAT SERPL-MCNC: 2.92 MG/DL (ref 0.7–1.3)
ERYTHROCYTE [DISTWIDTH] IN BLOOD BY AUTOMATED COUNT: 14.5 % (ref 11.5–14.5)
GLOBULIN SER CALC-MCNC: 4 G/DL (ref 2–4)
GLUCOSE SERPL-MCNC: 112 MG/DL (ref 65–100)
HCT VFR BLD AUTO: 25.8 % (ref 36.6–50.3)
HGB BLD-MCNC: 8.3 G/DL (ref 12.1–17)
MCH RBC QN AUTO: 31.4 PG (ref 26–34)
MCHC RBC AUTO-ENTMCNC: 32.2 G/DL (ref 30–36.5)
MCV RBC AUTO: 97.7 FL (ref 80–99)
NRBC # BLD: 0.45 K/UL (ref 0–0.01)
NRBC BLD-RTO: 3 PER 100 WBC
PLATELET # BLD AUTO: 402 K/UL (ref 150–400)
PMV BLD AUTO: 11.9 FL (ref 8.9–12.9)
POTASSIUM SERPL-SCNC: 3.6 MMOL/L (ref 3.5–5.1)
PROCALCITONIN SERPL-MCNC: 0.36 NG/ML
PROT SERPL-MCNC: 6.2 G/DL (ref 6.4–8.2)
RBC # BLD AUTO: 2.64 M/UL (ref 4.1–5.7)
SODIUM SERPL-SCNC: 137 MMOL/L (ref 136–145)
WBC # BLD AUTO: 15 K/UL (ref 4.1–11.1)

## 2025-03-26 PROCEDURE — 1100000000 HC RM PRIVATE

## 2025-03-26 PROCEDURE — 6370000000 HC RX 637 (ALT 250 FOR IP): Performed by: INTERNAL MEDICINE

## 2025-03-26 PROCEDURE — 94761 N-INVAS EAR/PLS OXIMETRY MLT: CPT

## 2025-03-26 PROCEDURE — 84145 PROCALCITONIN (PCT): CPT

## 2025-03-26 PROCEDURE — 6360000002 HC RX W HCPCS: Performed by: PHYSICIAN ASSISTANT

## 2025-03-26 PROCEDURE — 36415 COLL VENOUS BLD VENIPUNCTURE: CPT

## 2025-03-26 PROCEDURE — 6360000002 HC RX W HCPCS: Performed by: INTERNAL MEDICINE

## 2025-03-26 PROCEDURE — 94640 AIRWAY INHALATION TREATMENT: CPT

## 2025-03-26 PROCEDURE — 2700000000 HC OXYGEN THERAPY PER DAY

## 2025-03-26 PROCEDURE — 85027 COMPLETE CBC AUTOMATED: CPT

## 2025-03-26 PROCEDURE — 6360000002 HC RX W HCPCS: Performed by: STUDENT IN AN ORGANIZED HEALTH CARE EDUCATION/TRAINING PROGRAM

## 2025-03-26 PROCEDURE — 80053 COMPREHEN METABOLIC PANEL: CPT

## 2025-03-26 PROCEDURE — 2500000003 HC RX 250 WO HCPCS: Performed by: STUDENT IN AN ORGANIZED HEALTH CARE EDUCATION/TRAINING PROGRAM

## 2025-03-26 PROCEDURE — 2580000003 HC RX 258: Performed by: PHYSICIAN ASSISTANT

## 2025-03-26 RX ORDER — PANTOPRAZOLE SODIUM 40 MG/1
40 TABLET, DELAYED RELEASE ORAL
Status: DISCONTINUED | OUTPATIENT
Start: 2025-03-27 | End: 2025-04-03 | Stop reason: HOSPADM

## 2025-03-26 RX ORDER — SEVELAMER CARBONATE 0.8 G/1
0.8 POWDER, FOR SUSPENSION ORAL
Status: DISCONTINUED | OUTPATIENT
Start: 2025-03-26 | End: 2025-03-27

## 2025-03-26 RX ADMIN — MIDODRINE HYDROCHLORIDE 10 MG: 5 TABLET ORAL at 17:17

## 2025-03-26 RX ADMIN — ASPIRIN 81 MG: 81 TABLET, COATED ORAL at 09:25

## 2025-03-26 RX ADMIN — MIDODRINE HYDROCHLORIDE 10 MG: 5 TABLET ORAL at 13:15

## 2025-03-26 RX ADMIN — IPRATROPIUM BROMIDE AND ALBUTEROL SULFATE 1 DOSE: 2.5; .5 SOLUTION RESPIRATORY (INHALATION) at 20:22

## 2025-03-26 RX ADMIN — SEVELAMER CARBONATE 800 MG: 800 TABLET, FILM COATED ORAL at 09:25

## 2025-03-26 RX ADMIN — IPRATROPIUM BROMIDE AND ALBUTEROL SULFATE 1 DOSE: 2.5; .5 SOLUTION RESPIRATORY (INHALATION) at 01:33

## 2025-03-26 RX ADMIN — BUDESONIDE INHALATION 500 MCG: 0.5 SUSPENSION RESPIRATORY (INHALATION) at 08:15

## 2025-03-26 RX ADMIN — IPRATROPIUM BROMIDE AND ALBUTEROL SULFATE 1 DOSE: 2.5; .5 SOLUTION RESPIRATORY (INHALATION) at 08:15

## 2025-03-26 RX ADMIN — PIPERACILLIN AND TAZOBACTAM 3375 MG: 3; .375 INJECTION, POWDER, FOR SOLUTION INTRAVENOUS; PARENTERAL at 06:14

## 2025-03-26 RX ADMIN — METOPROLOL TARTRATE 25 MG: 25 TABLET, FILM COATED ORAL at 21:12

## 2025-03-26 RX ADMIN — CALCITRIOL CAPSULES 0.25 MCG 0.25 MCG: 0.25 CAPSULE ORAL at 09:25

## 2025-03-26 RX ADMIN — IPRATROPIUM BROMIDE AND ALBUTEROL SULFATE 1 DOSE: 2.5; .5 SOLUTION RESPIRATORY (INHALATION) at 14:04

## 2025-03-26 RX ADMIN — SEVELAMER CARBONATE 0.8 G: 800 POWDER, FOR SUSPENSION ORAL at 17:17

## 2025-03-26 RX ADMIN — SODIUM CHLORIDE, PRESERVATIVE FREE 10 ML: 5 INJECTION INTRAVENOUS at 09:25

## 2025-03-26 RX ADMIN — HEPARIN SODIUM 5000 UNITS: 5000 INJECTION INTRAVENOUS; SUBCUTANEOUS at 06:18

## 2025-03-26 RX ADMIN — SEVELAMER CARBONATE 0.8 G: 800 POWDER, FOR SUSPENSION ORAL at 13:15

## 2025-03-26 RX ADMIN — BUDESONIDE INHALATION 500 MCG: 0.5 SUSPENSION RESPIRATORY (INHALATION) at 20:22

## 2025-03-26 RX ADMIN — SODIUM CHLORIDE, PRESERVATIVE FREE 10 ML: 5 INJECTION INTRAVENOUS at 21:21

## 2025-03-26 RX ADMIN — PIPERACILLIN AND TAZOBACTAM 3375 MG: 3; .375 INJECTION, POWDER, FOR SOLUTION INTRAVENOUS; PARENTERAL at 13:18

## 2025-03-26 RX ADMIN — METOPROLOL TARTRATE 25 MG: 25 TABLET, FILM COATED ORAL at 09:25

## 2025-03-26 RX ADMIN — ATORVASTATIN CALCIUM 80 MG: 40 TABLET, FILM COATED ORAL at 21:13

## 2025-03-26 RX ADMIN — HEPARIN SODIUM 5000 UNITS: 5000 INJECTION INTRAVENOUS; SUBCUTANEOUS at 13:15

## 2025-03-26 RX ADMIN — MIDODRINE HYDROCHLORIDE 10 MG: 5 TABLET ORAL at 09:24

## 2025-03-26 RX ADMIN — HEPARIN SODIUM 5000 UNITS: 5000 INJECTION INTRAVENOUS; SUBCUTANEOUS at 21:13

## 2025-03-26 RX ADMIN — FUROSEMIDE 40 MG: 10 INJECTION, SOLUTION INTRAMUSCULAR; INTRAVENOUS at 09:24

## 2025-03-26 RX ADMIN — PIPERACILLIN AND TAZOBACTAM 3375 MG: 3; .375 INJECTION, POWDER, FOR SOLUTION INTRAVENOUS; PARENTERAL at 21:20

## 2025-03-26 ASSESSMENT — PAIN SCALES - GENERAL: PAINLEVEL_OUTOF10: 0

## 2025-03-26 NOTE — CARE COORDINATION
CM reviewed Pt medicals, Pt lives with his wife and was IND with ADL.     [facility] has accepted Pt.      Will need new  PT/OT orders for eval/recommendations.

## 2025-03-27 LAB
ALBUMIN SERPL-MCNC: 2.4 G/DL (ref 3.5–5)
ALBUMIN/GLOB SERPL: 0.6 (ref 1.1–2.2)
ALP SERPL-CCNC: 64 U/L (ref 45–117)
ALT SERPL-CCNC: 50 U/L (ref 12–78)
ANION GAP SERPL CALC-SCNC: 7 MMOL/L (ref 2–12)
AST SERPL W P-5'-P-CCNC: 35 U/L (ref 15–37)
BILIRUB SERPL-MCNC: 1.1 MG/DL (ref 0.2–1)
BUN SERPL-MCNC: 62 MG/DL (ref 6–20)
BUN/CREAT SERPL: 26 (ref 12–20)
CA-I BLD-MCNC: 8.5 MG/DL (ref 8.5–10.1)
CHLORIDE SERPL-SCNC: 104 MMOL/L (ref 97–108)
CO2 SERPL-SCNC: 26 MMOL/L (ref 21–32)
CREAT SERPL-MCNC: 2.38 MG/DL (ref 0.7–1.3)
ERYTHROCYTE [DISTWIDTH] IN BLOOD BY AUTOMATED COUNT: 15.7 % (ref 11.5–14.5)
GLOBULIN SER CALC-MCNC: 3.8 G/DL (ref 2–4)
GLUCOSE SERPL-MCNC: 118 MG/DL (ref 65–100)
HCT VFR BLD AUTO: 27.2 % (ref 36.6–50.3)
HGB BLD-MCNC: 8.6 G/DL (ref 12.1–17)
M PNEUMO IGM SER IA-ACNC: NONREACTIVE
MAGNESIUM SERPL-MCNC: 2.3 MG/DL (ref 1.6–2.4)
MCH RBC QN AUTO: 31.7 PG (ref 26–34)
MCHC RBC AUTO-ENTMCNC: 31.6 G/DL (ref 30–36.5)
MCV RBC AUTO: 100.4 FL (ref 80–99)
NRBC # BLD: 0.14 K/UL (ref 0–0.01)
NRBC BLD-RTO: 0.9 PER 100 WBC
PHOSPHATE SERPL-MCNC: 3.3 MG/DL (ref 2.6–4.7)
PLATELET # BLD AUTO: 388 K/UL (ref 150–400)
PMV BLD AUTO: 11.7 FL (ref 8.9–12.9)
POTASSIUM SERPL-SCNC: 3.4 MMOL/L (ref 3.5–5.1)
PROCALCITONIN SERPL-MCNC: 0.33 NG/ML
PROT SERPL-MCNC: 6.2 G/DL (ref 6.4–8.2)
RBC # BLD AUTO: 2.71 M/UL (ref 4.1–5.7)
SODIUM SERPL-SCNC: 137 MMOL/L (ref 136–145)
WBC # BLD AUTO: 15.4 K/UL (ref 4.1–11.1)

## 2025-03-27 PROCEDURE — 6370000000 HC RX 637 (ALT 250 FOR IP): Performed by: INTERNAL MEDICINE

## 2025-03-27 PROCEDURE — 83735 ASSAY OF MAGNESIUM: CPT

## 2025-03-27 PROCEDURE — 94640 AIRWAY INHALATION TREATMENT: CPT

## 2025-03-27 PROCEDURE — 97530 THERAPEUTIC ACTIVITIES: CPT

## 2025-03-27 PROCEDURE — 80053 COMPREHEN METABOLIC PANEL: CPT

## 2025-03-27 PROCEDURE — 94761 N-INVAS EAR/PLS OXIMETRY MLT: CPT

## 2025-03-27 PROCEDURE — 6360000002 HC RX W HCPCS: Performed by: INTERNAL MEDICINE

## 2025-03-27 PROCEDURE — 86738 MYCOPLASMA ANTIBODY: CPT

## 2025-03-27 PROCEDURE — 2000000000 HC ICU R&B

## 2025-03-27 PROCEDURE — 2500000003 HC RX 250 WO HCPCS: Performed by: STUDENT IN AN ORGANIZED HEALTH CARE EDUCATION/TRAINING PROGRAM

## 2025-03-27 PROCEDURE — 36415 COLL VENOUS BLD VENIPUNCTURE: CPT

## 2025-03-27 PROCEDURE — 6370000000 HC RX 637 (ALT 250 FOR IP): Performed by: NURSE PRACTITIONER

## 2025-03-27 PROCEDURE — 6370000000 HC RX 637 (ALT 250 FOR IP)

## 2025-03-27 PROCEDURE — 85027 COMPLETE CBC AUTOMATED: CPT

## 2025-03-27 PROCEDURE — 6360000002 HC RX W HCPCS: Performed by: STUDENT IN AN ORGANIZED HEALTH CARE EDUCATION/TRAINING PROGRAM

## 2025-03-27 PROCEDURE — 2580000003 HC RX 258: Performed by: PHYSICIAN ASSISTANT

## 2025-03-27 PROCEDURE — 84145 PROCALCITONIN (PCT): CPT

## 2025-03-27 PROCEDURE — 2700000000 HC OXYGEN THERAPY PER DAY

## 2025-03-27 PROCEDURE — 84100 ASSAY OF PHOSPHORUS: CPT

## 2025-03-27 PROCEDURE — 6360000002 HC RX W HCPCS: Performed by: PHYSICIAN ASSISTANT

## 2025-03-27 RX ORDER — FUROSEMIDE 40 MG/1
40 TABLET ORAL DAILY
Status: DISCONTINUED | OUTPATIENT
Start: 2025-03-28 | End: 2025-03-28

## 2025-03-27 RX ORDER — MIDODRINE HYDROCHLORIDE 5 MG/1
5 TABLET ORAL
Status: DISCONTINUED | OUTPATIENT
Start: 2025-03-27 | End: 2025-03-31

## 2025-03-27 RX ORDER — POTASSIUM CHLORIDE 1500 MG/1
40 TABLET, EXTENDED RELEASE ORAL ONCE
Status: DISCONTINUED | OUTPATIENT
Start: 2025-03-27 | End: 2025-03-27

## 2025-03-27 RX ORDER — FLUCONAZOLE 100 MG/1
200 TABLET ORAL DAILY
Status: CANCELLED | OUTPATIENT
Start: 2025-03-27

## 2025-03-27 RX ADMIN — PANTOPRAZOLE SODIUM 40 MG: 40 TABLET, DELAYED RELEASE ORAL at 08:48

## 2025-03-27 RX ADMIN — IPRATROPIUM BROMIDE AND ALBUTEROL SULFATE 1 DOSE: 2.5; .5 SOLUTION RESPIRATORY (INHALATION) at 14:10

## 2025-03-27 RX ADMIN — HEPARIN SODIUM 5000 UNITS: 5000 INJECTION INTRAVENOUS; SUBCUTANEOUS at 21:36

## 2025-03-27 RX ADMIN — PIPERACILLIN AND TAZOBACTAM 3375 MG: 3; .375 INJECTION, POWDER, FOR SOLUTION INTRAVENOUS; PARENTERAL at 06:16

## 2025-03-27 RX ADMIN — HEPARIN SODIUM 5000 UNITS: 5000 INJECTION INTRAVENOUS; SUBCUTANEOUS at 06:16

## 2025-03-27 RX ADMIN — IPRATROPIUM BROMIDE AND ALBUTEROL SULFATE 1 DOSE: 2.5; .5 SOLUTION RESPIRATORY (INHALATION) at 04:01

## 2025-03-27 RX ADMIN — METOPROLOL TARTRATE 25 MG: 25 TABLET, FILM COATED ORAL at 20:38

## 2025-03-27 RX ADMIN — ATORVASTATIN CALCIUM 80 MG: 40 TABLET, FILM COATED ORAL at 20:38

## 2025-03-27 RX ADMIN — PIPERACILLIN AND TAZOBACTAM 3375 MG: 3; .375 INJECTION, POWDER, FOR SOLUTION INTRAVENOUS; PARENTERAL at 12:42

## 2025-03-27 RX ADMIN — SODIUM CHLORIDE, PRESERVATIVE FREE 10 ML: 5 INJECTION INTRAVENOUS at 20:38

## 2025-03-27 RX ADMIN — SODIUM CHLORIDE, PRESERVATIVE FREE 10 ML: 5 INJECTION INTRAVENOUS at 08:48

## 2025-03-27 RX ADMIN — FUROSEMIDE 40 MG: 10 INJECTION, SOLUTION INTRAMUSCULAR; INTRAVENOUS at 08:48

## 2025-03-27 RX ADMIN — BUDESONIDE INHALATION 500 MCG: 0.5 SUSPENSION RESPIRATORY (INHALATION) at 20:02

## 2025-03-27 RX ADMIN — MIDODRINE HYDROCHLORIDE 5 MG: 5 TABLET ORAL at 18:43

## 2025-03-27 RX ADMIN — Medication 5 MG: at 21:36

## 2025-03-27 RX ADMIN — HEPARIN SODIUM 5000 UNITS: 5000 INJECTION INTRAVENOUS; SUBCUTANEOUS at 15:22

## 2025-03-27 RX ADMIN — PIPERACILLIN AND TAZOBACTAM 3375 MG: 3; .375 INJECTION, POWDER, FOR SOLUTION INTRAVENOUS; PARENTERAL at 20:37

## 2025-03-27 RX ADMIN — ASPIRIN 81 MG: 81 TABLET, COATED ORAL at 08:48

## 2025-03-27 RX ADMIN — IPRATROPIUM BROMIDE AND ALBUTEROL SULFATE 1 DOSE: 2.5; .5 SOLUTION RESPIRATORY (INHALATION) at 20:02

## 2025-03-27 RX ADMIN — METOPROLOL TARTRATE 25 MG: 25 TABLET, FILM COATED ORAL at 08:48

## 2025-03-27 RX ADMIN — MIDODRINE HYDROCHLORIDE 10 MG: 5 TABLET ORAL at 08:48

## 2025-03-27 RX ADMIN — CALCITRIOL CAPSULES 0.25 MCG 0.25 MCG: 0.25 CAPSULE ORAL at 08:48

## 2025-03-27 RX ADMIN — POTASSIUM BICARBONATE 40 MEQ: 782 TABLET, EFFERVESCENT ORAL at 12:40

## 2025-03-27 RX ADMIN — IPRATROPIUM BROMIDE AND ALBUTEROL SULFATE 1 DOSE: 2.5; .5 SOLUTION RESPIRATORY (INHALATION) at 08:24

## 2025-03-27 RX ADMIN — SEVELAMER CARBONATE 0.8 G: 800 POWDER, FOR SUSPENSION ORAL at 08:47

## 2025-03-27 RX ADMIN — BUDESONIDE INHALATION 500 MCG: 0.5 SUSPENSION RESPIRATORY (INHALATION) at 08:24

## 2025-03-27 ASSESSMENT — PAIN SCALES - GENERAL
PAINLEVEL_OUTOF10: 0

## 2025-03-27 NOTE — CARE COORDINATION
CM reviewed Pt medicals, Pt lives with his wife and was IND with ADL    [facility] has accepted Pt.    Will need HH order when Pt is D/C.    PT/OT has been ordered.     Pt has been transferred to the floor two days ago.

## 2025-03-28 ENCOUNTER — APPOINTMENT (OUTPATIENT)
Facility: HOSPITAL | Age: 80
DRG: 380 | End: 2025-03-28
Attending: STUDENT IN AN ORGANIZED HEALTH CARE EDUCATION/TRAINING PROGRAM
Payer: MEDICARE

## 2025-03-28 LAB
ALBUMIN SERPL-MCNC: 2.2 G/DL (ref 3.5–5)
ALBUMIN/GLOB SERPL: 0.5 (ref 1.1–2.2)
ALP SERPL-CCNC: 63 U/L (ref 45–117)
ALT SERPL-CCNC: 51 U/L (ref 12–78)
ANION GAP SERPL CALC-SCNC: 4 MMOL/L (ref 2–12)
AST SERPL W P-5'-P-CCNC: 32 U/L (ref 15–37)
BACTERIA SPEC CULT: NORMAL
BACTERIA SPEC CULT: NORMAL
BILIRUB SERPL-MCNC: 1.1 MG/DL (ref 0.2–1)
BUN SERPL-MCNC: 55 MG/DL (ref 6–20)
BUN/CREAT SERPL: 23 (ref 12–20)
CA-I BLD-MCNC: 8.5 MG/DL (ref 8.5–10.1)
CHLORIDE SERPL-SCNC: 106 MMOL/L (ref 97–108)
CO2 SERPL-SCNC: 29 MMOL/L (ref 21–32)
CREAT SERPL-MCNC: 2.4 MG/DL (ref 0.7–1.3)
ERYTHROCYTE [DISTWIDTH] IN BLOOD BY AUTOMATED COUNT: 16.2 % (ref 11.5–14.5)
GLOBULIN SER CALC-MCNC: 4.1 G/DL (ref 2–4)
GLUCOSE SERPL-MCNC: 120 MG/DL (ref 65–100)
HCT VFR BLD AUTO: 28.4 % (ref 36.6–50.3)
HGB BLD-MCNC: 8.7 G/DL (ref 12.1–17)
Lab: NORMAL
Lab: NORMAL
MCH RBC QN AUTO: 31.1 PG (ref 26–34)
MCHC RBC AUTO-ENTMCNC: 30.6 G/DL (ref 30–36.5)
MCV RBC AUTO: 101.4 FL (ref 80–99)
MRSA DNA SPEC QL NAA+PROBE: NOT DETECTED
NRBC # BLD: 0.05 K/UL (ref 0–0.01)
NRBC BLD-RTO: 0.3 PER 100 WBC
PLATELET # BLD AUTO: 373 K/UL (ref 150–400)
PMV BLD AUTO: 11.5 FL (ref 8.9–12.9)
POTASSIUM SERPL-SCNC: 3.8 MMOL/L (ref 3.5–5.1)
PROCALCITONIN SERPL-MCNC: 0.29 NG/ML
PROT SERPL-MCNC: 6.3 G/DL (ref 6.4–8.2)
RBC # BLD AUTO: 2.8 M/UL (ref 4.1–5.7)
SODIUM SERPL-SCNC: 139 MMOL/L (ref 136–145)
WBC # BLD AUTO: 18.5 K/UL (ref 4.1–11.1)

## 2025-03-28 PROCEDURE — 6370000000 HC RX 637 (ALT 250 FOR IP): Performed by: PHYSICIAN ASSISTANT

## 2025-03-28 PROCEDURE — 87641 MR-STAPH DNA AMP PROBE: CPT

## 2025-03-28 PROCEDURE — 2500000003 HC RX 250 WO HCPCS: Performed by: STUDENT IN AN ORGANIZED HEALTH CARE EDUCATION/TRAINING PROGRAM

## 2025-03-28 PROCEDURE — 6370000000 HC RX 637 (ALT 250 FOR IP): Performed by: INTERNAL MEDICINE

## 2025-03-28 PROCEDURE — 6360000002 HC RX W HCPCS

## 2025-03-28 PROCEDURE — 87040 BLOOD CULTURE FOR BACTERIA: CPT

## 2025-03-28 PROCEDURE — 6360000002 HC RX W HCPCS: Performed by: INTERNAL MEDICINE

## 2025-03-28 PROCEDURE — 36415 COLL VENOUS BLD VENIPUNCTURE: CPT

## 2025-03-28 PROCEDURE — 2580000003 HC RX 258: Performed by: STUDENT IN AN ORGANIZED HEALTH CARE EDUCATION/TRAINING PROGRAM

## 2025-03-28 PROCEDURE — 71045 X-RAY EXAM CHEST 1 VIEW: CPT

## 2025-03-28 PROCEDURE — 94640 AIRWAY INHALATION TREATMENT: CPT

## 2025-03-28 PROCEDURE — 80053 COMPREHEN METABOLIC PANEL: CPT

## 2025-03-28 PROCEDURE — 1100000000 HC RM PRIVATE

## 2025-03-28 PROCEDURE — 2580000003 HC RX 258: Performed by: PHYSICIAN ASSISTANT

## 2025-03-28 PROCEDURE — 84145 PROCALCITONIN (PCT): CPT

## 2025-03-28 PROCEDURE — 99223 1ST HOSP IP/OBS HIGH 75: CPT | Performed by: INTERNAL MEDICINE

## 2025-03-28 PROCEDURE — 2580000003 HC RX 258

## 2025-03-28 PROCEDURE — 6360000002 HC RX W HCPCS: Performed by: PHYSICIAN ASSISTANT

## 2025-03-28 PROCEDURE — 6360000002 HC RX W HCPCS: Performed by: STUDENT IN AN ORGANIZED HEALTH CARE EDUCATION/TRAINING PROGRAM

## 2025-03-28 PROCEDURE — 85027 COMPLETE CBC AUTOMATED: CPT

## 2025-03-28 PROCEDURE — 94761 N-INVAS EAR/PLS OXIMETRY MLT: CPT

## 2025-03-28 PROCEDURE — 6370000000 HC RX 637 (ALT 250 FOR IP)

## 2025-03-28 RX ORDER — AZITHROMYCIN 500 MG/1
250 TABLET, FILM COATED ORAL DAILY
Status: DISCONTINUED | OUTPATIENT
Start: 2025-03-28 | End: 2025-03-28

## 2025-03-28 RX ORDER — FUROSEMIDE 40 MG/1
40 TABLET ORAL
Status: DISCONTINUED | OUTPATIENT
Start: 2025-03-28 | End: 2025-03-29

## 2025-03-28 RX ADMIN — SODIUM CHLORIDE: 0.9 INJECTION, SOLUTION INTRAVENOUS at 02:33

## 2025-03-28 RX ADMIN — PIPERACILLIN AND TAZOBACTAM 3375 MG: 3; .375 INJECTION, POWDER, FOR SOLUTION INTRAVENOUS; PARENTERAL at 13:36

## 2025-03-28 RX ADMIN — FUROSEMIDE 40 MG: 40 TABLET ORAL at 16:25

## 2025-03-28 RX ADMIN — MIDODRINE HYDROCHLORIDE 5 MG: 5 TABLET ORAL at 11:22

## 2025-03-28 RX ADMIN — BUDESONIDE INHALATION 500 MCG: 0.5 SUSPENSION RESPIRATORY (INHALATION) at 09:09

## 2025-03-28 RX ADMIN — IPRATROPIUM BROMIDE AND ALBUTEROL SULFATE 1 DOSE: 2.5; .5 SOLUTION RESPIRATORY (INHALATION) at 09:09

## 2025-03-28 RX ADMIN — HEPARIN SODIUM 5000 UNITS: 5000 INJECTION INTRAVENOUS; SUBCUTANEOUS at 05:40

## 2025-03-28 RX ADMIN — CALCITRIOL CAPSULES 0.25 MCG 0.25 MCG: 0.25 CAPSULE ORAL at 09:10

## 2025-03-28 RX ADMIN — MICAFUNGIN SODIUM 100 MG: 100 INJECTION, POWDER, LYOPHILIZED, FOR SOLUTION INTRAVENOUS at 18:34

## 2025-03-28 RX ADMIN — FUROSEMIDE 40 MG: 40 TABLET ORAL at 09:10

## 2025-03-28 RX ADMIN — IPRATROPIUM BROMIDE AND ALBUTEROL SULFATE 1 DOSE: 2.5; .5 SOLUTION RESPIRATORY (INHALATION) at 13:38

## 2025-03-28 RX ADMIN — ATORVASTATIN CALCIUM 80 MG: 40 TABLET, FILM COATED ORAL at 20:50

## 2025-03-28 RX ADMIN — PANTOPRAZOLE SODIUM 40 MG: 40 TABLET, DELAYED RELEASE ORAL at 05:41

## 2025-03-28 RX ADMIN — MIDODRINE HYDROCHLORIDE 5 MG: 5 TABLET ORAL at 16:25

## 2025-03-28 RX ADMIN — METOPROLOL TARTRATE 25 MG: 25 TABLET, FILM COATED ORAL at 20:50

## 2025-03-28 RX ADMIN — LORAZEPAM 1 MG: 1 TABLET ORAL at 20:50

## 2025-03-28 RX ADMIN — MIDODRINE HYDROCHLORIDE 5 MG: 5 TABLET ORAL at 07:52

## 2025-03-28 RX ADMIN — HEPARIN SODIUM 5000 UNITS: 5000 INJECTION INTRAVENOUS; SUBCUTANEOUS at 13:34

## 2025-03-28 RX ADMIN — MEROPENEM 1000 MG: 1 INJECTION INTRAVENOUS at 20:53

## 2025-03-28 RX ADMIN — SODIUM CHLORIDE, PRESERVATIVE FREE 10 ML: 5 INJECTION INTRAVENOUS at 20:51

## 2025-03-28 RX ADMIN — EPOETIN ALFA-EPBX 10000 UNITS: 10000 INJECTION, SOLUTION INTRAVENOUS; SUBCUTANEOUS at 19:46

## 2025-03-28 RX ADMIN — BUDESONIDE INHALATION 500 MCG: 0.5 SUSPENSION RESPIRATORY (INHALATION) at 20:51

## 2025-03-28 RX ADMIN — HEPARIN SODIUM 5000 UNITS: 5000 INJECTION INTRAVENOUS; SUBCUTANEOUS at 21:31

## 2025-03-28 RX ADMIN — IPRATROPIUM BROMIDE AND ALBUTEROL SULFATE 1 DOSE: 2.5; .5 SOLUTION RESPIRATORY (INHALATION) at 20:51

## 2025-03-28 RX ADMIN — PIPERACILLIN AND TAZOBACTAM 3375 MG: 3; .375 INJECTION, POWDER, FOR SOLUTION INTRAVENOUS; PARENTERAL at 05:23

## 2025-03-28 RX ADMIN — METOPROLOL TARTRATE 25 MG: 25 TABLET, FILM COATED ORAL at 09:10

## 2025-03-28 RX ADMIN — ASPIRIN 81 MG: 81 TABLET, COATED ORAL at 09:10

## 2025-03-28 ASSESSMENT — PAIN SCALES - GENERAL
PAINLEVEL_OUTOF10: 0

## 2025-03-28 NOTE — CONSULTS
Gastroenterology Consult Note        Patient: Lei Jaimes MRN: [id number]  SSN: [id number]    YOB: 1945  Age: 79 y.o.  Sex: male      Subjective:      Lei Jaimes is a 79 y.o. male who is being seen for possible GI bleeding,.  79 y.o. male with pmh as below who presents with choking,  he was drinking some coffee, emesis,  He states he has a history of strictures and is followed by GI for dilatations with a history of esophageal stricture, as well as a significant leukocytosis with no obvious infectious source. . Troponin trended upwards with normal CK. Cardiology was consulted here, however that morning the following events transpired and patient is transferred to [place] ICU with upper GI bleeding: Overnight patient had no further episode emesis, received some Lovenox, He was seen by his cardiologist, no acute ischemic, GI consulted  Past Medical History:   Diagnosis Date    BPH (benign prostatic hyperplasia) 06/01/2021    Colon polyps 2007    RECTAL POLYP    Cyst of kidney, acquired     pt denies - states had kidney stones    Dysphagia 06/01/2021    Dysphagia     Enlarged prostate     Flatulence 06/01/2021    GERD (gastroesophageal reflux disease)     GERD (gastroesophageal reflux disease)     HTN (hypertension) 06/01/2021    Inguinal hernia     left side x 6 months    Kidney stones     Left adrenal mass 09/27/2004    ADENOMA  11-6-06    Reflux esophagitis 06/01/2021    Stricture of esophagus 06/01/2021     Past Surgical History:   Procedure Laterality Date    APPENDECTOMY,W OTHR C      CATARACT REMOVAL Bilateral     9-, 10-25-05 with lens implants    COLONOSCOPY N/A 6/17/2021    COLONOSCOPY AND EGD (T I V A) performed by Autumn Soto MD at [facility] ENDOSCOPY    COLONOSCOPY  10/01/2007    RECTAL, POLYP    EGD      ENDOSCOPY VISIT OUTPATIENT  10/01/2007    EGD, ED , COLONOSCOPY RECTAL POLYP    ENDOSCOPY VISIT OUTPATIENT  07/10/2015    pt states every 2-3 years needs 
    History & Physical    Primary Care Provider: Akash Contreras MD  Source of Information: Patient/family     Chief complaint: No chief complaint on file.       History of Presenting Illness:   Lei Jaimes is a 79 y.o. male with pmh as below who presents with choking this am. He states he was drinking his coffee and eating a Jamaican when he started coughing and felt as thought the food was stuck mid esophagus. He states he panicked, became anxious, started coughing then threw up. He states he has a history of strictures and is followed by GI for dilatations. He states he is now having right sided rib pain that does not radiate, is associated with diaphoresis, fatigue, emesis, and obvious SOB from choking. He states he continues to throw up and had some small blood after his very hard throw up, which has not happened since. He notes he has upper abd pain that is relieved by belching. He denies any fevers, chills, dysuria, hematuria, or frequency. He notes history of BPH but is not having any LUTS at this time.      This am he was noted to have increased hematemesis and the transfer request for GI and cardiology evaluation was made. He currently arrived on octreotide and LR with stable hemodynamics. He denies any pain currently.      3/14: noted to have more hematemesis last evening and went for a CTA chest/A/P with no acute bleeding identified. Plan for EGD today. Cardiology consulted and agree with NSTEMI, type II. Recommend anticoagulation when able.   3/15: Hb <7 this am and received 2u pRBC. No chest pain today with elevation in troponin. Cardiology reviewed troponin level and EKG with no further intervention at this time.   3/16: had increased oxygen requirements overnight with concern for aspiration pneumonia  3/17: TTE with worsening LVEF. Required BIPAP overnight for increased oxygen requirements. Diuresed with lasix IV  3/18: transferred to [facility] for further management        Review of Systems:  A 
    Hospitalist Admission Note    NAME: Lei Jaimes   :  1945   MRN:  [id number]     Date/Time:  3/25/2025 3:51 PM    Patient PCP: Akash Contreras MD    ______________________________________________________________________  Given the patient's current clinical presentation in regards to the patient's multiple medical problems, complex decision making was performed, which includes reviewing the patient's available past medical records, laboratory results, and diagnostic studies.       My assessment of this patient's clinical condition and my plan of care is as follows.    Assessment / Plan:  Aspiration pneumonia  Acute on chronic HFrEF  Pulmonary edema  Bilateral pleural effusions s/p thoracentesis  Acute hypoxic respiratory failure secondary to above  -Now on 3 L nasal cannula  -Bilateral thoracentesis performed 3/24 with 1700 cc pleural fluid  -CT chest after thoracentesis on 3/25 shows improvement in pleural effusions but increase in bilateral lower lobe consolidation concerning for multifocal pneumonia  -Trend WBC and Pro-Yasmany  -Continue IV Lasix, Pulmicort and DuoNebs  -Continue Zosyn  -Incentive spirometry  -Pulmonology and cardiology following, appreciate recs    SARA on CKD stage III  -Creatinine 1.25 > 3.42, baseline likely around 1.2   -Likely prerenal, however unable to tolerate IVF at this time given fluid overload  -Nephrology following, appreciate recs    Anemia of chronic disease  Upper GI bleed  -EGD showed mid esophageal ulcer that was injected with epi  -S/p 2 units PRBC  -H&H now stable  -Anemia panel shows iron deficiency, IV Ferrlecit x 3 doses  -Retacrit with dialysis  -Monitor and transfuse for Hgb <7    Paroxysmal atrial fibrillation  -Continue metoprolol  -Defer anticoagulation at this time given recent UGIB    NSTEMI, type II  -Troponin peaked at 35,000, now trending down  -Continue aspirin  -No further intervention per cardio    Hypokalemia  Hypomagnesemia  -Daily BMP and 
  CRITICAL CARE CONSULT NOTE    Name: Lei Jaimes   : 1945   MRN: [id number]   Date: 3/24/2025      Diagnoses/problem list:   Acute hypoxic respiratory failure  Acute systolic heart failure  Pulmonary hypertension  Multifocal pneumonia  A.fib  Esophageal stenosis  SARA on CKD    HPI   79 y.o. male with PMH of HTN, esophageal stenosis, was initially admitted on 3/13/25 with choking symptoms. Admitted to ICU for concerns of UGIB. Underwent endoscopy (3/14) which showed bleeding esophageal ulcers in linear appearance. TTE showed new heart failure with EF 25%. Needed diuretics in ICU to taper his B-pap and transferred to medical floor on 3/17/25. Worsening renal functions on the floor needing IV fluids. RRT was called for hypoxia needing B-pap. CXR with pulmonary edema. New onset of A.fib. Transferred back to ICU on 3/24/25. Being seen by pulmonary for concerns of aspiration pneumonia.     ROS negative except as otherwise documented.    Assessment and plan:     1. Acute hypoxic respiratory failure  - Likely from volume overload and CHF  - Bedside POCUS with moderate effusions  - Underwent bilateral thoracentesis with removal of 1600 cc of serous fluid from each side  - C/w diuretics     2. Multifocal pneumonia  - Trend procalcitonin  - Empirically on zosyn  - MRSA swab was negative  - Concerns for aspiration?    3. Acute systolic heart failure  - LVEF with 25%  - C/w toprol  - Keep net negative  - Cardiology on board    4. SARA on CKD  - Keep net negative  - Likely cardiorenal   - Nephrology on board    5. A.fib  - Had transient Afib before  - Currently in sinus rhythm  - Hold A/c given recent GI bleed    SYSTEMS     Neuro - Awake  Pulm - Wean Fio2  CVS - Rate control  GI - Po diet  Renal - SARA    ICU DAILY CHECKLIST     Code Status: Full code  DVT Prophylaxis: SCDs/Hep sq  GI Prophylaxis: Not needed  Feeding: PO diet  ABCDEF Bundle/Checklist Completed: Yes  Disposition: Stay in ICU  Social: Discussed with 
3/13/2025, 2:25 PM        [facility] at [address] [address]  Phone: [phone]      Cardiology Consult      3/13/2025, 2:25 PM      Lei Jaimes  79 y.o. male  1945      Admit Date:  3/13/2025    Primary Cardiologist:  Dr. Beach  Reason for consult:  Chest pain  Requesting provider:  Dr. Peace    Impression:      Atypical chest pain which appears to be related to patient's dysphagia with history of esophageal strictures.  Mild troponin leak which appears to be type II non-STEMI with poor renal clearance.  Echo on this admission shows preserved LV systolic function without wall motion abnormalities.  History of hypertension     Plan:      Continue low-dose aspirin with topical nitrates, betablocker, statin and Lovenox for empiric antianginal coverage.  Pharmacologic stress MPI study to assess ischemia.  Further recommendations as dictated by patient's stress MPI study results.    Subjective   This is a pleasant six 79-year-old -American man with history of hypertension, esophageal strictures with dysphagia, status post esophageal dilatation, CKD, GERD and no significant past cardiac history who earlier presented to [facility] in [place] with a feeling of choking sensation while eating breakfast.  Patient following choking sensation had a coughing spell  withmidepigastric sharp pain radiating to throat.  Patient afterwards was diaphoretic and was taken to ER by wife.  In the emergency room patient's was noted for BNP of 3884 with peak troponin of 129 which later trended down to 101.  His EKG showed sinus rhythm with left heart hypertrophy without any acute ischemic changes.  Cardiology consulted for chest pain and elevated troponin.    Past medical, Family & Social History   The following medical history was reviewed    Past Medical History:   Past Medical History:   Diagnosis Date    BPH (benign prostatic hyperplasia) 06/01/2021    Colon polyps 2007    RECTAL 
Consult Note            Date:3/28/2025        Patient Name:Lei Jaimes     YOB: 1945     Age:79 y.o.    Consults  Infectious Disease    Chief Complaint      Worsening pneumonia despite zosyn, concern for aspiration, heavy yeast on sputum culture     History Obtained From   patient    History of Present Illness   This is a 79 year old male with history of dysphagia who appears to have had stormy hospital course with aspiration pneumonia with hypoxic respiratory failure along with pleural effusions. He apparently underwent thoracentesis, but unable to locate cell count or culture.  Blood cultures have been negative so far and sputum grew normal respiratory vivien and heavy yeast.  Patient previously on Ceftriaxone, Zosyn, Azithromycin. He  is afebrile but WBC remains elevated.  Concern for worsening pneumonia.  ID has been consulted for this reason.     Patient seen in the ICU where he was on room air and reporting no breathing difficulty.  States that he previously had a cough that has resolved.  Reports no other complaints. Has purewick device in place.    Past Medical History     Past Medical History:   Diagnosis Date    BPH (benign prostatic hyperplasia) 06/01/2021    Colon polyps 2007    RECTAL POLYP    Cyst of kidney, acquired     pt denies - states had kidney stones    Dysphagia 06/01/2021    Dysphagia     Enlarged prostate     Flatulence 06/01/2021    GERD (gastroesophageal reflux disease)     GERD (gastroesophageal reflux disease)     HTN (hypertension) 06/01/2021    Inguinal hernia     left side x 6 months    Kidney stones     Left adrenal mass 09/27/2004    ADENOMA  11-6-06    Reflux esophagitis 06/01/2021    Stricture of esophagus 06/01/2021        Past Surgical History     Past Surgical History:   Procedure Laterality Date    APPENDECTOMY,W OTHR C      CATARACT REMOVAL Bilateral     9-, 10-25-05 with lens implants    COLONOSCOPY N/A 6/17/2021    COLONOSCOPY AND EGD (T I V A) 
illness.   No other significant complaints on complete review of systems.    Objective:   VITALS:    /61   Pulse (!) 103   Temp 97.9 °F (36.6 °C) (Oral)   Resp 16   Ht 1.803 m (5' 11\") Comment: Pt stated  Wt 75.5 kg (166 lb 7.2 oz)   SpO2 93%   BMI 23.21 kg/m²   Temp (24hrs), Av.5 °F (36.9 °C), Min:97.9 °F (36.6 °C), Max:98.8 °F (37.1 °C)      PHYSICAL EXAM:   General: alert, awake, well-oriented, no acute distress.  HEENT: EOMI, no icterus, pallor+, pupils reactive, mucosa moist, normal inspection of ears and nose, throat clear.  Neck: Neck is supple, No JVD.  Lungs: breathsounds normal, no respiratory distress on inspection, CTA.  CVS: heart sounds normal, regular rate and rhythm.  GI: soft, nontender, normal BS.  Extremities: no clubbing, no cyanosis, no edema.  Neuro: Alert, awake, oriented x3, moving all extremities well.  Skin: normal skin turgor, no skin rashes or ulcerations.      LAB DATA REVIEWED:    Recent Results (from the past 24 hours)   Basic Metabolic Panel    Collection Time: 25  1:15 AM   Result Value Ref Range    Sodium 140 136 - 145 mmol/L    Potassium 3.2 (L) 3.5 - 5.1 mmol/L    Chloride 107 97 - 108 mmol/L    CO2 25 21 - 32 mmol/L    Anion Gap 8 2 - 12 mmol/L    Glucose 133 (H) 65 - 100 mg/dL    BUN 34 (H) 6 - 20 mg/dL    Creatinine 2.14 (H) 0.70 - 1.30 mg/dL    BUN/Creatinine Ratio 16 12 - 20      Est, Glom Filt Rate 31 (L) >60 ml/min/1.73m2    Calcium 8.4 (L) 8.5 - 10.1 mg/dL   Magnesium    Collection Time: 25  1:15 AM   Result Value Ref Range    Magnesium 1.7 1.6 - 2.4 mg/dL   Phosphorus    Collection Time: 25  1:15 AM   Result Value Ref Range    Phosphorus 3.2 2.6 - 4.7 mg/dL   CBC with Auto Differential    Collection Time: 25 12:18 PM   Result Value Ref Range    WBC 20.4 (H) 4.1 - 11.1 K/uL    RBC 2.80 (L) 4.10 - 5.70 M/uL    Hemoglobin 8.7 (L) 12.1 - 17.0 g/dL    Hematocrit 27.3 (L) 36.6 - 50.3 %    MCV 97.5 80.0 - 99.0 FL    MCH 31.1 26.0 - 34.0 
pedal, radial  Capillary: brisk; pale      DATA:  No results found for this or any previous visit.    03/13/25    ECHO (TTE) LIMITED (PRN CONTRAST/BUBBLE/STRAIN/3D) 03/16/2025  5:53 PM (Final)    Interpretation Summary    Left Ventricle: Reduced left ventricular systolic function with a visually estimated EF of 25 - 30%. Left ventricle is dilated. Increased wall thickness. EF BP is 27%. Global hypokinesis present.    Aortic Valve: Trileaflet valve. Mildly thickened cusps. Mild regurgitation.    Image quality is fair. Contrast used: Lumason. Limited Doppler study due to patient's condition and procedure performed with the patient in a supine position.    Signed by: Kamran Hooks on 3/16/2025  5:53 PM       MAR reviewed and pertinent medications noted or modified as needed    MEDS:   Current Facility-Administered Medications   Medication Dose Route Frequency Provider Last Rate Last Admin    aspirin EC tablet 81 mg  81 mg Oral Daily Abebe Beach MD   81 mg at 03/21/25 1532    azithromycin (ZITHROMAX) tablet 250 mg  250 mg Oral Daily Bebeto Hendricks MD   250 mg at 03/21/25 1531    epoetin lev-epbx (RETACRIT) injection 10,000 Units  10,000 Units SubCUTAneous Weekly Carmen Orozco APRN - CNP   10,000 Units at 03/21/25 1814    furosemide (LASIX) tablet 20 mg  20 mg Oral Daily Abebe Beach MD   20 mg at 03/21/25 0835    piperacillin-tazobactam (ZOSYN) 3,375 mg in sodium chloride 0.9 % 50 mL IVPB (addEASE)  3,375 mg IntraVENous q8h Akash Moreland PA-C 12.5 mL/hr at 03/22/25 0529 3,375 mg at 03/22/25 0529    atorvastatin (LIPITOR) tablet 80 mg  80 mg Oral Nightly Fatemeh Li MD   80 mg at 03/21/25 2130    metoprolol tartrate (LOPRESSOR) tablet 25 mg  25 mg Oral BID Fatemeh Li MD   25 mg at 03/21/25 2130    pantoprazole (PROTONIX) tablet 40 mg  40 mg Oral BID Terrie Vicente MD   40 mg at 03/22/25 0704    sodium chloride flush 0.9 % injection 5-40 mL  5-40 mL IntraVENous 2 times per day [facility]

## 2025-03-28 NOTE — CARE COORDINATION
CM reviewed Pt medicals, Pt lives with his wife and was IND with ADL     [facility] has accepted Pt.     Will need HH order when Pt is D/C.    Pt has transfer orders to the floor.     11:30  Pt Nurse Cathy was telling CM that Pt wants to go home.     CM PS Davida Byersard to asked if Pt can be D/C today.  Davida stated that she needs to see Pt first.         Transition of Care Plan:    RUR: 19%  Prior Level of Functioning: IND  Disposition: Home with HH  SOHA: 3/28    Follow up appointments: Nursing  DME needed: Rolling walker, delivered to Pt room   Transportation at discharge: Family  IM/IMM Medicare/ letter given: Yes     Caregiver Contact: Yes  Discharge Caregiver contacted prior to discharge? Yes  Care Conference needed? No  Barriers to discharge: None

## 2025-03-29 DIAGNOSIS — K21.00 GASTROESOPHAGEAL REFLUX DISEASE WITH ESOPHAGITIS WITHOUT HEMORRHAGE: ICD-10-CM

## 2025-03-29 LAB
ALBUMIN SERPL-MCNC: 2.5 G/DL (ref 3.5–5)
ALBUMIN/GLOB SERPL: 0.6 (ref 1.1–2.2)
ALP SERPL-CCNC: 56 U/L (ref 45–117)
ALT SERPL-CCNC: 61 U/L (ref 12–78)
ANION GAP SERPL CALC-SCNC: 5 MMOL/L (ref 2–12)
APPEARANCE UR: CLEAR
AST SERPL W P-5'-P-CCNC: 41 U/L (ref 15–37)
BACTERIA URNS QL MICRO: NEGATIVE /HPF
BILIRUB SERPL-MCNC: 1 MG/DL (ref 0.2–1)
BILIRUB UR QL: NEGATIVE
BUN SERPL-MCNC: 46 MG/DL (ref 6–20)
BUN/CREAT SERPL: 22 (ref 12–20)
CA-I BLD-MCNC: 9.5 MG/DL (ref 8.5–10.1)
CHLORIDE SERPL-SCNC: 105 MMOL/L (ref 97–108)
CO2 SERPL-SCNC: 28 MMOL/L (ref 21–32)
COLOR UR: ABNORMAL
CREAT SERPL-MCNC: 2.11 MG/DL (ref 0.7–1.3)
CRP SERPL-MCNC: 5.8 MG/DL (ref 0–0.3)
EPITH CASTS URNS QL MICRO: ABNORMAL /LPF
ERYTHROCYTE [DISTWIDTH] IN BLOOD BY AUTOMATED COUNT: 16.7 % (ref 11.5–14.5)
GLOBULIN SER CALC-MCNC: 4.5 G/DL (ref 2–4)
GLUCOSE SERPL-MCNC: 125 MG/DL (ref 65–100)
GLUCOSE UR STRIP.AUTO-MCNC: NEGATIVE MG/DL
HCT VFR BLD AUTO: 31.1 % (ref 36.6–50.3)
HGB BLD-MCNC: 9.4 G/DL (ref 12.1–17)
HGB UR QL STRIP: NEGATIVE
KETONES UR QL STRIP.AUTO: NEGATIVE MG/DL
LEUKOCYTE ESTERASE UR QL STRIP.AUTO: NEGATIVE
MCH RBC QN AUTO: 31.3 PG (ref 26–34)
MCHC RBC AUTO-ENTMCNC: 30.2 G/DL (ref 30–36.5)
MCV RBC AUTO: 103.7 FL (ref 80–99)
MUCOUS THREADS URNS QL MICRO: ABNORMAL /LPF
NITRITE UR QL STRIP.AUTO: NEGATIVE
NRBC # BLD: 0 K/UL (ref 0–0.01)
NRBC BLD-RTO: 0 PER 100 WBC
PH UR STRIP: 6 (ref 5–8)
PLATELET # BLD AUTO: 400 K/UL (ref 150–400)
PMV BLD AUTO: 11.9 FL (ref 8.9–12.9)
POTASSIUM SERPL-SCNC: 4 MMOL/L (ref 3.5–5.1)
PROCALCITONIN SERPL-MCNC: 0.29 NG/ML
PROT SERPL-MCNC: 7 G/DL (ref 6.4–8.2)
PROT UR STRIP-MCNC: 30 MG/DL
RBC # BLD AUTO: 3 M/UL (ref 4.1–5.7)
RBC #/AREA URNS HPF: ABNORMAL /HPF (ref 0–5)
SODIUM SERPL-SCNC: 138 MMOL/L (ref 136–145)
SP GR UR REFRACTOMETRY: 1.02 (ref 1–1.03)
URINE CULTURE IF INDICATED: ABNORMAL
UROBILINOGEN UR QL STRIP.AUTO: 0.1 EU/DL (ref 0.1–1)
WBC # BLD AUTO: 18.2 K/UL (ref 4.1–11.1)
WBC URNS QL MICRO: ABNORMAL /HPF (ref 0–4)

## 2025-03-29 PROCEDURE — 87449 NOS EACH ORGANISM AG IA: CPT

## 2025-03-29 PROCEDURE — 99232 SBSQ HOSP IP/OBS MODERATE 35: CPT | Performed by: INTERNAL MEDICINE

## 2025-03-29 PROCEDURE — 1100000000 HC RM PRIVATE

## 2025-03-29 PROCEDURE — 2500000003 HC RX 250 WO HCPCS: Performed by: STUDENT IN AN ORGANIZED HEALTH CARE EDUCATION/TRAINING PROGRAM

## 2025-03-29 PROCEDURE — 6360000002 HC RX W HCPCS: Performed by: STUDENT IN AN ORGANIZED HEALTH CARE EDUCATION/TRAINING PROGRAM

## 2025-03-29 PROCEDURE — 2700000000 HC OXYGEN THERAPY PER DAY

## 2025-03-29 PROCEDURE — 6370000000 HC RX 637 (ALT 250 FOR IP): Performed by: INTERNAL MEDICINE

## 2025-03-29 PROCEDURE — 2580000003 HC RX 258

## 2025-03-29 PROCEDURE — 81001 URINALYSIS AUTO W/SCOPE: CPT

## 2025-03-29 PROCEDURE — 6360000002 HC RX W HCPCS

## 2025-03-29 PROCEDURE — 94762 N-INVAS EAR/PLS OXIMTRY CONT: CPT

## 2025-03-29 PROCEDURE — 85027 COMPLETE CBC AUTOMATED: CPT

## 2025-03-29 PROCEDURE — 94761 N-INVAS EAR/PLS OXIMETRY MLT: CPT

## 2025-03-29 PROCEDURE — 84145 PROCALCITONIN (PCT): CPT

## 2025-03-29 PROCEDURE — 94640 AIRWAY INHALATION TREATMENT: CPT

## 2025-03-29 PROCEDURE — 86140 C-REACTIVE PROTEIN: CPT

## 2025-03-29 PROCEDURE — 6370000000 HC RX 637 (ALT 250 FOR IP): Performed by: PHYSICIAN ASSISTANT

## 2025-03-29 PROCEDURE — 80053 COMPREHEN METABOLIC PANEL: CPT

## 2025-03-29 PROCEDURE — 6370000000 HC RX 637 (ALT 250 FOR IP)

## 2025-03-29 PROCEDURE — 6360000002 HC RX W HCPCS: Performed by: INTERNAL MEDICINE

## 2025-03-29 RX ORDER — FUROSEMIDE 40 MG/1
40 TABLET ORAL DAILY
Status: DISCONTINUED | OUTPATIENT
Start: 2025-03-30 | End: 2025-03-31

## 2025-03-29 RX ORDER — HYDROXYZINE PAMOATE 25 MG/1
25 CAPSULE ORAL 3 TIMES DAILY PRN
Status: DISCONTINUED | OUTPATIENT
Start: 2025-03-29 | End: 2025-04-03 | Stop reason: HOSPADM

## 2025-03-29 RX ADMIN — PANTOPRAZOLE SODIUM 40 MG: 40 TABLET, DELAYED RELEASE ORAL at 06:53

## 2025-03-29 RX ADMIN — SODIUM CHLORIDE, PRESERVATIVE FREE 10 ML: 5 INJECTION INTRAVENOUS at 21:45

## 2025-03-29 RX ADMIN — MEROPENEM 1000 MG: 1 INJECTION INTRAVENOUS at 08:50

## 2025-03-29 RX ADMIN — BUDESONIDE INHALATION 500 MCG: 0.5 SUSPENSION RESPIRATORY (INHALATION) at 08:55

## 2025-03-29 RX ADMIN — MIDODRINE HYDROCHLORIDE 5 MG: 5 TABLET ORAL at 08:46

## 2025-03-29 RX ADMIN — MIDODRINE HYDROCHLORIDE 5 MG: 5 TABLET ORAL at 11:16

## 2025-03-29 RX ADMIN — LORAZEPAM 1 MG: 1 TABLET ORAL at 14:10

## 2025-03-29 RX ADMIN — BUDESONIDE INHALATION 500 MCG: 0.5 SUSPENSION RESPIRATORY (INHALATION) at 20:21

## 2025-03-29 RX ADMIN — METOPROLOL TARTRATE 25 MG: 25 TABLET, FILM COATED ORAL at 21:35

## 2025-03-29 RX ADMIN — ERGOCALCIFEROL 50000 UNITS: 1.25 CAPSULE ORAL at 08:46

## 2025-03-29 RX ADMIN — ATORVASTATIN CALCIUM 80 MG: 40 TABLET, FILM COATED ORAL at 21:35

## 2025-03-29 RX ADMIN — IPRATROPIUM BROMIDE AND ALBUTEROL SULFATE 1 DOSE: 2.5; .5 SOLUTION RESPIRATORY (INHALATION) at 20:21

## 2025-03-29 RX ADMIN — LORAZEPAM 1 MG: 1 TABLET ORAL at 21:33

## 2025-03-29 RX ADMIN — ASPIRIN 81 MG: 81 TABLET, COATED ORAL at 08:46

## 2025-03-29 RX ADMIN — HEPARIN SODIUM 5000 UNITS: 5000 INJECTION INTRAVENOUS; SUBCUTANEOUS at 06:53

## 2025-03-29 RX ADMIN — METOPROLOL TARTRATE 25 MG: 25 TABLET, FILM COATED ORAL at 08:46

## 2025-03-29 RX ADMIN — HYDROXYZINE PAMOATE 25 MG: 25 CAPSULE ORAL at 11:16

## 2025-03-29 RX ADMIN — IPRATROPIUM BROMIDE AND ALBUTEROL SULFATE 1 DOSE: 2.5; .5 SOLUTION RESPIRATORY (INHALATION) at 08:55

## 2025-03-29 RX ADMIN — SODIUM CHLORIDE, PRESERVATIVE FREE 10 ML: 5 INJECTION INTRAVENOUS at 08:47

## 2025-03-29 RX ADMIN — HEPARIN SODIUM 5000 UNITS: 5000 INJECTION INTRAVENOUS; SUBCUTANEOUS at 14:12

## 2025-03-29 RX ADMIN — HEPARIN SODIUM 5000 UNITS: 5000 INJECTION INTRAVENOUS; SUBCUTANEOUS at 21:36

## 2025-03-29 RX ADMIN — IPRATROPIUM BROMIDE AND ALBUTEROL SULFATE 1 DOSE: 2.5; .5 SOLUTION RESPIRATORY (INHALATION) at 13:47

## 2025-03-29 RX ADMIN — MEROPENEM 1000 MG: 1 INJECTION INTRAVENOUS at 21:37

## 2025-03-29 RX ADMIN — CALCITRIOL CAPSULES 0.25 MCG 0.25 MCG: 0.25 CAPSULE ORAL at 08:46

## 2025-03-30 ENCOUNTER — APPOINTMENT (OUTPATIENT)
Facility: HOSPITAL | Age: 80
DRG: 380 | End: 2025-03-30
Attending: STUDENT IN AN ORGANIZED HEALTH CARE EDUCATION/TRAINING PROGRAM
Payer: MEDICARE

## 2025-03-30 LAB
ALBUMIN SERPL-MCNC: 2.5 G/DL (ref 3.5–5)
ALBUMIN/GLOB SERPL: 0.6 (ref 1.1–2.2)
ALP SERPL-CCNC: 54 U/L (ref 45–117)
ALT SERPL-CCNC: 56 U/L (ref 12–78)
ANION GAP SERPL CALC-SCNC: 8 MMOL/L (ref 2–12)
AST SERPL W P-5'-P-CCNC: 38 U/L (ref 15–37)
BACTERIA SPEC CULT: NORMAL
BILIRUB SERPL-MCNC: 1 MG/DL (ref 0.2–1)
BUN SERPL-MCNC: 47 MG/DL (ref 6–20)
BUN/CREAT SERPL: 21 (ref 12–20)
CA-I BLD-MCNC: 9.5 MG/DL (ref 8.5–10.1)
CHLORIDE SERPL-SCNC: 106 MMOL/L (ref 97–108)
CO2 SERPL-SCNC: 26 MMOL/L (ref 21–32)
CREAT SERPL-MCNC: 2.19 MG/DL (ref 0.7–1.3)
CRP SERPL-MCNC: 7.52 MG/DL (ref 0–0.3)
D DIMER PPP FEU-MCNC: 2.1 UG/ML(FEU)
ERYTHROCYTE [DISTWIDTH] IN BLOOD BY AUTOMATED COUNT: 16.7 % (ref 11.5–14.5)
GLOBULIN SER CALC-MCNC: 4.3 G/DL (ref 2–4)
GLUCOSE SERPL-MCNC: 129 MG/DL (ref 65–100)
HCT VFR BLD AUTO: 32 % (ref 36.6–50.3)
HGB BLD-MCNC: 9.8 G/DL (ref 12.1–17)
Lab: NORMAL
MCH RBC QN AUTO: 31.8 PG (ref 26–34)
MCHC RBC AUTO-ENTMCNC: 30.6 G/DL (ref 30–36.5)
MCV RBC AUTO: 103.9 FL (ref 80–99)
NRBC # BLD: 0 K/UL (ref 0–0.01)
NRBC BLD-RTO: 0 PER 100 WBC
PLATELET # BLD AUTO: 350 K/UL (ref 150–400)
PMV BLD AUTO: 12.2 FL (ref 8.9–12.9)
POTASSIUM SERPL-SCNC: 3.8 MMOL/L (ref 3.5–5.1)
PROCALCITONIN SERPL-MCNC: 0.33 NG/ML
PROT SERPL-MCNC: 6.8 G/DL (ref 6.4–8.2)
RBC # BLD AUTO: 3.08 M/UL (ref 4.1–5.7)
SODIUM SERPL-SCNC: 140 MMOL/L (ref 136–145)
T4 FREE SERPL-MCNC: 1.6 NG/DL (ref 0.8–1.5)
TSH SERPL DL<=0.05 MIU/L-ACNC: 2.06 UIU/ML (ref 0.36–3.74)
WBC # BLD AUTO: 13.4 K/UL (ref 4.1–11.1)

## 2025-03-30 PROCEDURE — 1100000000 HC RM PRIVATE

## 2025-03-30 PROCEDURE — 6370000000 HC RX 637 (ALT 250 FOR IP): Performed by: INTERNAL MEDICINE

## 2025-03-30 PROCEDURE — 85379 FIBRIN DEGRADATION QUANT: CPT

## 2025-03-30 PROCEDURE — 94640 AIRWAY INHALATION TREATMENT: CPT

## 2025-03-30 PROCEDURE — 6360000002 HC RX W HCPCS: Performed by: INTERNAL MEDICINE

## 2025-03-30 PROCEDURE — 2700000000 HC OXYGEN THERAPY PER DAY

## 2025-03-30 PROCEDURE — 84443 ASSAY THYROID STIM HORMONE: CPT

## 2025-03-30 PROCEDURE — 2580000003 HC RX 258

## 2025-03-30 PROCEDURE — 6360000002 HC RX W HCPCS: Performed by: STUDENT IN AN ORGANIZED HEALTH CARE EDUCATION/TRAINING PROGRAM

## 2025-03-30 PROCEDURE — 86140 C-REACTIVE PROTEIN: CPT

## 2025-03-30 PROCEDURE — 6360000002 HC RX W HCPCS

## 2025-03-30 PROCEDURE — 97530 THERAPEUTIC ACTIVITIES: CPT

## 2025-03-30 PROCEDURE — 94761 N-INVAS EAR/PLS OXIMETRY MLT: CPT

## 2025-03-30 PROCEDURE — 85027 COMPLETE CBC AUTOMATED: CPT

## 2025-03-30 PROCEDURE — 99232 SBSQ HOSP IP/OBS MODERATE 35: CPT | Performed by: INTERNAL MEDICINE

## 2025-03-30 PROCEDURE — 6370000000 HC RX 637 (ALT 250 FOR IP): Performed by: NURSE PRACTITIONER

## 2025-03-30 PROCEDURE — 36415 COLL VENOUS BLD VENIPUNCTURE: CPT

## 2025-03-30 PROCEDURE — 71045 X-RAY EXAM CHEST 1 VIEW: CPT

## 2025-03-30 PROCEDURE — 84145 PROCALCITONIN (PCT): CPT

## 2025-03-30 PROCEDURE — 2500000003 HC RX 250 WO HCPCS: Performed by: STUDENT IN AN ORGANIZED HEALTH CARE EDUCATION/TRAINING PROGRAM

## 2025-03-30 PROCEDURE — 6370000000 HC RX 637 (ALT 250 FOR IP)

## 2025-03-30 PROCEDURE — 80053 COMPREHEN METABOLIC PANEL: CPT

## 2025-03-30 PROCEDURE — 84439 ASSAY OF FREE THYROXINE: CPT

## 2025-03-30 RX ADMIN — SODIUM CHLORIDE, PRESERVATIVE FREE 10 ML: 5 INJECTION INTRAVENOUS at 20:42

## 2025-03-30 RX ADMIN — ATORVASTATIN CALCIUM 80 MG: 40 TABLET, FILM COATED ORAL at 20:41

## 2025-03-30 RX ADMIN — HEPARIN SODIUM 5000 UNITS: 5000 INJECTION INTRAVENOUS; SUBCUTANEOUS at 14:44

## 2025-03-30 RX ADMIN — IPRATROPIUM BROMIDE AND ALBUTEROL SULFATE 1 DOSE: 2.5; .5 SOLUTION RESPIRATORY (INHALATION) at 01:29

## 2025-03-30 RX ADMIN — SODIUM CHLORIDE, PRESERVATIVE FREE 10 ML: 5 INJECTION INTRAVENOUS at 10:11

## 2025-03-30 RX ADMIN — CALCITRIOL CAPSULES 0.25 MCG 0.25 MCG: 0.25 CAPSULE ORAL at 10:14

## 2025-03-30 RX ADMIN — BUDESONIDE INHALATION 500 MCG: 0.5 SUSPENSION RESPIRATORY (INHALATION) at 08:20

## 2025-03-30 RX ADMIN — IPRATROPIUM BROMIDE AND ALBUTEROL SULFATE 1 DOSE: 2.5; .5 SOLUTION RESPIRATORY (INHALATION) at 13:35

## 2025-03-30 RX ADMIN — IPRATROPIUM BROMIDE AND ALBUTEROL SULFATE 1 DOSE: 2.5; .5 SOLUTION RESPIRATORY (INHALATION) at 19:26

## 2025-03-30 RX ADMIN — FUROSEMIDE 40 MG: 40 TABLET ORAL at 10:14

## 2025-03-30 RX ADMIN — HEPARIN SODIUM 5000 UNITS: 5000 INJECTION INTRAVENOUS; SUBCUTANEOUS at 22:16

## 2025-03-30 RX ADMIN — METOPROLOL TARTRATE 25 MG: 25 TABLET, FILM COATED ORAL at 20:41

## 2025-03-30 RX ADMIN — ASPIRIN 81 MG: 81 TABLET, COATED ORAL at 10:14

## 2025-03-30 RX ADMIN — MEROPENEM 1000 MG: 1 INJECTION INTRAVENOUS at 20:47

## 2025-03-30 RX ADMIN — MEROPENEM 1000 MG: 1 INJECTION INTRAVENOUS at 10:17

## 2025-03-30 RX ADMIN — HEPARIN SODIUM 5000 UNITS: 5000 INJECTION INTRAVENOUS; SUBCUTANEOUS at 06:21

## 2025-03-30 RX ADMIN — IPRATROPIUM BROMIDE AND ALBUTEROL SULFATE 1 DOSE: 2.5; .5 SOLUTION RESPIRATORY (INHALATION) at 08:20

## 2025-03-30 RX ADMIN — Medication 5 MG: at 20:43

## 2025-03-30 RX ADMIN — BUDESONIDE INHALATION 500 MCG: 0.5 SUSPENSION RESPIRATORY (INHALATION) at 19:25

## 2025-03-30 RX ADMIN — METOPROLOL TARTRATE 25 MG: 25 TABLET, FILM COATED ORAL at 10:14

## 2025-03-30 RX ADMIN — PANTOPRAZOLE SODIUM 40 MG: 40 TABLET, DELAYED RELEASE ORAL at 06:21

## 2025-03-31 ENCOUNTER — APPOINTMENT (OUTPATIENT)
Facility: HOSPITAL | Age: 80
DRG: 380 | End: 2025-03-31
Attending: STUDENT IN AN ORGANIZED HEALTH CARE EDUCATION/TRAINING PROGRAM
Payer: MEDICARE

## 2025-03-31 LAB
ALBUMIN SERPL-MCNC: 2.6 G/DL (ref 3.5–5)
ALBUMIN/GLOB SERPL: 0.6 (ref 1.1–2.2)
ALP SERPL-CCNC: 53 U/L (ref 45–117)
ALT SERPL-CCNC: 47 U/L (ref 12–78)
ANION GAP SERPL CALC-SCNC: 7 MMOL/L (ref 2–12)
AST SERPL W P-5'-P-CCNC: 31 U/L (ref 15–37)
BILIRUB SERPL-MCNC: 0.8 MG/DL (ref 0.2–1)
BNP SERPL-MCNC: ABNORMAL PG/ML
BUN SERPL-MCNC: 54 MG/DL (ref 6–20)
BUN/CREAT SERPL: 24 (ref 12–20)
CA-I BLD-MCNC: 9.4 MG/DL (ref 8.5–10.1)
CHLORIDE SERPL-SCNC: 106 MMOL/L (ref 97–108)
CO2 SERPL-SCNC: 26 MMOL/L (ref 21–32)
CREAT SERPL-MCNC: 2.29 MG/DL (ref 0.7–1.3)
CRP SERPL-MCNC: 6.53 MG/DL (ref 0–0.3)
ERYTHROCYTE [DISTWIDTH] IN BLOOD BY AUTOMATED COUNT: 16.4 % (ref 11.5–14.5)
GLOBULIN SER CALC-MCNC: 4.2 G/DL (ref 2–4)
GLUCOSE SERPL-MCNC: 130 MG/DL (ref 65–100)
HCT VFR BLD AUTO: 31.5 % (ref 36.6–50.3)
HGB BLD-MCNC: 9.6 G/DL (ref 12.1–17)
L PNEUMO1 AG UR QL IA: NEGATIVE
MCH RBC QN AUTO: 31.2 PG (ref 26–34)
MCHC RBC AUTO-ENTMCNC: 30.5 G/DL (ref 30–36.5)
MCV RBC AUTO: 102.3 FL (ref 80–99)
NRBC # BLD: 0 K/UL (ref 0–0.01)
NRBC BLD-RTO: 0 PER 100 WBC
PHOSPHATE SERPL-MCNC: 4.7 MG/DL (ref 2.6–4.7)
PLATELET # BLD AUTO: 304 K/UL (ref 150–400)
PMV BLD AUTO: 12 FL (ref 8.9–12.9)
POTASSIUM SERPL-SCNC: 4.2 MMOL/L (ref 3.5–5.1)
PROCALCITONIN SERPL-MCNC: 0.38 NG/ML
PROT SERPL-MCNC: 6.8 G/DL (ref 6.4–8.2)
RBC # BLD AUTO: 3.08 M/UL (ref 4.1–5.7)
SODIUM SERPL-SCNC: 139 MMOL/L (ref 136–145)
SPECIMEN SOURCE: NORMAL
WBC # BLD AUTO: 12 K/UL (ref 4.1–11.1)

## 2025-03-31 PROCEDURE — 6370000000 HC RX 637 (ALT 250 FOR IP): Performed by: INTERNAL MEDICINE

## 2025-03-31 PROCEDURE — P9047 ALBUMIN (HUMAN), 25%, 50ML: HCPCS | Performed by: PHYSICIAN ASSISTANT

## 2025-03-31 PROCEDURE — 6360000002 HC RX W HCPCS

## 2025-03-31 PROCEDURE — 84145 PROCALCITONIN (PCT): CPT

## 2025-03-31 PROCEDURE — 6360000002 HC RX W HCPCS: Performed by: STUDENT IN AN ORGANIZED HEALTH CARE EDUCATION/TRAINING PROGRAM

## 2025-03-31 PROCEDURE — 3430000000 HC RX DIAGNOSTIC RADIOPHARMACEUTICAL

## 2025-03-31 PROCEDURE — 86140 C-REACTIVE PROTEIN: CPT

## 2025-03-31 PROCEDURE — 6360000002 HC RX W HCPCS: Performed by: PHYSICIAN ASSISTANT

## 2025-03-31 PROCEDURE — 1100000000 HC RM PRIVATE

## 2025-03-31 PROCEDURE — 6360000002 HC RX W HCPCS: Performed by: INTERNAL MEDICINE

## 2025-03-31 PROCEDURE — A9540 TC99M MAA: HCPCS

## 2025-03-31 PROCEDURE — 78580 LUNG PERFUSION IMAGING: CPT

## 2025-03-31 PROCEDURE — 99232 SBSQ HOSP IP/OBS MODERATE 35: CPT | Performed by: INTERNAL MEDICINE

## 2025-03-31 PROCEDURE — 94640 AIRWAY INHALATION TREATMENT: CPT

## 2025-03-31 PROCEDURE — 2580000003 HC RX 258

## 2025-03-31 PROCEDURE — 2700000000 HC OXYGEN THERAPY PER DAY

## 2025-03-31 PROCEDURE — 84100 ASSAY OF PHOSPHORUS: CPT

## 2025-03-31 PROCEDURE — 2500000003 HC RX 250 WO HCPCS: Performed by: STUDENT IN AN ORGANIZED HEALTH CARE EDUCATION/TRAINING PROGRAM

## 2025-03-31 PROCEDURE — 6370000000 HC RX 637 (ALT 250 FOR IP)

## 2025-03-31 PROCEDURE — 83880 ASSAY OF NATRIURETIC PEPTIDE: CPT

## 2025-03-31 PROCEDURE — 36415 COLL VENOUS BLD VENIPUNCTURE: CPT

## 2025-03-31 PROCEDURE — 80053 COMPREHEN METABOLIC PANEL: CPT

## 2025-03-31 PROCEDURE — 85027 COMPLETE CBC AUTOMATED: CPT

## 2025-03-31 RX ORDER — FUROSEMIDE 10 MG/ML
40 INJECTION INTRAMUSCULAR; INTRAVENOUS DAILY
Status: DISCONTINUED | OUTPATIENT
Start: 2025-03-31 | End: 2025-04-01

## 2025-03-31 RX ORDER — PANTOPRAZOLE SODIUM 40 MG/1
40 TABLET, DELAYED RELEASE ORAL DAILY
Qty: 90 TABLET | Refills: 0 | OUTPATIENT
Start: 2025-03-31

## 2025-03-31 RX ORDER — ALBUMIN (HUMAN) 12.5 G/50ML
25 SOLUTION INTRAVENOUS ONCE
Status: COMPLETED | OUTPATIENT
Start: 2025-03-31 | End: 2025-03-31

## 2025-03-31 RX ORDER — IPRATROPIUM BROMIDE AND ALBUTEROL SULFATE 2.5; .5 MG/3ML; MG/3ML
1 SOLUTION RESPIRATORY (INHALATION)
Status: DISCONTINUED | OUTPATIENT
Start: 2025-03-31 | End: 2025-04-03 | Stop reason: HOSPADM

## 2025-03-31 RX ADMIN — BUDESONIDE INHALATION 500 MCG: 0.5 SUSPENSION RESPIRATORY (INHALATION) at 07:01

## 2025-03-31 RX ADMIN — ASPIRIN 81 MG: 81 TABLET, COATED ORAL at 09:21

## 2025-03-31 RX ADMIN — PANTOPRAZOLE SODIUM 40 MG: 40 TABLET, DELAYED RELEASE ORAL at 09:21

## 2025-03-31 RX ADMIN — ATORVASTATIN CALCIUM 80 MG: 40 TABLET, FILM COATED ORAL at 20:52

## 2025-03-31 RX ADMIN — HEPARIN SODIUM 5000 UNITS: 5000 INJECTION INTRAVENOUS; SUBCUTANEOUS at 20:53

## 2025-03-31 RX ADMIN — METOPROLOL TARTRATE 25 MG: 25 TABLET, FILM COATED ORAL at 20:52

## 2025-03-31 RX ADMIN — FUROSEMIDE 40 MG: 10 INJECTION, SOLUTION INTRAMUSCULAR; INTRAVENOUS at 15:39

## 2025-03-31 RX ADMIN — SODIUM CHLORIDE, PRESERVATIVE FREE 10 ML: 5 INJECTION INTRAVENOUS at 09:21

## 2025-03-31 RX ADMIN — MEROPENEM 1000 MG: 1 INJECTION INTRAVENOUS at 20:57

## 2025-03-31 RX ADMIN — BUDESONIDE INHALATION 500 MCG: 0.5 SUSPENSION RESPIRATORY (INHALATION) at 20:37

## 2025-03-31 RX ADMIN — IPRATROPIUM BROMIDE AND ALBUTEROL SULFATE 1 DOSE: 2.5; .5 SOLUTION RESPIRATORY (INHALATION) at 20:37

## 2025-03-31 RX ADMIN — HEPARIN SODIUM 5000 UNITS: 5000 INJECTION INTRAVENOUS; SUBCUTANEOUS at 06:17

## 2025-03-31 RX ADMIN — ALBUMIN (HUMAN) 25 G: 0.25 INJECTION, SOLUTION INTRAVENOUS at 15:43

## 2025-03-31 RX ADMIN — CALCITRIOL CAPSULES 0.25 MCG 0.25 MCG: 0.25 CAPSULE ORAL at 09:21

## 2025-03-31 RX ADMIN — KIT FOR THE PREPARATION OF TECHNETIUM TC 99M ALBUMIN AGGREGATED 4.19 MILLICURIE: 2.5 INJECTION, POWDER, FOR SOLUTION INTRAVENOUS at 14:30

## 2025-03-31 RX ADMIN — MEROPENEM 1000 MG: 1 INJECTION INTRAVENOUS at 09:23

## 2025-03-31 RX ADMIN — FUROSEMIDE 40 MG: 40 TABLET ORAL at 09:21

## 2025-03-31 RX ADMIN — METOPROLOL TARTRATE 25 MG: 25 TABLET, FILM COATED ORAL at 09:21

## 2025-03-31 RX ADMIN — IPRATROPIUM BROMIDE AND ALBUTEROL SULFATE 1 DOSE: 2.5; .5 SOLUTION RESPIRATORY (INHALATION) at 07:01

## 2025-03-31 RX ADMIN — IPRATROPIUM BROMIDE AND ALBUTEROL SULFATE 1 DOSE: 2.5; .5 SOLUTION RESPIRATORY (INHALATION) at 02:05

## 2025-03-31 RX ADMIN — HEPARIN SODIUM 5000 UNITS: 5000 INJECTION INTRAVENOUS; SUBCUTANEOUS at 15:39

## 2025-03-31 ASSESSMENT — ENCOUNTER SYMPTOMS
VOMITING: 0
GASTROINTESTINAL NEGATIVE: 1
COUGH: 1
ABDOMINAL PAIN: 0
NAUSEA: 0
DIARRHEA: 0
SHORTNESS OF BREATH: 1

## 2025-03-31 NOTE — CARE COORDINATION
CM reviewed chart.     DCP is home with [facility].  Updates sent via [employer].     Per IDR, patient likely has 48 hours before they are ready for dc. Barriers: VQ Scan, improving kidney function, pulm and ID clearance, weaning O2.     CM will continue to follow.

## 2025-04-01 ENCOUNTER — APPOINTMENT (OUTPATIENT)
Facility: HOSPITAL | Age: 80
DRG: 380 | End: 2025-04-01
Attending: STUDENT IN AN ORGANIZED HEALTH CARE EDUCATION/TRAINING PROGRAM
Payer: MEDICARE

## 2025-04-01 ENCOUNTER — APPOINTMENT (OUTPATIENT)
Facility: HOSPITAL | Age: 80
DRG: 380 | End: 2025-04-01
Attending: INTERNAL MEDICINE
Payer: MEDICARE

## 2025-04-01 LAB
ALBUMIN SERPL-MCNC: 2.7 G/DL (ref 3.5–5)
ALBUMIN/GLOB SERPL: 0.7 (ref 1.1–2.2)
ALP SERPL-CCNC: 72 U/L (ref 45–117)
ALT SERPL-CCNC: 40 U/L (ref 12–78)
ANION GAP SERPL CALC-SCNC: 8 MMOL/L (ref 2–12)
AST SERPL W P-5'-P-CCNC: 29 U/L (ref 15–37)
BILIRUB SERPL-MCNC: 0.7 MG/DL (ref 0.2–1)
BUN SERPL-MCNC: 58 MG/DL (ref 6–20)
BUN/CREAT SERPL: 22 (ref 12–20)
CA-I BLD-MCNC: 9.4 MG/DL (ref 8.5–10.1)
CHLORIDE SERPL-SCNC: 106 MMOL/L (ref 97–108)
CO2 SERPL-SCNC: 26 MMOL/L (ref 21–32)
CREAT SERPL-MCNC: 2.61 MG/DL (ref 0.7–1.3)
CRP SERPL-MCNC: 4.22 MG/DL (ref 0–0.3)
EKG ATRIAL RATE: 101 BPM
EKG DIAGNOSIS: NORMAL
EKG P AXIS: 57 DEGREES
EKG P-R INTERVAL: 162 MS
EKG Q-T INTERVAL: 388 MS
EKG QRS DURATION: 88 MS
EKG QTC CALCULATION (BAZETT): 503 MS
EKG R AXIS: 79 DEGREES
EKG T AXIS: 149 DEGREES
EKG VENTRICULAR RATE: 101 BPM
ERYTHROCYTE [DISTWIDTH] IN BLOOD BY AUTOMATED COUNT: 16.4 % (ref 11.5–14.5)
GLOBULIN SER CALC-MCNC: 4 G/DL (ref 2–4)
GLUCOSE BLD STRIP.AUTO-MCNC: 166 MG/DL (ref 65–100)
GLUCOSE SERPL-MCNC: 148 MG/DL (ref 65–100)
HCT VFR BLD AUTO: 31 % (ref 36.6–50.3)
HGB BLD-MCNC: 9.5 G/DL (ref 12.1–17)
MCH RBC QN AUTO: 31.5 PG (ref 26–34)
MCHC RBC AUTO-ENTMCNC: 30.6 G/DL (ref 30–36.5)
MCV RBC AUTO: 102.6 FL (ref 80–99)
NRBC # BLD: 0.03 K/UL (ref 0–0.01)
NRBC BLD-RTO: 0.2 PER 100 WBC
PERFORMED BY:: ABNORMAL
PLATELET # BLD AUTO: 262 K/UL (ref 150–400)
PMV BLD AUTO: 12.2 FL (ref 8.9–12.9)
POTASSIUM SERPL-SCNC: 4.6 MMOL/L (ref 3.5–5.1)
PROCALCITONIN SERPL-MCNC: 0.41 NG/ML
PROT SERPL-MCNC: 6.7 G/DL (ref 6.4–8.2)
RBC # BLD AUTO: 3.02 M/UL (ref 4.1–5.7)
SODIUM SERPL-SCNC: 140 MMOL/L (ref 136–145)
WBC # BLD AUTO: 13.1 K/UL (ref 4.1–11.1)

## 2025-04-01 PROCEDURE — 6370000000 HC RX 637 (ALT 250 FOR IP): Performed by: INTERNAL MEDICINE

## 2025-04-01 PROCEDURE — 93005 ELECTROCARDIOGRAM TRACING: CPT | Performed by: PHYSICIAN ASSISTANT

## 2025-04-01 PROCEDURE — 71045 X-RAY EXAM CHEST 1 VIEW: CPT

## 2025-04-01 PROCEDURE — C1729 CATH, DRAINAGE: HCPCS

## 2025-04-01 PROCEDURE — 94761 N-INVAS EAR/PLS OXIMETRY MLT: CPT

## 2025-04-01 PROCEDURE — 88305 TISSUE EXAM BY PATHOLOGIST: CPT

## 2025-04-01 PROCEDURE — 86140 C-REACTIVE PROTEIN: CPT

## 2025-04-01 PROCEDURE — 82962 GLUCOSE BLOOD TEST: CPT

## 2025-04-01 PROCEDURE — 6370000000 HC RX 637 (ALT 250 FOR IP)

## 2025-04-01 PROCEDURE — 6360000002 HC RX W HCPCS: Performed by: STUDENT IN AN ORGANIZED HEALTH CARE EDUCATION/TRAINING PROGRAM

## 2025-04-01 PROCEDURE — 6360000002 HC RX W HCPCS

## 2025-04-01 PROCEDURE — 6360000002 HC RX W HCPCS: Performed by: PHYSICIAN ASSISTANT

## 2025-04-01 PROCEDURE — 6360000002 HC RX W HCPCS: Performed by: INTERNAL MEDICINE

## 2025-04-01 PROCEDURE — 2580000003 HC RX 258

## 2025-04-01 PROCEDURE — 88112 CYTOPATH CELL ENHANCE TECH: CPT

## 2025-04-01 PROCEDURE — 2500000003 HC RX 250 WO HCPCS: Performed by: STUDENT IN AN ORGANIZED HEALTH CARE EDUCATION/TRAINING PROGRAM

## 2025-04-01 PROCEDURE — 80053 COMPREHEN METABOLIC PANEL: CPT

## 2025-04-01 PROCEDURE — 1100000000 HC RM PRIVATE

## 2025-04-01 PROCEDURE — 97530 THERAPEUTIC ACTIVITIES: CPT

## 2025-04-01 PROCEDURE — 84145 PROCALCITONIN (PCT): CPT

## 2025-04-01 PROCEDURE — 36415 COLL VENOUS BLD VENIPUNCTURE: CPT

## 2025-04-01 PROCEDURE — 94640 AIRWAY INHALATION TREATMENT: CPT

## 2025-04-01 PROCEDURE — 99232 SBSQ HOSP IP/OBS MODERATE 35: CPT | Performed by: INTERNAL MEDICINE

## 2025-04-01 PROCEDURE — 2700000000 HC OXYGEN THERAPY PER DAY

## 2025-04-01 PROCEDURE — 85027 COMPLETE CBC AUTOMATED: CPT

## 2025-04-01 RX ORDER — FUROSEMIDE 10 MG/ML
80 INJECTION INTRAMUSCULAR; INTRAVENOUS ONCE
Status: COMPLETED | OUTPATIENT
Start: 2025-04-01 | End: 2025-04-01

## 2025-04-01 RX ORDER — FUROSEMIDE 10 MG/ML
40 INJECTION INTRAMUSCULAR; INTRAVENOUS 2 TIMES DAILY
Status: DISCONTINUED | OUTPATIENT
Start: 2025-04-01 | End: 2025-04-02

## 2025-04-01 RX ADMIN — FUROSEMIDE 40 MG: 10 INJECTION, SOLUTION INTRAMUSCULAR; INTRAVENOUS at 17:24

## 2025-04-01 RX ADMIN — METOPROLOL TARTRATE 25 MG: 25 TABLET, FILM COATED ORAL at 21:14

## 2025-04-01 RX ADMIN — METOPROLOL TARTRATE 25 MG: 25 TABLET, FILM COATED ORAL at 09:25

## 2025-04-01 RX ADMIN — HEPARIN SODIUM 5000 UNITS: 5000 INJECTION INTRAVENOUS; SUBCUTANEOUS at 04:24

## 2025-04-01 RX ADMIN — HYDROXYZINE PAMOATE 25 MG: 25 CAPSULE ORAL at 03:49

## 2025-04-01 RX ADMIN — IPRATROPIUM BROMIDE AND ALBUTEROL SULFATE 1 DOSE: 2.5; .5 SOLUTION RESPIRATORY (INHALATION) at 19:37

## 2025-04-01 RX ADMIN — FUROSEMIDE 80 MG: 10 INJECTION, SOLUTION INTRAMUSCULAR; INTRAVENOUS at 04:23

## 2025-04-01 RX ADMIN — MEROPENEM 1000 MG: 1 INJECTION INTRAVENOUS at 21:18

## 2025-04-01 RX ADMIN — HEPARIN SODIUM 5000 UNITS: 5000 INJECTION INTRAVENOUS; SUBCUTANEOUS at 21:14

## 2025-04-01 RX ADMIN — BUDESONIDE INHALATION 500 MCG: 0.5 SUSPENSION RESPIRATORY (INHALATION) at 08:56

## 2025-04-01 RX ADMIN — BUDESONIDE INHALATION 500 MCG: 0.5 SUSPENSION RESPIRATORY (INHALATION) at 19:37

## 2025-04-01 RX ADMIN — MEROPENEM 1000 MG: 1 INJECTION INTRAVENOUS at 09:33

## 2025-04-01 RX ADMIN — ATORVASTATIN CALCIUM 80 MG: 40 TABLET, FILM COATED ORAL at 21:14

## 2025-04-01 RX ADMIN — CALCITRIOL CAPSULES 0.25 MCG 0.25 MCG: 0.25 CAPSULE ORAL at 09:31

## 2025-04-01 RX ADMIN — PANTOPRAZOLE SODIUM 40 MG: 40 TABLET, DELAYED RELEASE ORAL at 06:32

## 2025-04-01 RX ADMIN — ASPIRIN 81 MG: 81 TABLET, COATED ORAL at 09:25

## 2025-04-01 RX ADMIN — FUROSEMIDE 40 MG: 10 INJECTION, SOLUTION INTRAMUSCULAR; INTRAVENOUS at 09:25

## 2025-04-01 RX ADMIN — SODIUM CHLORIDE, PRESERVATIVE FREE 10 ML: 5 INJECTION INTRAVENOUS at 09:33

## 2025-04-01 RX ADMIN — IPRATROPIUM BROMIDE AND ALBUTEROL SULFATE 1 DOSE: 2.5; .5 SOLUTION RESPIRATORY (INHALATION) at 08:56

## 2025-04-01 ASSESSMENT — ENCOUNTER SYMPTOMS
COUGH: 1
SHORTNESS OF BREATH: 1
DIARRHEA: 0
GASTROINTESTINAL NEGATIVE: 1
ABDOMINAL PAIN: 0
VOMITING: 0
NAUSEA: 0

## 2025-04-01 NOTE — SIGNIFICANT EVENT
Overnight coverage:    Patient developed acute respiratory failure with worsening oxygen requirement.  Placed on 100% oxygen via nonrebreather due to workload and hypoxia with oxygen saturation less than 88%.  Stat chest x-ray revealed worsening pleural effusions and pulmonary edema.  Patient was given a large dose of Lasix and may require inotropic medications with Lasix drip.  At this point patient's respiratory status needs to override the patient's creatinine.  Would suggest increasing diuresis and new normal may be 3.5-4 although will be able to wean oxygen appropriately.

## 2025-04-01 NOTE — OR NURSING
Patient to IR For Thoracentesis  Patient identified, site verified/prepped  Procedure complete without complication  2000 mL clear yellow fluid drained from RIGHT pleural space  Specimens sent to lab as ordered  Patient tolerated procedure  Post imaging ordered/pending  Patient transported back to nursing unit

## 2025-04-01 NOTE — WOUND CARE
N/A 3/14/2025    ESOPHAGOGASTRODUODENOSCOPY performed by Aki Parsons MD at [facility] ENDOSCOPY       FAMILY HISTORY    Family History   Problem Relation Age of Onset    No Known Problems Father     Heart Disease Mother     Hypertension Other          ALLERGIES    No Known Allergies    MEDICATIONS    No current facility-administered medications on file prior to encounter.     Current Outpatient Medications on File Prior to Encounter   Medication Sig Dispense Refill    pantoprazole (PROTONIX) 40 MG tablet Take 1 tablet by mouth daily 90 tablet 3    NIFEdipine (ADALAT CC) 60 MG extended release tablet       lisinopril (PRINIVIL;ZESTRIL) 40 MG tablet Take 1 tablet by mouth daily      hydroCHLOROthiazide (HYDRODIURIL) 25 MG tablet Take 0.5 tablets by mouth daily            /65   Pulse (!) 105   Temp 97.7 °F (36.5 °C) (Oral)   Resp 19   Ht 1.803 m (5' 10.98\")   Wt 69.8 kg (153 lb 14.1 oz)   SpO2 93%   BMI 21.47 kg/m²     Lab Results   Component Value Date/Time    WBC 13.1 (H) 04/01/2025 10:28 AM    LABA1C 4.2 03/13/2025 05:34 AM    POCGLU 166 (H) 04/01/2025 03:36 AM    POCGLU 140 (H) 03/16/2025 05:48 AM    HGB 9.5 (L) 04/01/2025 10:28 AM    HCT 31.0 (L) 04/01/2025 10:28 AM     04/01/2025 10:28 AM       ADULT ORAL NUTRITION SUPPLEMENT; Lunch, Dinner; Standard High Calorie/High Protein Oral Supplement  ADULT DIET; Easy to Chew; Low Phosphorus (Less than 1000 mg); GI [place] (GERD/Peptic Ulcer)  ADULT ORAL NUTRITION SUPPLEMENT; Breakfast, Dinner; Wound Healing Oral Supplement     Osmin BID    ASSESSMENT     Wound Identification & Type: stage 2 PI to coccyx  Dressing change: applied zinc paste and sacral foam  Verbal consent for picture: Yes    Contributing Factors: decreased mobility, shear force, incontinence of stool, and incontinence of urine    Wound 03/28/25 Coccyx Mid;Posterior tear (Active)   Wound Image   04/01/25 1248   Wound Etiology Pressure Stage 2 04/01/25 1248   Dressing Status New dressing applied

## 2025-04-01 NOTE — CARE COORDINATION
Cm reviewed chart. Plans for procedure in angio today.    Current discharge plan is home with home health. Home health through [facility]. Updated clinicals have been sent.

## 2025-04-02 LAB
ANION GAP SERPL CALC-SCNC: 7 MMOL/L (ref 2–12)
BASOPHILS # BLD: 0.03 K/UL (ref 0–0.1)
BASOPHILS NFR BLD: 0.2 % (ref 0–1)
BUN SERPL-MCNC: 62 MG/DL (ref 6–20)
BUN/CREAT SERPL: 28 (ref 12–20)
CA-I BLD-MCNC: 9.4 MG/DL (ref 8.5–10.1)
CHLORIDE SERPL-SCNC: 105 MMOL/L (ref 97–108)
CO2 SERPL-SCNC: 26 MMOL/L (ref 21–32)
CREAT SERPL-MCNC: 2.24 MG/DL (ref 0.7–1.3)
CRP SERPL-MCNC: 3.13 MG/DL (ref 0–0.3)
CYTOLOGY-NON GYN: NORMAL
DIFFERENTIAL METHOD BLD: ABNORMAL
EOSINOPHIL # BLD: 0.21 K/UL (ref 0–0.4)
EOSINOPHIL NFR BLD: 1.7 % (ref 0–7)
ERYTHROCYTE [DISTWIDTH] IN BLOOD BY AUTOMATED COUNT: 16.3 % (ref 11.5–14.5)
GLUCOSE SERPL-MCNC: 122 MG/DL (ref 65–100)
HCT VFR BLD AUTO: 31.7 % (ref 36.6–50.3)
HGB BLD-MCNC: 9.8 G/DL (ref 12.1–17)
IMM GRANULOCYTES # BLD AUTO: 0.07 K/UL (ref 0–0.04)
IMM GRANULOCYTES NFR BLD AUTO: 0.6 % (ref 0–0.5)
LYMPHOCYTES # BLD: 0.78 K/UL (ref 0.8–3.5)
LYMPHOCYTES NFR BLD: 6.3 % (ref 12–49)
MCH RBC QN AUTO: 31.2 PG (ref 26–34)
MCHC RBC AUTO-ENTMCNC: 30.9 G/DL (ref 30–36.5)
MCV RBC AUTO: 101 FL (ref 80–99)
MONOCYTES # BLD: 1.38 K/UL (ref 0–1)
MONOCYTES NFR BLD: 11.1 % (ref 5–13)
NEUTS SEG # BLD: 9.93 K/UL (ref 1.8–8)
NEUTS SEG NFR BLD: 80.1 % (ref 32–75)
NRBC # BLD: 0.03 K/UL (ref 0–0.01)
NRBC BLD-RTO: 0.2 PER 100 WBC
PLATELET # BLD AUTO: 236 K/UL (ref 150–400)
PMV BLD AUTO: 12.2 FL (ref 8.9–12.9)
POTASSIUM SERPL-SCNC: 3.8 MMOL/L (ref 3.5–5.1)
PROCALCITONIN SERPL-MCNC: 0.46 NG/ML
RBC # BLD AUTO: 3.14 M/UL (ref 4.1–5.7)
SODIUM SERPL-SCNC: 138 MMOL/L (ref 136–145)
WBC # BLD AUTO: 12.4 K/UL (ref 4.1–11.1)

## 2025-04-02 PROCEDURE — 94761 N-INVAS EAR/PLS OXIMETRY MLT: CPT

## 2025-04-02 PROCEDURE — 6360000002 HC RX W HCPCS: Performed by: STUDENT IN AN ORGANIZED HEALTH CARE EDUCATION/TRAINING PROGRAM

## 2025-04-02 PROCEDURE — 99232 SBSQ HOSP IP/OBS MODERATE 35: CPT | Performed by: INTERNAL MEDICINE

## 2025-04-02 PROCEDURE — 36415 COLL VENOUS BLD VENIPUNCTURE: CPT

## 2025-04-02 PROCEDURE — 6370000000 HC RX 637 (ALT 250 FOR IP)

## 2025-04-02 PROCEDURE — 87205 SMEAR GRAM STAIN: CPT

## 2025-04-02 PROCEDURE — 6370000000 HC RX 637 (ALT 250 FOR IP): Performed by: INTERNAL MEDICINE

## 2025-04-02 PROCEDURE — 6360000002 HC RX W HCPCS: Performed by: INTERNAL MEDICINE

## 2025-04-02 PROCEDURE — 86140 C-REACTIVE PROTEIN: CPT

## 2025-04-02 PROCEDURE — 2500000003 HC RX 250 WO HCPCS: Performed by: STUDENT IN AN ORGANIZED HEALTH CARE EDUCATION/TRAINING PROGRAM

## 2025-04-02 PROCEDURE — 94640 AIRWAY INHALATION TREATMENT: CPT

## 2025-04-02 PROCEDURE — 97530 THERAPEUTIC ACTIVITIES: CPT

## 2025-04-02 PROCEDURE — 84145 PROCALCITONIN (PCT): CPT

## 2025-04-02 PROCEDURE — 2700000000 HC OXYGEN THERAPY PER DAY

## 2025-04-02 PROCEDURE — 80048 BASIC METABOLIC PNL TOTAL CA: CPT

## 2025-04-02 PROCEDURE — 2580000003 HC RX 258

## 2025-04-02 PROCEDURE — 6360000002 HC RX W HCPCS

## 2025-04-02 PROCEDURE — 1100000000 HC RM PRIVATE

## 2025-04-02 PROCEDURE — 85025 COMPLETE CBC W/AUTO DIFF WBC: CPT

## 2025-04-02 PROCEDURE — 87070 CULTURE OTHR SPECIMN AEROBIC: CPT

## 2025-04-02 RX ORDER — FUROSEMIDE 10 MG/ML
40 INJECTION INTRAMUSCULAR; INTRAVENOUS 2 TIMES DAILY
Status: DISCONTINUED | OUTPATIENT
Start: 2025-04-02 | End: 2025-04-03 | Stop reason: HOSPADM

## 2025-04-02 RX ORDER — FUROSEMIDE 10 MG/ML
40 INJECTION INTRAMUSCULAR; INTRAVENOUS DAILY
Status: DISCONTINUED | OUTPATIENT
Start: 2025-04-03 | End: 2025-04-02

## 2025-04-02 RX ADMIN — BUDESONIDE INHALATION 500 MCG: 0.5 SUSPENSION RESPIRATORY (INHALATION) at 21:49

## 2025-04-02 RX ADMIN — SODIUM CHLORIDE, PRESERVATIVE FREE 10 ML: 5 INJECTION INTRAVENOUS at 21:44

## 2025-04-02 RX ADMIN — FUROSEMIDE 40 MG: 10 INJECTION, SOLUTION INTRAMUSCULAR; INTRAVENOUS at 18:52

## 2025-04-02 RX ADMIN — IPRATROPIUM BROMIDE AND ALBUTEROL SULFATE 1 DOSE: 2.5; .5 SOLUTION RESPIRATORY (INHALATION) at 21:49

## 2025-04-02 RX ADMIN — ASPIRIN 81 MG: 81 TABLET, COATED ORAL at 11:29

## 2025-04-02 RX ADMIN — PANTOPRAZOLE SODIUM 40 MG: 40 TABLET, DELAYED RELEASE ORAL at 05:22

## 2025-04-02 RX ADMIN — ATORVASTATIN CALCIUM 80 MG: 40 TABLET, FILM COATED ORAL at 21:39

## 2025-04-02 RX ADMIN — METOPROLOL TARTRATE 25 MG: 25 TABLET, FILM COATED ORAL at 11:29

## 2025-04-02 RX ADMIN — MEROPENEM 1000 MG: 1 INJECTION INTRAVENOUS at 21:42

## 2025-04-02 RX ADMIN — MEROPENEM 1000 MG: 1 INJECTION INTRAVENOUS at 11:27

## 2025-04-02 RX ADMIN — HEPARIN SODIUM 5000 UNITS: 5000 INJECTION INTRAVENOUS; SUBCUTANEOUS at 14:00

## 2025-04-02 RX ADMIN — BUDESONIDE INHALATION 500 MCG: 0.5 SUSPENSION RESPIRATORY (INHALATION) at 07:21

## 2025-04-02 RX ADMIN — HEPARIN SODIUM 5000 UNITS: 5000 INJECTION INTRAVENOUS; SUBCUTANEOUS at 05:22

## 2025-04-02 RX ADMIN — CALCITRIOL CAPSULES 0.25 MCG 0.25 MCG: 0.25 CAPSULE ORAL at 11:29

## 2025-04-02 RX ADMIN — IPRATROPIUM BROMIDE AND ALBUTEROL SULFATE 1 DOSE: 2.5; .5 SOLUTION RESPIRATORY (INHALATION) at 07:22

## 2025-04-02 RX ADMIN — SODIUM CHLORIDE, PRESERVATIVE FREE 10 ML: 5 INJECTION INTRAVENOUS at 11:21

## 2025-04-02 RX ADMIN — HEPARIN SODIUM 5000 UNITS: 5000 INJECTION INTRAVENOUS; SUBCUTANEOUS at 21:39

## 2025-04-02 RX ADMIN — FUROSEMIDE 40 MG: 10 INJECTION, SOLUTION INTRAMUSCULAR; INTRAVENOUS at 11:24

## 2025-04-02 RX ADMIN — POTASSIUM BICARBONATE 20 MEQ: 782 TABLET, EFFERVESCENT ORAL at 18:52

## 2025-04-02 RX ADMIN — METOPROLOL TARTRATE 25 MG: 25 TABLET, FILM COATED ORAL at 21:39

## 2025-04-02 ASSESSMENT — ENCOUNTER SYMPTOMS
NAUSEA: 0
VOMITING: 0
SHORTNESS OF BREATH: 1
DIARRHEA: 0
COUGH: 1
ABDOMINAL PAIN: 0
GASTROINTESTINAL NEGATIVE: 1

## 2025-04-02 NOTE — CARE COORDINATION
CM reviewed chart. Patient being followed by ID for antibiotics.     Open with [facility]. Updated clinicals have been sent.    Current discharge plan is home with home health once clinically stable.

## 2025-04-03 VITALS
HEART RATE: 100 BPM | DIASTOLIC BLOOD PRESSURE: 65 MMHG | OXYGEN SATURATION: 90 % | WEIGHT: 153.88 LBS | BODY MASS INDEX: 21.54 KG/M2 | TEMPERATURE: 97.9 F | HEIGHT: 71 IN | SYSTOLIC BLOOD PRESSURE: 133 MMHG | RESPIRATION RATE: 18 BRPM

## 2025-04-03 PROBLEM — I50.23 ACUTE ON CHRONIC SYSTOLIC CHF (CONGESTIVE HEART FAILURE) (HCC): Status: RESOLVED | Noted: 2025-04-03 | Resolved: 2025-04-03

## 2025-04-03 PROBLEM — J96.01 ACUTE HYPOXIC RESPIRATORY FAILURE: Status: ACTIVE | Noted: 2025-04-03

## 2025-04-03 PROBLEM — R06.82 TACHYPNEA: Status: ACTIVE | Noted: 2025-04-03

## 2025-04-03 PROBLEM — I48.91 ATRIAL FIBRILLATION (HCC): Status: ACTIVE | Noted: 2025-04-03

## 2025-04-03 PROBLEM — J96.01 ACUTE HYPOXIC RESPIRATORY FAILURE: Status: RESOLVED | Noted: 2025-04-03 | Resolved: 2025-04-03

## 2025-04-03 PROBLEM — I21.4 NSTEMI (NON-ST ELEVATED MYOCARDIAL INFARCTION) (HCC): Status: RESOLVED | Noted: 2025-03-13 | Resolved: 2025-04-03

## 2025-04-03 PROBLEM — R00.0 TACHYCARDIA: Status: RESOLVED | Noted: 2025-04-03 | Resolved: 2025-04-03

## 2025-04-03 PROBLEM — R06.82 TACHYPNEA: Status: RESOLVED | Noted: 2025-04-03 | Resolved: 2025-04-03

## 2025-04-03 PROBLEM — J81.1 PULMONARY EDEMA: Status: ACTIVE | Noted: 2025-04-03

## 2025-04-03 PROBLEM — R79.89 ELEVATED D-DIMER: Status: RESOLVED | Noted: 2025-04-03 | Resolved: 2025-04-03

## 2025-04-03 PROBLEM — J18.9 HCAP (HEALTHCARE-ASSOCIATED PNEUMONIA): Status: ACTIVE | Noted: 2025-04-03

## 2025-04-03 PROBLEM — E83.42 HYPOMAGNESEMIA: Status: ACTIVE | Noted: 2025-04-03

## 2025-04-03 PROBLEM — R79.89 ELEVATED D-DIMER: Status: ACTIVE | Noted: 2025-04-03

## 2025-04-03 PROBLEM — I50.23 ACUTE ON CHRONIC SYSTOLIC CHF (CONGESTIVE HEART FAILURE) (HCC): Status: ACTIVE | Noted: 2025-04-03

## 2025-04-03 PROBLEM — J81.1 PULMONARY EDEMA: Status: RESOLVED | Noted: 2025-04-03 | Resolved: 2025-04-03

## 2025-04-03 PROBLEM — N17.9 ACUTE KIDNEY INJURY SUPERIMPOSED ON CKD: Status: RESOLVED | Noted: 2025-03-13 | Resolved: 2025-04-03

## 2025-04-03 PROBLEM — R00.0 TACHYCARDIA: Status: ACTIVE | Noted: 2025-04-03

## 2025-04-03 PROBLEM — E83.42 HYPOMAGNESEMIA: Status: RESOLVED | Noted: 2025-04-03 | Resolved: 2025-04-03

## 2025-04-03 PROBLEM — D63.8 ANEMIA OF CHRONIC DISEASE: Status: ACTIVE | Noted: 2025-04-03

## 2025-04-03 PROBLEM — N18.9 ACUTE KIDNEY INJURY SUPERIMPOSED ON CKD: Status: ACTIVE | Noted: 2025-03-13

## 2025-04-03 PROBLEM — E87.6 HYPOKALEMIA: Status: ACTIVE | Noted: 2025-04-03

## 2025-04-03 PROBLEM — E87.6 HYPOKALEMIA: Status: RESOLVED | Noted: 2025-04-03 | Resolved: 2025-04-03

## 2025-04-03 PROBLEM — K92.2 GIB (GASTROINTESTINAL BLEEDING): Status: RESOLVED | Noted: 2025-03-13 | Resolved: 2025-04-03

## 2025-04-03 PROBLEM — I25.5 ISCHEMIC CARDIOMYOPATHY: Status: ACTIVE | Noted: 2025-04-03

## 2025-04-03 PROBLEM — N18.9 ACUTE KIDNEY INJURY SUPERIMPOSED ON CKD: Status: RESOLVED | Noted: 2025-03-13 | Resolved: 2025-04-03

## 2025-04-03 LAB
ALBUMIN SERPL-MCNC: 2.5 G/DL (ref 3.5–5)
ALBUMIN/GLOB SERPL: 0.6 (ref 1.1–2.2)
ALP SERPL-CCNC: 69 U/L (ref 45–117)
ALT SERPL-CCNC: 33 U/L (ref 12–78)
ANION GAP SERPL CALC-SCNC: 6 MMOL/L (ref 2–12)
AST SERPL W P-5'-P-CCNC: 29 U/L (ref 15–37)
BACTERIA SPEC CULT: NORMAL
BACTERIA SPEC CULT: NORMAL
BASOPHILS # BLD: 0.04 K/UL (ref 0–0.1)
BASOPHILS NFR BLD: 0.3 % (ref 0–1)
BILIRUB SERPL-MCNC: 1.1 MG/DL (ref 0.2–1)
BNP SERPL-MCNC: ABNORMAL PG/ML
BUN SERPL-MCNC: 62 MG/DL (ref 6–20)
BUN/CREAT SERPL: 30 (ref 12–20)
CA-I BLD-MCNC: 9.2 MG/DL (ref 8.5–10.1)
CHLORIDE SERPL-SCNC: 105 MMOL/L (ref 97–108)
CO2 SERPL-SCNC: 28 MMOL/L (ref 21–32)
CREAT SERPL-MCNC: 2.06 MG/DL (ref 0.7–1.3)
CRP SERPL-MCNC: 2.65 MG/DL (ref 0–0.3)
DIFFERENTIAL METHOD BLD: ABNORMAL
EOSINOPHIL # BLD: 0.22 K/UL (ref 0–0.4)
EOSINOPHIL NFR BLD: 1.7 % (ref 0–7)
ERYTHROCYTE [DISTWIDTH] IN BLOOD BY AUTOMATED COUNT: 16.4 % (ref 11.5–14.5)
GLOBULIN SER CALC-MCNC: 4 G/DL (ref 2–4)
GLUCOSE SERPL-MCNC: 123 MG/DL (ref 65–100)
HCT VFR BLD AUTO: 31 % (ref 36.6–50.3)
HGB BLD-MCNC: 9.5 G/DL (ref 12.1–17)
IMM GRANULOCYTES # BLD AUTO: 0.07 K/UL (ref 0–0.04)
IMM GRANULOCYTES NFR BLD AUTO: 0.5 % (ref 0–0.5)
LYMPHOCYTES # BLD: 0.82 K/UL (ref 0.8–3.5)
LYMPHOCYTES NFR BLD: 6.4 % (ref 12–49)
Lab: NORMAL
Lab: NORMAL
MAGNESIUM SERPL-MCNC: 2.2 MG/DL (ref 1.6–2.4)
MCH RBC QN AUTO: 31.1 PG (ref 26–34)
MCHC RBC AUTO-ENTMCNC: 30.6 G/DL (ref 30–36.5)
MCV RBC AUTO: 101.6 FL (ref 80–99)
MONOCYTES # BLD: 1.46 K/UL (ref 0–1)
MONOCYTES NFR BLD: 11.5 % (ref 5–13)
NEUTS SEG # BLD: 10.12 K/UL (ref 1.8–8)
NEUTS SEG NFR BLD: 79.6 % (ref 32–75)
NRBC # BLD: 0 K/UL (ref 0–0.01)
NRBC BLD-RTO: 0 PER 100 WBC
PHOSPHATE SERPL-MCNC: 3.4 MG/DL (ref 2.6–4.7)
PLATELET # BLD AUTO: 202 K/UL (ref 150–400)
PMV BLD AUTO: 12.4 FL (ref 8.9–12.9)
POTASSIUM SERPL-SCNC: 3.8 MMOL/L (ref 3.5–5.1)
PROCALCITONIN SERPL-MCNC: 0.44 NG/ML
PROT SERPL-MCNC: 6.5 G/DL (ref 6.4–8.2)
RBC # BLD AUTO: 3.05 M/UL (ref 4.1–5.7)
SODIUM SERPL-SCNC: 139 MMOL/L (ref 136–145)
WBC # BLD AUTO: 12.7 K/UL (ref 4.1–11.1)

## 2025-04-03 PROCEDURE — 84145 PROCALCITONIN (PCT): CPT

## 2025-04-03 PROCEDURE — 6370000000 HC RX 637 (ALT 250 FOR IP): Performed by: INTERNAL MEDICINE

## 2025-04-03 PROCEDURE — 94761 N-INVAS EAR/PLS OXIMETRY MLT: CPT

## 2025-04-03 PROCEDURE — 6370000000 HC RX 637 (ALT 250 FOR IP)

## 2025-04-03 PROCEDURE — 6360000002 HC RX W HCPCS: Performed by: INTERNAL MEDICINE

## 2025-04-03 PROCEDURE — 94640 AIRWAY INHALATION TREATMENT: CPT

## 2025-04-03 PROCEDURE — 6360000002 HC RX W HCPCS: Performed by: STUDENT IN AN ORGANIZED HEALTH CARE EDUCATION/TRAINING PROGRAM

## 2025-04-03 PROCEDURE — 83735 ASSAY OF MAGNESIUM: CPT

## 2025-04-03 PROCEDURE — 36415 COLL VENOUS BLD VENIPUNCTURE: CPT

## 2025-04-03 PROCEDURE — 86140 C-REACTIVE PROTEIN: CPT

## 2025-04-03 PROCEDURE — 80053 COMPREHEN METABOLIC PANEL: CPT

## 2025-04-03 PROCEDURE — 84100 ASSAY OF PHOSPHORUS: CPT

## 2025-04-03 PROCEDURE — 2500000003 HC RX 250 WO HCPCS: Performed by: STUDENT IN AN ORGANIZED HEALTH CARE EDUCATION/TRAINING PROGRAM

## 2025-04-03 PROCEDURE — 83880 ASSAY OF NATRIURETIC PEPTIDE: CPT

## 2025-04-03 PROCEDURE — 99232 SBSQ HOSP IP/OBS MODERATE 35: CPT | Performed by: INTERNAL MEDICINE

## 2025-04-03 PROCEDURE — 2700000000 HC OXYGEN THERAPY PER DAY

## 2025-04-03 PROCEDURE — 85025 COMPLETE CBC W/AUTO DIFF WBC: CPT

## 2025-04-03 RX ORDER — BUDESONIDE 0.5 MG/2ML
0.5 INHALANT ORAL
Qty: 120 ML | Refills: 0 | Status: SHIPPED | OUTPATIENT
Start: 2025-04-03 | End: 2025-05-03

## 2025-04-03 RX ORDER — LEVOFLOXACIN 750 MG/1
750 TABLET, FILM COATED ORAL DAILY
Qty: 7 TABLET | Refills: 0 | Status: SHIPPED | OUTPATIENT
Start: 2025-04-03 | End: 2025-04-10

## 2025-04-03 RX ORDER — CALCITRIOL 0.25 UG/1
0.25 CAPSULE, LIQUID FILLED ORAL DAILY
Qty: 30 CAPSULE | Refills: 0 | Status: SHIPPED | OUTPATIENT
Start: 2025-04-03 | End: 2025-05-03

## 2025-04-03 RX ORDER — IPRATROPIUM BROMIDE AND ALBUTEROL SULFATE 2.5; .5 MG/3ML; MG/3ML
3 SOLUTION RESPIRATORY (INHALATION)
Qty: 360 ML | Refills: 0 | Status: SHIPPED | OUTPATIENT
Start: 2025-04-03

## 2025-04-03 RX ORDER — ATORVASTATIN CALCIUM 80 MG/1
80 TABLET, FILM COATED ORAL NIGHTLY
Qty: 30 TABLET | Refills: 0 | Status: SHIPPED | OUTPATIENT
Start: 2025-04-03 | End: 2025-05-03

## 2025-04-03 RX ORDER — METOPROLOL TARTRATE 25 MG/1
25 TABLET, FILM COATED ORAL 2 TIMES DAILY
Qty: 60 TABLET | Refills: 0 | Status: SHIPPED | OUTPATIENT
Start: 2025-04-03 | End: 2025-05-03

## 2025-04-03 RX ORDER — FUROSEMIDE 40 MG/1
40 TABLET ORAL DAILY
Qty: 60 TABLET | Refills: 3 | Status: SHIPPED | OUTPATIENT
Start: 2025-04-03

## 2025-04-03 RX ORDER — ERGOCALCIFEROL 1.25 MG/1
50000 CAPSULE ORAL WEEKLY
Qty: 5 CAPSULE | Refills: 0 | Status: SHIPPED | OUTPATIENT
Start: 2025-04-05 | End: 2025-07-06

## 2025-04-03 RX ORDER — ASPIRIN 81 MG/1
81 TABLET ORAL DAILY
Qty: 30 TABLET | Refills: 0 | Status: SHIPPED | OUTPATIENT
Start: 2025-04-03 | End: 2025-05-03

## 2025-04-03 RX ADMIN — SODIUM CHLORIDE, PRESERVATIVE FREE 10 ML: 5 INJECTION INTRAVENOUS at 11:00

## 2025-04-03 RX ADMIN — PANTOPRAZOLE SODIUM 40 MG: 40 TABLET, DELAYED RELEASE ORAL at 06:07

## 2025-04-03 RX ADMIN — METOPROLOL TARTRATE 25 MG: 25 TABLET, FILM COATED ORAL at 10:59

## 2025-04-03 RX ADMIN — ASPIRIN 81 MG: 81 TABLET, COATED ORAL at 10:59

## 2025-04-03 RX ADMIN — HEPARIN SODIUM 5000 UNITS: 5000 INJECTION INTRAVENOUS; SUBCUTANEOUS at 06:07

## 2025-04-03 RX ADMIN — CALCITRIOL CAPSULES 0.25 MCG 0.25 MCG: 0.25 CAPSULE ORAL at 10:58

## 2025-04-03 RX ADMIN — BUDESONIDE INHALATION 500 MCG: 0.5 SUSPENSION RESPIRATORY (INHALATION) at 08:09

## 2025-04-03 RX ADMIN — IPRATROPIUM BROMIDE AND ALBUTEROL SULFATE 1 DOSE: 2.5; .5 SOLUTION RESPIRATORY (INHALATION) at 08:09

## 2025-04-03 RX ADMIN — FUROSEMIDE 40 MG: 10 INJECTION, SOLUTION INTRAMUSCULAR; INTRAVENOUS at 11:00

## 2025-04-03 NOTE — DISCHARGE SUMMARY
Discharge Summary    Name: Lei Jaimes  [id number]  YOB: 1945 (Age: 79 y.o.)   Date of Admission: 3/13/2025  Date of Discharge: 4/3/2025  Attending Physician: Pk Wilkerson MD    Discharge Diagnosis:   Principal Problem (Resolved):    GIB (gastrointestinal bleeding)  Active Problems:    HCAP (healthcare-associated pneumonia)    Anemia of chronic disease    Ischemic cardiomyopathy    Atrial fibrillation (HCC)  Resolved Problems:    NSTEMI (non-ST elevated myocardial infarction) (HCC)    Acute kidney injury superimposed on CKD    Acute on chronic systolic CHF (congestive heart failure) (HCC)    Pulmonary edema    Acute hypoxic respiratory failure    Tachycardia    Elevated d-dimer    Tachypnea    Hypokalemia    Hypomagnesemia       Consultations:  IP CONSULT TO CARDIOLOGY  IP CONSULT TO GI  IP CONSULT TO NEPHROLOGY  IP CONSULT TO PULMONOLOGY  IP CONSULT TO INFECTIOUS DISEASES    Brief Hospital Course by Main Problems:   Lei Jaimes is a 79 y.o.  male with PMHx significant for BPH, dysphagia, GERD, hypertension, left adrenal mass, esophageal stricture, who presented to the ED after episode of choking on coffee that resulted in vomiting and right-sided rib pain and dyspnea.  In ED, patient with bright red blood in vomit.  Started on octreotide and IV Protonix.  Additionally, echo at outside facility showed new regional wall motion abnormalities with rising troponin and EKG showed T wave abnormality with anterolateral ischemia but unchanged from before.  Transferred to [facility] ICU for further evaluation by GI for upper GI bleed and cardiology for NSTEMI.  Per cardiology, type II NSTEMI with mild troponin leak and atypical chest pain that is most likely related to patient's dysphagia with history of esophageal strictures.  Repeat echo here showed EF 50 to 55% with no regional wall motion abnormalities.  Troponin peaked at 32,000, then trended down.  No further

## 2025-04-03 NOTE — PROGRESS NOTES
[facility] at [address] [address]  Phone: [phone]      Progress Note      3/27/2025 12:35 PM  NAME: Lei Jaimes   MRN:  [id number]   Admit Diagnosis: Upper GI bleed [K92.2]  GIB (gastrointestinal bleeding) [K92.2]          Assessment/Plan:     Paroxysmal atrial fibrillation with RVR.  Converted to sinus tachycardia.  Continue beta-blocker for rate control.  Anticoagulation on hold due to recent GI bleed.  Status post non-STEMI.  Patient currently not a suitable candidate for invasive cardiac intervention due to acute GI bleed.  Continue current antianginal regimen consisting of low-dose aspirin, beta-blocker, subcu heparin and high-dose statin.  Multifocal pneumonia.  Patient on antibiotics and followed by pulmonary.  SARA on CKD  Ischemic cardiomyopathy.  LV ejection fraction 25-30%.  Patient well compensated from the standpoint of CHF.  Continue hydralazine for afterload reduction.         []       High complexity decision making was performed in this patient at high risk for decompensation with multiple organ involvement.    Subjective:     Lei Jaimes denies chest pain, dyspnea.  Discussed with RN events overnight.     Review of Systems:     []         Unable to obtain  ROS due to ---   [x]         Total of 12 systems reviewed as follows:     Constitutional: negative fever, negative chills, negative weight loss  Eyes:               negative visual changes  ENT:                negative sore throat, tongue or lip swelling  Respiratory:     negative cough, negative dyspnea  Cards:             As per HPI  GI:                   negative for nausea, vomiting, diarrhea, and abdominal pain  Genitourinary: negative for frequency, dysuria  Integument:     negative for rash   Hematologic:   negative for easy bruising and gum/nose bleeding  Musculoskel:   negative for myalgias,  back pain  Neurological:   negative for headaches, dizziness, vertigo, 
        [facility] at [address] [address]  Phone: [phone]      Progress Note      3/17/2025 12:35 PM  NAME: Lei Jaimes   MRN:  [id number]   Admit Diagnosis: Upper GI bleed [K92.2]  GIB (gastrointestinal bleeding) [K92.2]          Assessment/Plan:     Status post non-STEMI with peak troponin of 859[address].  LV ejection fraction dropped to 25-30%.  Patient at present not suitable for any invasive cardiac intervention due to active GI bleed with low hemoglobin count.  Will recommend conservative medical management using nitrates and beta-blocker along with a statin as tolerated by hemodynamics.  Hypoxic respiratory failure requiring high flow oxygen which at present appears to be mixed picture of aspiration pneumonia and fluid overload.  Continue antibiotics with diuretics for fluid withdrawal.  GI bleed with mid esophageal ulcer.  History of esophageal strictures with multiple dilatations in the past.  Patient's status post EGD with epi injections.  CKD  Case discussed with interventional cardiologist Dr. Hooks.  Care plan also discussed in detail with the son and the wife on bedside.         []       High complexity decision making was performed in this patient at high risk for decompensation with multiple organ involvement.    Subjective:     Lei Jaimes denies chest pain, dyspnea.  Discussed with RN events overnight.     Review of Systems:     []         Unable to obtain  ROS due to ---   [x]         Total of 12 systems reviewed as follows:     Constitutional: negative fever, negative chills, negative weight loss  Eyes:               negative visual changes  ENT:                negative sore throat, tongue or lip swelling  Respiratory:     negative cough, negative dyspnea  Cards:             As per HPI  GI:                   negative for nausea, vomiting, diarrhea, and abdominal pain  Genitourinary: negative for frequency, dysuria  Integument:     negative for rash 
        [facility] at [address] [address]  Phone: [phone]      Progress Note      3/21/2025 11:44 AM  NAME: Lei Jaimes   MRN:  [id number]   Admit Diagnosis: Upper GI bleed [K92.2]  GIB (gastrointestinal bleeding) [K92.2]          Assessment/Plan:     Status post non-STEMI with peak troponin of 48[address].  LV ejection fraction dropped to 25-30%.  Patient at present not suitable for any invasive cardiac intervention due to acute GI bleed with low hemoglobin count.  Continue current GDMT using beta-blocker along with a statin as tolerated by hemodynamics.  Since hemoglobin has been stable, I will add low-dose enteric-coated aspirin to further optimize empiric antianginal coverage.  Hypoxic respiratory failure requiring supplemental oxygen which  appears to be mixed picture of aspiration pneumonia and fluid overload.  LV ejection fraction 25-30% by repeat echo.  Continue current GDMT.  GI bleed with mid esophageal ulcer.  History of esophageal strictures with multiple dilatations in the past.  Patient's status post EGD with epi injections.  Status post PRBC transfusion.  Hemoglobin today 8.7  CKD           []       High complexity decision making was performed in this patient at high risk for decompensation with multiple organ involvement.    Subjective:     Lei Jaimes denies chest pain, dyspnea.  Discussed with RN events overnight.     Review of Systems:     []         Unable to obtain  ROS due to ---   [x]         Total of 12 systems reviewed as follows:     Constitutional: negative fever, negative chills, negative weight loss  Eyes:               negative visual changes  ENT:                negative sore throat, tongue or lip swelling  Respiratory:     negative cough, negative dyspnea  Cards:             As per HPI  GI:                   negative for nausea, vomiting, diarrhea, and abdominal pain  Genitourinary: negative for frequency, dysuria  Integument:     negative 
        [facility] at [address] [address]  Phone: [phone]      Progress Note      3/19/2025 12:13 PM  NAME: Lei Jaimes   MRN:  [id number]   Admit Diagnosis: Upper GI bleed [K92.2]  GIB (gastrointestinal bleeding) [K92.2]          Assessment/Plan:     Status post non-STEMI with peak troponin of 915[address].  LV ejection fraction dropped to 25-30%.  Patient at present not suitable for any invasive cardiac intervention due to acute GI bleed with low hemoglobin count.  Will recommend conservative medical management using nitrates and beta-blocker along with a statin as tolerated by hemodynamics.  Hypoxic respiratory failure requiring high flow oxygen starting which at present appears to be mixed picture of aspiration pneumonia and fluid overload.  Patient overnight patient became more hypoxic with oxygen requirement increasing from 2 L to BiPAP at 45% FiO2.  Patient now after diuresis is back to nasal cannula oxygen.    GI bleed with mid esophageal ulcer.  History of esophageal strictures with multiple dilatations in the past.  Patient's status post EGD with epi injections.  Status post PRBC transfusion.  Hemoglobin today 8.6  CKD  Care discussed with the wife on bedside.         []       High complexity decision making was performed in this patient at high risk for decompensation with multiple organ involvement.    Subjective:     Lei Jaimes denies chest pain, dyspnea.  Discussed with RN events overnight.     Review of Systems:     []         Unable to obtain  ROS due to ---   [x]         Total of 12 systems reviewed as follows:     Constitutional: negative fever, negative chills, negative weight loss  Eyes:               negative visual changes  ENT:                negative sore throat, tongue or lip swelling  Respiratory:     negative cough, negative dyspnea  Cards:             As per HPI  GI:                   negative for nausea, vomiting, diarrhea, and abdominal 
        Progress Note      3/18/2025 11:14 AM  NAME: Lei Jaimes   MRN:  [id number]   Admit Diagnosis: Upper GI bleed [K92.2]  GIB (gastrointestinal bleeding) [K92.2]      Problem List:   Acute NSTEMI->peak trop 32, 662  Acute UGI bleed  Severe LV dysfunction with EF drop from 55% on 3/13/25 to 25-30% on 3/16/25  Esophageal Ulcer  SARA with CKD  PAD  HTN     Assessment/Plan:   Too high risk for invasive cardiac therapy or DAPT despite acute MI  GDMT for low EF  Transfuse to keep Hg >8  Long discussion with wife and daughter about cardiac diagnoses and plan of care  BB and high dose statin to continue         []       High complexity decision making was performed in this patient at high risk for decompensation with multiple organ involvement.    Subjective:     Lei Jaimes denies chest pain, dyspnea. Remains on high flow oxygen.  Discussed with RN events overnight.     Review of Systems:   Negative except for as noted above.    Objective:      Physical Exam:    Last 24hrs VS reviewed since prior progress note. Most recent are:    BP (!) 133/53   Pulse (!) 105   Temp 99.4 °F (37.4 °C) (Oral)   Resp 26   Ht 1.803 m (5' 11\") Comment: Pt stated  Wt 76.7 kg (169 lb 1.5 oz)   SpO2 93%   BMI 23.58 kg/m²     Intake/Output Summary (Last 24 hours) at 3/18/2025 1114  Last data filed at 3/18/2025 0906  Gross per 24 hour   Intake 980 ml   Output 1250 ml   Net -270 ml        General Appearance: Alert; no acute distress.  Ears/Nose/Mouth/Throat: moist mucous membranes  Neck: Supple.  Chest: Lungs clear to auscultation bilaterally.  Cardiovascular: Regular rate and rhythm, S1S2 normal  Abdomen: Soft, non-tender, bowel sounds are active.  Extremities: No edema bilaterally.  Skin: Warm and dry.      PMH/SH reviewed - no change compared to H&P    Telemetry: NSR    EKG: NSR; no ST elevation    No valid procedures specified.    No results found for this or any previous visit.    []  No new EKG for review    Lab Data Personally 
        [facility] at [address] [address]  Phone: [phone]      Progress Note      3/26/2025 11:01 AM  NAME: Lei Jaimes   MRN:  [id number]   Admit Diagnosis: Upper GI bleed [K92.2]  GIB (gastrointestinal bleeding) [K92.2]          Assessment/Plan:     Paroxysmal atrial fibrillation with RVR.  Patient currently has sinus tachycardia.  Anticoagulation on hold due to recent GI bleed.  Status post non-STEMI.  Patient currently not a suitable candidate for invasive cardiac intervention due to acute GI bleed.  Continue current antianginal regimen consisting of low-dose aspirin, beta-blocker, subcu heparin and high-dose statin.  Multifocal pneumonia.  Patient on antibiotics.  SARA on CKD  Ischemic cardiomyopathy.  LV ejection fraction 25-30%.  Patient well compensated from the standpoint of CHF.  Continue hydralazine for afterload reduction.         []       High complexity decision making was performed in this patient at high risk for decompensation with multiple organ involvement.    Subjective:     Lei Jaimes denies chest pain, dyspnea.  Discussed with RN events overnight.     Review of Systems:     []         Unable to obtain  ROS due to ---   [x]         Total of 12 systems reviewed as follows:     Constitutional: negative fever, negative chills, negative weight loss  Eyes:               negative visual changes  ENT:                negative sore throat, tongue or lip swelling  Respiratory:     negative cough, negative dyspnea  Cards:             As per HPI  GI:                   negative for nausea, vomiting, diarrhea, and abdominal pain  Genitourinary: negative for frequency, dysuria  Integument:     negative for rash   Hematologic:   negative for easy bruising and gum/nose bleeding  Musculoskel:   negative for myalgias,  back pain  Neurological:   negative for headaches, dizziness, vertigo, weakness  Behavl/Psych: negative for feelings of anxiety, depression 
      Hospitalist Progress Note    NAME:   Lei Jaimes   : 1945   MRN: [id number]     Date/Time: 3/31/2025 2:44 PM  Patient PCP: Akash Contreras MD    Estimated discharge date: 24 to 48 hours  Barriers: Wean O2, improvement in kidney function, clinical improvement    Hospital Course:  Lei Jaimes is a 79 y.o.  male with PMHx significant for BPH, dysphagia, GERD, hypertension, left adrenal mass, esophageal stricture, who presented to the ED after episode of choking on coffee and a Albanian that resulted in vomiting and right-sided rib pain and dyspnea.  In ED, patient with bright red blood in vomit.  Started on octreotide and IV Protonix.  Additionally, echo at outside facility showed new regional wall motion abnormalities with rising troponin and EKG showed T wave abnormality with anterolateral ischemia but unchanged from before.  Transferred to [facility] ICU for further evaluation by GI for upper GI bleed and cardiology for NSTEMI.  Per cardiology, type II NSTEMI with mild troponin leak and atypical chest pain that is most likely related to patient's dysphagia with history of esophageal strictures.  Repeat echo here showed EF 50 to 55% with no regional wall motion abnormalities.  Troponin peaked at 32,000, now trending down.  No further intervention per cardiology. In ICU, patient with increased hematemesis.  CTA chest A/P showed no acute bleeding. GI performed EGD which showed diffuse severe mucosal changes with increased vascular pattern of esophageal mucosa, reflux esophagitis, gastritis. No severe strictures or Olivia-Benson tears noted.  Epinephrine was injected 2 vertical lacerations of midesophagus. Did require blood transfusion 2 units PRBC after EGD, hemoglobin stabilized.  Patient then with increased oxygen requirements, concern for aspiration pneumonia.  Requiring BiPAP.  Concern for fluid overload and was started on IV Lasix as well as azithromycin and Zosyn for aspiration pneumonia coverage.  
      Hospitalist Progress Note    NAME:   Lei Jaimes   : 1945   MRN: [id number]     Date/Time: 3/26/2025 4:18 PM  Patient PCP: Akash Contreras MD    Estimated discharge date: 24 to 48 hours  Barriers: Wean O2, improvement in kidney function, clinical improvement    Hospital Course:  Lei Jaimes is a 79 y.o.  male with PMHx significant for BPH, dysphagia, GERD, hypertension, left adrenal mass, esophageal stricture, who presented to the ED after episode of choking on coffee and a Thai that resulted in vomiting and right-sided rib pain and dyspnea.  In ED, patient with bright red blood in vomit.  Started on octreotide and IV Protonix.  Additionally, echo at outside facility showed new regional wall motion abnormalities with rising troponin and EKG showed T wave abnormality with anterolateral ischemia but unchanged from before.  Transferred to [facility] ICU for further evaluation by GI for upper GI bleed and cardiology for NSTEMI.  Per cardiology, type II NSTEMI with mild troponin leak and atypical chest pain that is most likely related to patient's dysphagia with history of esophageal strictures.  Repeat echo here showed EF 50 to 55% with no regional wall motion abnormalities.  Troponin peaked at 32,000, now trending down.  No further intervention per cardiology. In ICU, patient with increased hematemesis.  CTA chest A/P showed no acute bleeding. GI performed EGD which showed diffuse severe mucosal changes with increased vascular pattern of esophageal mucosa, reflux esophagitis, gastritis.No severe strictures or Olivia-Benson tears noted.  Epinephrine was injected 2 vertical lacerations of midesophagus. Did require blood transfusion 2 units PRBC after EGD, hemoglobin stabilized.  Patient then with increased oxygen requirements, concern for aspiration pneumonia.  Requiring BiPAP.  Concern for fluid overload and was started on IV Lasix as well as azithromycin and Zosyn for aspiration pneumonia coverage.  
      Hospitalist Progress Note    NAME:   Lei Jaimes   : 1945   MRN: [id number]     Date/Time: 2025 2:31 PM  Patient PCP: Akash Contreras MD    Estimated discharge date: 48 hours  Barriers: Wean O2, continued diuresis, pleural effusion analysis    Hospital Course:  Lei Jaimes is a 79 y.o.  male with PMHx significant for BPH, dysphagia, GERD, hypertension, left adrenal mass, esophageal stricture, who presented to the ED after episode of choking on coffee that resulted in vomiting and right-sided rib pain and dyspnea.  In ED, patient with bright red blood in vomit.  Started on octreotide and IV Protonix.  Additionally, echo at outside facility showed new regional wall motion abnormalities with rising troponin and EKG showed T wave abnormality with anterolateral ischemia but unchanged from before.  Transferred to [facility] ICU for further evaluation by GI for upper GI bleed and cardiology for NSTEMI.  Per cardiology, type II NSTEMI with mild troponin leak and atypical chest pain that is most likely related to patient's dysphagia with history of esophageal strictures.  Repeat echo here showed EF 50 to 55% with no regional wall motion abnormalities.  Troponin peaked at 32,000, now trending down.  No further intervention per cardiology. In ICU, patient with increased hematemesis.  CTA chest A/P showed no acute bleeding. GI performed EGD which showed diffuse severe mucosal changes with increased vascular pattern of esophageal mucosa, reflux esophagitis, gastritis. No severe strictures or Olivia-Benson tears noted.  Epinephrine was injected 2 vertical lacerations of midesophagus. Did require blood transfusion 2 units PRBC after EGD, hemoglobin stabilized.  Patient then with increased oxygen requirements, concern for aspiration pneumonia.  Requiring BiPAP.  Concern for fluid overload and was started on IV Lasix as well as azithromycin and Zosyn for aspiration pneumonia coverage.  Repeat TTE showed worsening 
      Hospitalist Progress Note    NAME:   Lei Jaimes   : 1945   MRN: [id number]     Date/Time: 2025 1:28 PM  Patient PCP: Akash Contreras MD    Estimated discharge date: 48 hours  Barriers: Wean O2, continued diuresis, pleural effusion analysis    Hospital Course:  Lei Jaimes is a 79 y.o.  male with PMHx significant for BPH, dysphagia, GERD, hypertension, left adrenal mass, esophageal stricture, who presented to the ED after episode of choking on coffee that resulted in vomiting and right-sided rib pain and dyspnea.  In ED, patient with bright red blood in vomit.  Started on octreotide and IV Protonix.  Additionally, echo at outside facility showed new regional wall motion abnormalities with rising troponin and EKG showed T wave abnormality with anterolateral ischemia but unchanged from before.  Transferred to [facility] ICU for further evaluation by GI for upper GI bleed and cardiology for NSTEMI.  Per cardiology, type II NSTEMI with mild troponin leak and atypical chest pain that is most likely related to patient's dysphagia with history of esophageal strictures.  Repeat echo here showed EF 50 to 55% with no regional wall motion abnormalities.  Troponin peaked at 32,000, now trending down.  No further intervention per cardiology. In ICU, patient with increased hematemesis.  CTA chest A/P showed no acute bleeding. GI performed EGD which showed diffuse severe mucosal changes with increased vascular pattern of esophageal mucosa, reflux esophagitis, gastritis. No severe strictures or Olivia-Benson tears noted.  Epinephrine was injected 2 vertical lacerations of midesophagus. Did require blood transfusion 2 units PRBC after EGD, hemoglobin stabilized.  Patient then with increased oxygen requirements, concern for aspiration pneumonia.  Requiring BiPAP.  Concern for fluid overload and was started on IV Lasix as well as azithromycin and Zosyn for aspiration pneumonia coverage.  Repeat TTE showed worsening 
      Hospitalist Progress Note    NAME:   Lei Jaimes   : 1945   MRN: [id number]     Date/Time: 3/27/2025 2:57 PM  Patient PCP: Akash Contreras MD    Estimated discharge date: 24 to 48 hours  Barriers: Wean O2, improvement in kidney function, clinical improvement    Hospital Course:  Lei Jaimes is a 79 y.o.  male with PMHx significant for BPH, dysphagia, GERD, hypertension, left adrenal mass, esophageal stricture, who presented to the ED after episode of choking on coffee and a Bengali that resulted in vomiting and right-sided rib pain and dyspnea.  In ED, patient with bright red blood in vomit.  Started on octreotide and IV Protonix.  Additionally, echo at outside facility showed new regional wall motion abnormalities with rising troponin and EKG showed T wave abnormality with anterolateral ischemia but unchanged from before.  Transferred to [facility] ICU for further evaluation by GI for upper GI bleed and cardiology for NSTEMI.  Per cardiology, type II NSTEMI with mild troponin leak and atypical chest pain that is most likely related to patient's dysphagia with history of esophageal strictures.  Repeat echo here showed EF 50 to 55% with no regional wall motion abnormalities.  Troponin peaked at 32,000, now trending down.  No further intervention per cardiology. In ICU, patient with increased hematemesis.  CTA chest A/P showed no acute bleeding. GI performed EGD which showed diffuse severe mucosal changes with increased vascular pattern of esophageal mucosa, reflux esophagitis, gastritis. No severe strictures or Olivia-Benson tears noted.  Epinephrine was injected 2 vertical lacerations of midesophagus. Did require blood transfusion 2 units PRBC after EGD, hemoglobin stabilized.  Patient then with increased oxygen requirements, concern for aspiration pneumonia.  Requiring BiPAP.  Concern for fluid overload and was started on IV Lasix as well as azithromycin and Zosyn for aspiration pneumonia coverage.  
      Hospitalist Progress Note    NAME:   Lei Jaimes   : 1945   MRN: [id number]     Date/Time: 3/28/2025 4:08 PM  Patient PCP: Akash Contreras MD    Estimated discharge date: 24 to 48 hours  Barriers: Wean O2, improvement in kidney function, clinical improvement    Hospital Course:  Lei Jaimes is a 79 y.o.  male with PMHx significant for BPH, dysphagia, GERD, hypertension, left adrenal mass, esophageal stricture, who presented to the ED after episode of choking on coffee and a Macedonian that resulted in vomiting and right-sided rib pain and dyspnea.  In ED, patient with bright red blood in vomit.  Started on octreotide and IV Protonix.  Additionally, echo at outside facility showed new regional wall motion abnormalities with rising troponin and EKG showed T wave abnormality with anterolateral ischemia but unchanged from before.  Transferred to [facility] ICU for further evaluation by GI for upper GI bleed and cardiology for NSTEMI.  Per cardiology, type II NSTEMI with mild troponin leak and atypical chest pain that is most likely related to patient's dysphagia with history of esophageal strictures.  Repeat echo here showed EF 50 to 55% with no regional wall motion abnormalities.  Troponin peaked at 32,000, now trending down.  No further intervention per cardiology. In ICU, patient with increased hematemesis.  CTA chest A/P showed no acute bleeding. GI performed EGD which showed diffuse severe mucosal changes with increased vascular pattern of esophageal mucosa, reflux esophagitis, gastritis. No severe strictures or Olivia-Benson tears noted.  Epinephrine was injected 2 vertical lacerations of midesophagus. Did require blood transfusion 2 units PRBC after EGD, hemoglobin stabilized.  Patient then with increased oxygen requirements, concern for aspiration pneumonia.  Requiring BiPAP.  Concern for fluid overload and was started on IV Lasix as well as azithromycin and Zosyn for aspiration pneumonia coverage.  
      Hospitalist Progress Note    NAME:   Lei Jaimes   : 1945   MRN: [id number]     Date/Time: 3/30/2025 9:42 AM  Patient PCP: Akash Contreras MD    Estimated discharge date: 24 to 48 hours  Barriers: Wean O2, improvement in kidney function, clinical improvement    Hospital Course:  Lei Jaimes is a 79 y.o.  male with PMHx significant for BPH, dysphagia, GERD, hypertension, left adrenal mass, esophageal stricture, who presented to the ED after episode of choking on coffee and a Serbian that resulted in vomiting and right-sided rib pain and dyspnea.  In ED, patient with bright red blood in vomit.  Started on octreotide and IV Protonix.  Additionally, echo at outside facility showed new regional wall motion abnormalities with rising troponin and EKG showed T wave abnormality with anterolateral ischemia but unchanged from before.  Transferred to [facility] ICU for further evaluation by GI for upper GI bleed and cardiology for NSTEMI.  Per cardiology, type II NSTEMI with mild troponin leak and atypical chest pain that is most likely related to patient's dysphagia with history of esophageal strictures.  Repeat echo here showed EF 50 to 55% with no regional wall motion abnormalities.  Troponin peaked at 32,000, now trending down.  No further intervention per cardiology. In ICU, patient with increased hematemesis.  CTA chest A/P showed no acute bleeding. GI performed EGD which showed diffuse severe mucosal changes with increased vascular pattern of esophageal mucosa, reflux esophagitis, gastritis. No severe strictures or Olivia-Benson tears noted.  Epinephrine was injected 2 vertical lacerations of midesophagus. Did require blood transfusion 2 units PRBC after EGD, hemoglobin stabilized.  Patient then with increased oxygen requirements, concern for aspiration pneumonia.  Requiring BiPAP.  Concern for fluid overload and was started on IV Lasix as well as azithromycin and Zosyn for aspiration pneumonia coverage.  
    Gastroenterology Progress Note        Patient: Lei Jaimes MRN: [id number]  SSN: [id number]    YOB: 1945  Age: 79 y.o.  Sex: male      Admit Date: 3/13/2025    LOS: 4 days     Subjective:   Patient is examined   no hematemesis, no chest pain, no  nausea vomiting, no BM, shortness of breath on high flow.  Past Medical History:   Diagnosis Date    BPH (benign prostatic hyperplasia) 06/01/2021    Colon polyps 2007    RECTAL POLYP    Cyst of kidney, acquired     pt denies - states had kidney stones    Dysphagia 06/01/2021    Dysphagia     Enlarged prostate     Flatulence 06/01/2021    GERD (gastroesophageal reflux disease)     GERD (gastroesophageal reflux disease)     HTN (hypertension) 06/01/2021    Inguinal hernia     left side x 6 months    Kidney stones     Left adrenal mass 09/27/2004    ADENOMA  11-6-06    Reflux esophagitis 06/01/2021    Stricture of esophagus 06/01/2021        Current Facility-Administered Medications:     metoprolol tartrate (LOPRESSOR) tablet 25 mg, 25 mg, Oral, BID, Terrie Hopper MD, 25 mg at 03/17/25 0831    pantoprazole (PROTONIX) tablet 40 mg, 40 mg, Oral, BID AC, Terrie Hopper MD, 40 mg at 03/17/25 0831    atorvastatin (LIPITOR) tablet 40 mg, 40 mg, Oral, Nightly, Terrie Hopper MD    azithromycin (ZITHROMAX) 500 mg in sodium chloride 0.9 % 250 mL IVPB ([id number]), 500 mg, IntraVENous, Q24H, Terrie Hopper MD, Stopped at 03/17/25 0802    cefTRIAXone (ROCEPHIN) 1,000 mg in sterile water 10 mL IV syringe, 1,000 mg, IntraVENous, Q24H, Terrie Hopper MD, 1,000 mg at 03/17/25 0645    0.9 % sodium chloride infusion, , IntraVENous, PRN, Kne Bustos PA-C    0.9 % sodium chloride infusion, , IntraVENous, PRN, Terrie Hopper MD    sodium chloride flush 0.9 % injection 5-40 mL, 5-40 mL, IntraVENous, 2 times per day, Terrie Hopper MD, 10 mL at 03/17/25 0835    sodium chloride flush 0.9 % injection 5-40 mL, 5-40 mL, IntraVENous, 
    Gastroenterology Progress Note        Patient: Lei Jaimes MRN: [id number]  SSN: [id number]    YOB: 1945  Age: 79 y.o.  Sex: male      Admit Date: 3/13/2025    LOS: 5 days     Subjective:   Patient is examined   no hematemesis, no chest pain, no  nausea vomiting, no BM, shortness of breath   On O2   Past Medical History:   Diagnosis Date    BPH (benign prostatic hyperplasia) 06/01/2021    Colon polyps 2007    RECTAL POLYP    Cyst of kidney, acquired     pt denies - states had kidney stones    Dysphagia 06/01/2021    Dysphagia     Enlarged prostate     Flatulence 06/01/2021    GERD (gastroesophageal reflux disease)     GERD (gastroesophageal reflux disease)     HTN (hypertension) 06/01/2021    Inguinal hernia     left side x 6 months    Kidney stones     Left adrenal mass 09/27/2004    ADENOMA  11-6-06    Reflux esophagitis 06/01/2021    Stricture of esophagus 06/01/2021        Current Facility-Administered Medications:     atorvastatin (LIPITOR) tablet 80 mg, 80 mg, Oral, Nightly, Fatemeh Li MD    metoprolol tartrate (LOPRESSOR) tablet 25 mg, 25 mg, Oral, BID, Fatemeh Li MD    pantoprazole (PROTONIX) tablet 40 mg, 40 mg, Oral, BID AC, Terrie Hopper MD, 40 mg at 03/18/25 0601    sodium chloride flush 0.9 % injection 5-40 mL, 5-40 mL, IntraVENous, 2 times per day, Terrie Hopper MD, 10 mL at 03/18/25 0906    sodium chloride flush 0.9 % injection 5-40 mL, 5-40 mL, IntraVENous, PRN, Terrie Hopper MD    0.9 % sodium chloride infusion, , IntraVENous, PRN, Terrie Hopper MD    ondansetron (ZOFRAN-ODT) disintegrating tablet 4 mg, 4 mg, Oral, Q8H PRN **OR** ondansetron (ZOFRAN) injection 4 mg, 4 mg, IntraVENous, Q6H PRN, Terrie Hopper MD, 4 mg at 03/13/25 2001    polyethylene glycol (GLYCOLAX) packet 17 g, 17 g, Oral, Daily PRN, Terrie Hopper MD    albuterol (PROVENTIL) nebulizer solution 2.5 mg, 2.5 mg, Nebulization, Q2H PRN, Terrie Hopper MD    
    Gastroenterology Progress Note        Patient: Lei Jaimes MRN: [id number]  SSN: [id number]    YOB: 1945  Age: 79 y.o.  Sex: male      Admit Date: 3/13/2025    LOS: 3 days     Subjective:   Patient is examined   no hematemesis, no chest pain, no  nausea vomiting, had some dark stool, shortness of breath some degree.  Past Medical History:   Diagnosis Date    BPH (benign prostatic hyperplasia) 06/01/2021    Colon polyps 2007    RECTAL POLYP    Cyst of kidney, acquired     pt denies - states had kidney stones    Dysphagia 06/01/2021    Dysphagia     Enlarged prostate     Flatulence 06/01/2021    GERD (gastroesophageal reflux disease)     GERD (gastroesophageal reflux disease)     HTN (hypertension) 06/01/2021    Inguinal hernia     left side x 6 months    Kidney stones     Left adrenal mass 09/27/2004    ADENOMA  11-6-06    Reflux esophagitis 06/01/2021    Stricture of esophagus 06/01/2021        Current Facility-Administered Medications:     azithromycin (ZITHROMAX) 500 mg in sodium chloride 0.9 % 250 mL IVPB ([id number]), 500 mg, IntraVENous, Q24H, Terrie Hopper MD, Stopped at 03/16/25 0830    cefTRIAXone (ROCEPHIN) 1,000 mg in sterile water 10 mL IV syringe, 1,000 mg, IntraVENous, Q24H, Terrie Hopper MD, 1,000 mg at 03/16/25 0733    0.9 % sodium chloride infusion, , IntraVENous, PRN, Ken Bustos PA-C    0.9 % sodium chloride infusion, , IntraVENous, PRN, Terrie Hopper MD    sodium chloride flush 0.9 % injection 5-40 mL, 5-40 mL, IntraVENous, 2 times per day, Terrie Hopper MD, 10 mL at 03/15/25 2040    sodium chloride flush 0.9 % injection 5-40 mL, 5-40 mL, IntraVENous, PRN, Terrie Hopper MD    0.9 % sodium chloride infusion, , IntraVENous, PRN, Terrie Hopper MD    ondansetron (ZOFRAN-ODT) disintegrating tablet 4 mg, 4 mg, Oral, Q8H PRN **OR** ondansetron (ZOFRAN) injection 4 mg, 4 mg, IntraVENous, Q6H PRN, Terrie Hopper MD, 4 mg at 03/13/25 
    Gastroenterology Progress Note        Patient: Lei Jaimes MRN: [id number]  SSN: [id number]    YOB: 1945  Age: 79 y.o.  Sex: male      Admit Date: 3/13/2025    LOS: 2 days     Subjective:   Patient is examined, wife in the room, no hematemesis, no severe nausea vomiting, had some dark stool,  Past Medical History:   Diagnosis Date    BPH (benign prostatic hyperplasia) 06/01/2021    Colon polyps 2007    RECTAL POLYP    Cyst of kidney, acquired     pt denies - states had kidney stones    Dysphagia 06/01/2021    Dysphagia     Enlarged prostate     Flatulence 06/01/2021    GERD (gastroesophageal reflux disease)     GERD (gastroesophageal reflux disease)     HTN (hypertension) 06/01/2021    Inguinal hernia     left side x 6 months    Kidney stones     Left adrenal mass 09/27/2004    ADENOMA  11-6-06    Reflux esophagitis 06/01/2021    Stricture of esophagus 06/01/2021        Current Facility-Administered Medications:     0.9 % sodium chloride infusion, , IntraVENous, PRN, Ken Bustos PA-C    0.9 % sodium chloride infusion, , IntraVENous, PRN, Terrie Hopper MD    sodium chloride flush 0.9 % injection 5-40 mL, 5-40 mL, IntraVENous, 2 times per day, Terrie Hopper MD, 10 mL at 03/14/25 2022    sodium chloride flush 0.9 % injection 5-40 mL, 5-40 mL, IntraVENous, PRN, Terrie Hopper MD    0.9 % sodium chloride infusion, , IntraVENous, PRN, Terrie Hopper MD    ondansetron (ZOFRAN-ODT) disintegrating tablet 4 mg, 4 mg, Oral, Q8H PRN **OR** ondansetron (ZOFRAN) injection 4 mg, 4 mg, IntraVENous, Q6H PRN, Terrie Hopper MD, 4 mg at 03/13/25 2001    polyethylene glycol (GLYCOLAX) packet 17 g, 17 g, Oral, Daily PRN, Terrie Hopper MD    lactated ringers infusion, , IntraVENous, Continuous, Terrie Hopper MD, Last Rate: 125 mL/hr at 03/15/25 1133, New Bag at 03/15/25 1133    pantoprazole (PROTONIX) injection 40 mg, 40 mg, IntraVENous, BID, Terrie Hopper 
    Hospitalist Progress Note               Daily Progress Note: 3/19/2025      Hospital Day: 7     Chief complaint: No chief complaint on file.       Subjective:     Patient is seen today for follow-up. Patient seen examined at bedside with RN. Patient's oxygen requirement down from 6 L this a.m. to 3 L at 6 PM on my assessment.    Patient denies having any chest pain, shortness of breath, nausea/vomiting, palpitations. Patient is requesting to go home.      Medications reviewed  Current Facility-Administered Medications   Medication Dose Route Frequency    piperacillin-tazobactam (ZOSYN) 3,375 mg in sodium chloride 0.9 % 50 mL IVPB (addEASE)  3,375 mg IntraVENous q8h    atorvastatin (LIPITOR) tablet 80 mg  80 mg Oral Nightly    metoprolol tartrate (LOPRESSOR) tablet 25 mg  25 mg Oral BID    pantoprazole (PROTONIX) tablet 40 mg  40 mg Oral BID AC    sodium chloride flush 0.9 % injection 5-40 mL  5-40 mL IntraVENous 2 times per day    sodium chloride flush 0.9 % injection 5-40 mL  5-40 mL IntraVENous PRN    0.9 % sodium chloride infusion   IntraVENous PRN    ondansetron (ZOFRAN-ODT) disintegrating tablet 4 mg  4 mg Oral Q8H PRN    Or    ondansetron (ZOFRAN) injection 4 mg  4 mg IntraVENous Q6H PRN    polyethylene glycol (GLYCOLAX) packet 17 g  17 g Oral Daily PRN    albuterol (PROVENTIL) nebulizer solution 2.5 mg  2.5 mg Nebulization Q2H PRN    metoprolol (LOPRESSOR) injection 5 mg  5 mg IntraVENous Q6H PRN    hydrALAZINE (APRESOLINE) injection 10 mg  10 mg IntraVENous Q6H PRN    [Held by provider] aspirin EC tablet 81 mg  81 mg Oral Daily    [Held by provider] enoxaparin (LOVENOX) injection 40 mg  40 mg SubCUTAneous Daily       Review of Systems:   Pertinent items are noted in HPI.    Objective:   Physical Exam:     BP (!) 130/52   Pulse 96   Temp 99.5 °F (37.5 °C) (Axillary)   Resp 21   Ht 1.803 m (5' 11\") Comment: Pt stated  Wt 76.7 kg (169 lb 1.5 oz)   SpO2 100%   BMI 23.58 kg/m²  O2 Flow Rate (L/min): 8 
    Hospitalist Progress Note               Daily Progress Note: 3/21/2025      Hospital Day: 9     Chief complaint: No chief complaint on file.       Subjective:     Patient is seen today for follow-up. Patient seen examined at bedside with RN. Patient's oxygen requirement down from 6 L this a.m. to 3 L at 6 PM on my assessment.    Patient denies having any chest pain, shortness of breath, nausea/vomiting, palpitations. Patient is requesting to go home.      Medications reviewed  Current Facility-Administered Medications   Medication Dose Route Frequency    aspirin EC tablet 81 mg  81 mg Oral Daily    azithromycin (ZITHROMAX) tablet 250 mg  250 mg Oral Daily    hydrALAZINE (APRESOLINE) tablet 5 mg  5 mg Oral 3 times per day    furosemide (LASIX) tablet 20 mg  20 mg Oral Daily    piperacillin-tazobactam (ZOSYN) 3,375 mg in sodium chloride 0.9 % 50 mL IVPB (addEASE)  3,375 mg IntraVENous q8h    atorvastatin (LIPITOR) tablet 80 mg  80 mg Oral Nightly    metoprolol tartrate (LOPRESSOR) tablet 25 mg  25 mg Oral BID    pantoprazole (PROTONIX) tablet 40 mg  40 mg Oral BID AC    sodium chloride flush 0.9 % injection 5-40 mL  5-40 mL IntraVENous 2 times per day    sodium chloride flush 0.9 % injection 5-40 mL  5-40 mL IntraVENous PRN    0.9 % sodium chloride infusion   IntraVENous PRN    ondansetron (ZOFRAN-ODT) disintegrating tablet 4 mg  4 mg Oral Q8H PRN    Or    ondansetron (ZOFRAN) injection 4 mg  4 mg IntraVENous Q6H PRN    polyethylene glycol (GLYCOLAX) packet 17 g  17 g Oral Daily PRN    albuterol (PROVENTIL) nebulizer solution 2.5 mg  2.5 mg Nebulization Q2H PRN    metoprolol (LOPRESSOR) injection 5 mg  5 mg IntraVENous Q6H PRN    hydrALAZINE (APRESOLINE) injection 10 mg  10 mg IntraVENous Q6H PRN    [Held by provider] enoxaparin (LOVENOX) injection 40 mg  40 mg SubCUTAneous Daily       Review of Systems:   Pertinent items are noted in HPI.    Objective:   Physical Exam:     /61   Pulse (!) 103   Temp 97.9 
    Hospitalist Progress Note               Daily Progress Note: 3/23/2025      Hospital Day: 11     Chief complaint: No chief complaint on file.       Brief HPI/ Hospital course to date:  Lei Jaimes is a 79 y.o. male with pmh as below who presents with choking this am. He states he was drinking his coffee and eating a Mongolian when he started coughing and felt as thought the food was stuck mid esophagus. He states he panicked, became anxious, started coughing then threw up. He states he has a history of strictures and is followed by GI for dilatations. He states he is now having right sided rib pain that does not radiate, is associated with diaphoresis, fatigue, emesis, and obvious SOB from choking. He states he continues to throw up and had some small blood after his very hard throw up, which has not happened since. He notes he has upper abd pain that is relieved by belching. He denies any fevers, chills, dysuria, hematuria, or frequency. He notes history of BPH but is not having any LUTS at this time.      This am he was noted to have increased hematemesis and the transfer request for GI and cardiology evaluation was made. He currently arrived on octreotide and LR with stable hemodynamics. He denies any pain currently.       TTE with LVEF 50-55%, mild AI, mildly increased thickness, no regional wall motion abnormalities   Interval TTE with LVEF 25-30%, mild AI and global hypokinesis with LV enlargement. Cardiology recommend GDMT.     EGD showed deep vertical laceration at the mid esophagus, with vessel in the basal, and epinephrine injected, per GI.    Otherwise, Nephrology following for worsening SARA. Thought to be due to prerenal, cardiorenal, and diuresis. Cr improving now.    Respiratory failure due to aspiration PNA and fluid overload. Continue to wean O2. Pulm was consulted on 3/21.    Leukocytosis worsening, so UA ordered. CXR with no infection. Consider blood clx. No sepsis alert.     --------  Patient 
    Hospitalist Progress Note               Daily Progress Note: 3/24/2025      Hospital Day: 12     Chief complaint: No chief complaint on file.       Brief HPI/ Hospital course to date:  Lei Jaimes is a 79 y.o. male with pmh as below who presents with choking this am. He states he was drinking his coffee and eating a Vietnamese when he started coughing and felt as thought the food was stuck mid esophagus. He states he panicked, became anxious, started coughing then threw up. He states he has a history of strictures and is followed by GI for dilatations. He states he is now having right sided rib pain that does not radiate, is associated with diaphoresis, fatigue, emesis, and obvious SOB from choking. He states he continues to throw up and had some small blood after his very hard throw up, which has not happened since. He notes he has upper abd pain that is relieved by belching. He denies any fevers, chills, dysuria, hematuria, or frequency. He notes history of BPH but is not having any LUTS at this time.      This am he was noted to have increased hematemesis and the transfer request for GI and cardiology evaluation was made. He currently arrived on octreotide and LR with stable hemodynamics. He denies any pain currently.       TTE with LVEF 50-55%, mild AI, mildly increased thickness, no regional wall motion abnormalities   Interval TTE with LVEF 25-30%, mild AI and global hypokinesis with LV enlargement. Cardiology recommend GDMT.     EGD showed deep vertical laceration at the mid esophagus, with vessel in the basal, and epinephrine injected, per GI.    Otherwise, Nephrology following for worsening SARA. Thought to be due to prerenal, cardiorenal, and diuresis. Cr improving now.    Respiratory failure due to aspiration PNA and fluid overload. Continue to wean O2. Pulm was consulted on 3/21.    Leukocytosis worsening, so UA ordered. CXR with no infection. Consider blood clx. No sepsis alert.     --------  Patient 
   04/03/25 0907   Resting (Room Air)   SpO2 94      During Walk (Room Air)   SpO2 89      Rate of Dyspnea 1   Symptoms Fatigue   After Walk   SpO2 95      Does the Patient Qualify for Home O2 No       
  3/14/2025 10:05 AM  NAME: Lei Jaimes   MRN:  [id number]   Admit Diagnosis: Upper GI bleed [K92.2]  GIB (gastrointestinal bleeding) [K92.2]          Assessment/Plan:   Dysphagia, nausea, vomiting, improved.  Patient is asking for food.  Has not eaten anything for 3 days.    Mild troponin leak, as normalized.  EKG was abnormal with T wave abnormality with anterolateral ischemia without interval changes from before.  Will repeat EKG today.  Echo on this admission with normal LV function.    Chronic kidney disease, rising creatinine, rising BUN.  Will need hydration.  Normal LV function.    Anemia, significant drop in hemoglobin.    History of esophageal stricture and dilatation, CT chest/abdomen with dilated fluid-filled esophagus with food stuff in the distal esophagus.         []       High complexity decision making was performed in this patient at high risk for decompensation with multiple organ involvement.    Subjective:     Lei Jaimes denies chest pain, dyspnea.  Feeling hungry  Discussed with RN events overnight.     Review of Systems:    Symptom Y/N Comments  Symptom Y/N Comments   Fever/Chills N   Chest Pain N    Poor Appetite N   Edema N    Cough N   Abdominal Pain N    Sputum N   Joint Pain N    SOB/VELAZQUEZ N   Pruritis/Rash N    Nausea/vomit N   Tolerating PT/OT Y    Diarrhea N   Tolerating Diet Y    Constipation N   Other       Could NOT obtain due to:      Objective:      Physical Exam:    Last 24hrs VS reviewed since prior progress note. Most recent are:    BP (!) 129/43   Pulse 82   Temp 98.4 °F (36.9 °C) (Oral)   Resp 24   Ht 1.803 m (5' 11\") Comment: Pt stated  Wt 73.5 kg (162 lb 0.6 oz)   SpO2 98%   BMI 22.60 kg/m²     Intake/Output Summary (Last 24 hours) at 3/14/2025 1005  Last data filed at 3/14/2025 0700  Gross per 24 hour   Intake 1934.49 ml   Output 520 ml   Net 1414.49 ml        General Appearance: Well developed, well nourished, alert; no acute distress.  Ears/Nose/Mouth/Throat: 
  3/15/2025 9:40 AM  NAME: Lei Jaimes   MRN:  [id number]   Admit Diagnosis: Upper GI bleed [K92.2]  GIB (gastrointestinal bleeding) [K92.2]          Assessment/Plan:   Dysphagia, nausea, vomiting, improved.  Patient is asking for food.  Has not eaten anything for 3 days.    Was with a mild troponin leak, now with significant jump.  EKG was abnormal with T wave abnormality with anterolateral ischemia without interval changes from before.  Repeat EKG with anterior ST changes.  Patient denies any chest pain or shortness of breath.  Echo on this admission with normal LV function.  No significant wall motion abnormalities.  Will repeat limited echo.  Cannot use antiplatelet therapy with active bleeding.    Chronic kidney disease, decreasing BUN and creatinine.    Anemia, hemoglobin continues to drop.    History of esophageal stricture and dilatation, CT chest/abdomen with dilated fluid-filled esophagus with food stuff in the distal esophagus.  Endoscopy with mid esophageal.  Endoscopy with mid esophageal tear, friable, with spontaneous bleeding.         []       High complexity decision making was performed in this patient at high risk for decompensation with multiple organ involvement.    Subjective:     Lei Jaimes denies chest pain, dyspnea.  Wants to eat and drink.    Discussed with RN events overnight.     Review of Systems:    Symptom Y/N Comments  Symptom Y/N Comments   Fever/Chills N   Chest Pain N    Poor Appetite N   Edema N    Cough N   Abdominal Pain N    Sputum N   Joint Pain N    SOB/VELAZQUEZ N   Pruritis/Rash N    Nausea/vomit N   Tolerating PT/OT Y    Diarrhea N   Tolerating Diet Y    Constipation N   Other       Could NOT obtain due to:      Objective:      Physical Exam:    Last 24hrs VS reviewed since prior progress note. Most recent are:    /61   Pulse 98   Temp 98.2 °F (36.8 °C) (Oral)   Resp 19   Ht 1.803 m (5' 11\") Comment: Pt stated  Wt 73.2 kg (161 lb 6 oz)   SpO2 94%   BMI 22.51 
  3/16/2025 9:41 AM  NAME: Lei Jaimes   MRN:  [id number]   Admit Diagnosis: Upper GI bleed [K92.2]  GIB (gastrointestinal bleeding) [K92.2]          Assessment/Plan:   Respiratory distress and tachycardia,?  Aspiration, continues on high flow oxygen.  Using beta-blockade for tachycardia.  Chest x-ray with increasing interstitial pulmonary edema and CHF, left greater than right pleural effusion.    Dysphagia, nausea, vomiting, improved.      Was with a mild troponin leak, now with significant jump.  EKG was abnormal with T wave abnormality with anterolateral ischemia without interval changes from before.  Repeat EKG with anterior ST changes.  Patient denies any chest pain or shortness of breath.  Echo on this admission with normal LV function.  No significant wall motion abnormalities.  Will repeat limited echo.  Cannot use antiplatelet therapy with active bleeding.    Chronic kidney disease, decreasing BUN and creatinine.    Anemia, improved hemoglobin.    History of esophageal stricture and dilatation, CT chest/abdomen with dilated fluid-filled esophagus with food stuff in the distal esophagus.  Endoscopy with mid esophageal.  Endoscopy with mid esophageal tear, friable, with spontaneous bleeding.         []       High complexity decision making was performed in this patient at high risk for decompensation with multiple organ involvement.    Subjective:     Lei Jaimes denies chest pain, dyspnea.  Says he is peeing well.  Discussed with RN events overnight.     Review of Systems:    Symptom Y/N Comments  Symptom Y/N Comments   Fever/Chills N   Chest Pain N    Poor Appetite N   Edema N    Cough N   Abdominal Pain N    Sputum N   Joint Pain N    SOB/VELAZQUEZ N   Pruritis/Rash N    Nausea/vomit N   Tolerating PT/OT Y    Diarrhea N   Tolerating Diet Y    Constipation N   Other       Could NOT obtain due to:      Objective:      Physical Exam:    Last 24hrs VS reviewed since prior progress note. Most recent are:    BP 
  IMPRESSION:   Acute hypoxic respiratory failure 10 L mid flow  Esophageal stricture mid esophageal ulcer with GI bleed  Aspiration pneumonia  Chronic obstructive pulmonary disease  Acute kidney injury  Chronic kidney disease stage III  Status post non-STEMI  Additional workup outlined below  Pt is at high risk of sudden decline and decompensation with life threatening consequenses and continued end organ dysfunction and failure  Pt is critically ill. Time spent with pt and staff actively rendering care, managing pt and coordinating care as stated below; 35 minutes, exclusive of any procedures      RECOMMENDATIONS/PLAN:     79-year-old male came in because of choking event and also having right-sided chest pain patient had hematemesis and also having shortness of breath dyspnea he was hypoxic he was put on oxygen via nasal cannula during this admission he has an EGD done which shows diffuse mucosal changes with erosions and hemorrhage middle of the esophagus patient had bleeding troponin was elevated patient has non-ST CÉSAR ejection fraction about 45 to 30% also patient has acute kidney injury with history of chronic kidney disease chest x-ray shows pulmonary edema with possible infiltrate treated with antibiotic patient was put on noninvasive ventilator BiPAP machine history of smoking in the past 40 years ago not on any inhalers or oxygen at home  Received 3 doses of IV Solu-Medrol start patient nebulizer treatment and Pulmicort via neb  Patient is on 10 L nasal Cannula oxygen as salvage oxygen delivery device to provide high concentration of oxygen to overcome refractory hypoxia; patient had arterial blood gases done on 319 which shows pCO2 33 pO2 102 60% FiO2 that was on BiPAP no CO2 retention discontinue BiPAP, probably will need oxygen when discharged home will get overnight pulse oximetry is complaining about left knee pain recommend PT OT  Chest x-ray shows mild pulmonary edema he aspirated high risk because 
  IMPRESSION:   Acute hypoxic respiratory failure on 2 L  Esophageal stricture mid esophageal ulcer with GI bleed  Aspiration pneumonia  Chronic obstructive pulmonary disease  Acute kidney injury  Chronic kidney disease stage III  Status post non-STEMI  Additional workup outlined below  Pt is at high risk of sudden decline and decompensation with life threatening consequenses and continued end organ dysfunction and failure  Pt is critically ill. Time spent with pt and staff actively rendering care, managing pt and coordinating care as stated below; 35 minutes, exclusive of any procedures      RECOMMENDATIONS/PLAN:     79-year-old male came in because of choking event and also having right-sided chest pain patient had hematemesis and also having shortness of breath dyspnea he was hypoxic he was put on oxygen via nasal cannula during this admission he has an EGD done which shows diffuse mucosal changes with erosions and hemorrhage middle of the esophagus patient had bleeding troponin was elevated patient has non-ST CÉSAR ejection fraction about 45 to 30% also patient has acute kidney injury with history of chronic kidney disease chest x-ray shows pulmonary edema with possible infiltrate treated with antibiotic patient was put on noninvasive ventilator BiPAP machine history of smoking in the past 40 years ago not on any inhalers or oxygen at home  Received 3 doses of IV Solu-Medrol start patient nebulizer treatment and Pulmicort via neb  Patient is on 10 L nasal Cannula oxygen as salvage oxygen delivery device to provide high concentration of oxygen to overcome refractory hypoxia; patient had arterial blood gases done on 319 which shows pCO2 33 pO2 102 60% FiO2 that was on BiPAP no CO2 retention discontinue BiPAP, probably will need oxygen when discharged home will get overnight pulse oximetry is complaining about left knee pain recommend PT OT  Chest x-ray shows mild pulmonary edema he aspirated high risk because of 
  IMPRESSION:   Acute hypoxic respiratory failure on 4 L  Esophageal stricture mid esophageal ulcer with GI bleed  Aspiration pneumonia  Chronic obstructive pulmonary disease  Acute kidney injury  Chronic kidney disease stage III  Status post non-STEMI  Additional workup outlined below  Pt is at high risk of sudden decline and decompensation with life threatening consequenses and continued end organ dysfunction and failure  Pt is critically ill. Time spent with pt and staff actively rendering care, managing pt and coordinating care as stated below; 35 minutes, exclusive of any procedures      RECOMMENDATIONS/PLAN:     79-year-old male came in because of choking event and also having right-sided chest pain patient had hematemesis and also having shortness of breath dyspnea he was hypoxic he was put on oxygen via nasal cannula during this admission he has an EGD done which shows diffuse mucosal changes with erosions and hemorrhage middle of the esophagus patient had bleeding troponin was elevated patient has non-ST CÉSAR ejection fraction about 45 to 30% also patient has acute kidney injury with history of chronic kidney disease chest x-ray shows pulmonary edema with possible infiltrate treated with antibiotic patient was put on noninvasive ventilator BiPAP machine history of smoking in the past 40 years ago not on any inhalers or oxygen at home  Received 3 doses of IV Solu-Medrol start patient nebulizer treatment and Pulmicort via neb  Patient is on 10 L nasal Cannula oxygen as salvage oxygen delivery device to provide high concentration of oxygen to overcome refractory hypoxia; patient had arterial blood gases done on 319 which shows pCO2 33 pO2 102 60% FiO2 that was on BiPAP no CO2 retention discontinue BiPAP, probably will need oxygen when discharged home will get overnight pulse oximetry is complaining about left knee pain recommend PT OT  Chest x-ray shows mild pulmonary edema he aspirated high risk because of 
  IMPRESSION:   Acute hypoxic respiratory failure on 4 L  Esophageal stricture mid esophageal ulcer with GI bleed  Aspiration pneumonia  Pleural effusion  Chronic obstructive pulmonary disease  Acute kidney injury  Chronic kidney disease stage III  Status post non-STEMI  Additional workup outlined below  Pt is at high risk of sudden decline and decompensation with life threatening consequenses and continued end organ dysfunction and failure  Pt is critically ill. Time spent with pt and staff actively rendering care, managing pt and coordinating care as stated below; 30 minutes, exclusive of any procedures      RECOMMENDATIONS/PLAN:     79-year-old male came in because of choking event and also having right-sided chest pain patient had hematemesis and also having shortness of breath dyspnea he was hypoxic he was put on oxygen via nasal cannula during this admission he has an EGD done which shows diffuse mucosal changes with erosions and hemorrhage middle of the esophagus patient had bleeding troponin was elevated patient has non-ST CÉSAR ejection fraction about 45 to 30% also patient has acute kidney injury with history of chronic kidney disease chest x-ray shows pulmonary edema with possible infiltrate treated with antibiotic patient was put on noninvasive ventilator BiPAP machine history of smoking in the past 40 years ago not on any inhalers or oxygen at home  Received 3 doses of IV Solu-Medrol start patient nebulizer treatment and Pulmicort via neb  Patient is on 10 L nasal Cannula oxygen as salvage oxygen delivery device to provide high concentration of oxygen to overcome refractory hypoxia; patient had arterial blood gases done on 319 which shows pCO2 33 pO2 102 60% FiO2 that was on BiPAP no CO2 retention discontinue BiPAP, probably will need oxygen when discharged home will get overnight pulse oximetry is complaining about left knee pain recommend PT OT  Chest x-ray shows mild pulmonary edema he aspirated high 
  IMPRESSION:   Acute hypoxic respiratory failure on 6 L  Esophageal stricture mid esophageal ulcer with GI bleed  Aspiration pneumonia  Pleural effusion  Chronic obstructive pulmonary disease  Acute kidney injury  Chronic kidney disease stage III  Status post non-STEMI  Additional workup outlined below  Pt is at high risk of sudden decline and decompensation with life threatening consequenses and continued end organ dysfunction and failure  Pt is critically ill. Time spent with pt and staff actively rendering care, managing pt and coordinating care as stated below; 30 minutes, exclusive of any procedures      RECOMMENDATIONS/PLAN:     79-year-old male came in because of choking event and also having right-sided chest pain patient had hematemesis and also having shortness of breath dyspnea he was hypoxic he was put on oxygen via nasal cannula during this admission he has an EGD done which shows diffuse mucosal changes with erosions and hemorrhage middle of the esophagus patient had bleeding troponin was elevated patient has non-ST CÉSAR ejection fraction about 45 to 30% also patient has acute kidney injury with history of chronic kidney disease chest x-ray shows pulmonary edema with possible infiltrate treated with antibiotic patient was put on noninvasive ventilator BiPAP machine history of smoking in the past 40 years ago not on any inhalers or oxygen at home  Received 3 doses of IV Solu-Medrol start patient nebulizer treatment and Pulmicort via neb  Patient is on 10 L nasal Cannula oxygen as salvage oxygen delivery device to provide high concentration of oxygen to overcome refractory hypoxia; patient had arterial blood gases done on 319 which shows pCO2 33 pO2 102 60% FiO2 that was on BiPAP no CO2 retention discontinue BiPAP, probably will need oxygen when discharged home will get overnight pulse oximetry is complaining about left knee pain recommend PT OT  Chest x-ray shows mild pulmonary edema he aspirated high 
  Physician Progress Note      PATIENT:               VIVIANA AMARAL  CSN #:                  [id number]  :                       1945  ADMIT DATE:       3/13/2025 1:35 PM  DISCH DATE:  RESPONDING  PROVIDER #:        Terrie Hopper MD          QUERY TEXT:    Pt with CHF and developed \"pulm edema\" per CXR and \"rhonchi\" per Cards note   dated 3/17.  Pt given IV Lasix. If possible, please document in progress notes   and discharge summary further specificity regarding the type and acuity of   CHF:    The medical record reflects the following:  Risk Factors: CKD, HTN    Clinical Indicators:    Echo 3/13:  Low normal left ventricular systolic function with a visually estimated EF of   50 - 55%. Left ventricle size is normal. Mildly increased wall thickness. No   regional wall motion abnormalities identified    Echo 3/16:  Reduced left ventricular systolic function with a visually estimated EF of 25   - 30%. Left ventricle is dilated. Increased wall thickness. EF BP is 27%.   Global hypokinesis present.    CXR 3/16: Congestive failure with interstitial pulmonary edema and left   greater than right pleural effusion and overlying atelectasis.    Cards 3/17: Lungs: Scattered rhonchi diffusely    Treatment: IV Lasix  Options provided:  -- Acute on Chronic Systolic CHF/HFrEF  -- Acute on Chronic Diastolic CHF/HFpEF  -- Other - I will add my own diagnosis  -- Disagree - Not applicable / Not valid  -- Disagree - Clinically unable to determine / Unknown  -- Refer to Clinical Documentation Reviewer    PROVIDER RESPONSE TEXT:    Acute CHF    Query created by: Eliza Perez on 3/18/2025 10:00 AM      QUERY TEXT:    Pt with increasing O2 needs and has respiratory failure documented in Cards   note dated 3/17. If possible, please document in progress notes and discharge   summary further specificity regarding the acuity of respiratory failure:    The medical record reflects the following:  Risk Factors: CHF    Clinical 
  Physician Progress Note      PATIENT:               VIVIANA AMARAL  CSN #:                  [id number]  :                       1945  ADMIT DATE:       3/13/2025 1:35 PM  DISCH DATE:  RESPONDING  PROVIDER #:        Bebeto Hendricks MD          QUERY TEXT:    Noted documentation of acute cystitis throughout the chart. UA with neg Nitr   and neg Leuks, WBC 0-4.  No UCx collected. In order to support the diagnosis   of acute cystitis, please include additional clinical indicators in your   documentation.  Or please document if the diagnosis of acute cystitis has been   ruled out after further study.    The medical record reflects the following:  Risk Factors: 78yo female    Clinical Indicators:    UA- Blood TR, Nitr neg, Leuks neg, WBC 0-4  UCx- not collected    Treatment: Levaquin on 3/12  Options provided:  -- Acute cystitis present as evidenced by, Please document evidence.  -- Acute cystitis was ruled out  -- Other - I will add my own diagnosis  -- Disagree - Not applicable / Not valid  -- Disagree - Clinically unable to determine / Unknown  -- Refer to Clinical Documentation Reviewer    PROVIDER RESPONSE TEXT:    Acute cystitis was ruled out after study.    Query created by: Eliza Perez on 3/21/2025 10:49 AM      Electronically signed by:  Bebeto Hendricks MD 3/21/2025 12:19 PM          
  Physician Progress Note      PATIENT:               VIVIANA AMARAL  CSN #:                  [id number]  :                       1945  ADMIT DATE:       3/13/2025 1:35 PM  DISCH DATE:  RESPONDING  PROVIDER #:        Davida Yen          QUERY TEXT:    Pt noted to have leukocytosis, elev procal, elev CRP on admission. LRs @ 125   started on admission, IV abx started on day3.  If possible, please document in   the progress notes and discharge summary if you are evaluating and /or   treating any of the following:  The medical record reflects the following:    Risk Factors: c/f aspiration    Clinical Indicators:    WBC range 22.2-23.9-19.0-18.0-14.3-17.6-13.8-12.0... w/ L shift  LA 1.2-1.6  Procal 8.57-0.40-0.53-0.36-0.33  CRP 12.50  BCx no growth  Sputum Cx Heavy Yeast  HR   RR 16-35    CXR 3/13: New opacities at the left lung base consistent with small left   pleural effusion and severe left lower lobe atelectasis  CXR 3/19: Congestive failure with interstitial pulmonary edema and bilateral   pleural effusions with overlying atelectasis that could obscure pneumonic  infiltrate.    Treatment: IV Zithromax/IV Rocephin started day 3, LR @ 125ml/hr; IV Zosyn   added day 7  Options provided:  -- Sepsis, present on admission  -- Sepsis, developed following admission  -- Other - I will add my own diagnosis  -- Disagree - Not applicable / Not valid  -- Disagree - Clinically unable to determine / Unknown  -- Refer to Clinical Documentation Reviewer    PROVIDER RESPONSE TEXT:    This patient has sepsis that developed following admission.    Query created by: Eliza Perez on 3/27/2025 8:50 AM      Electronically signed by:  Davida Yen 3/27/2025 5:39 PM          
0750 - Bedside report with primary RN, pt resting comfortably in bed with wife at bedside, no needs voiced at this time, call bell left within reach    0830 - Pt resting comfortably in bed with wife at bedside, no needs voiced at this time, call bell left within reach    0930 - Pt resting comfortably in bed with son at bedside, eating breakfast at this time, no needs voiced at this time, call bell left within reach    1015 - Pt resting comfortably in bed with son at bedside, no needs voiced at this time, call bell left within reach, patient recalls events that led up to readmission to ICU    1145 - Pt resting comfortably in bed with son at bedside, patient on 4 L/min via nasal canula at this time. No needs voiced at this time, call bell left within reach    1220 - Pt resting comfortably in bed with son at bedside, patient on 4 L/min via nasal canula at this time. External catheter leaking, replaced with primary RN, & pad changed. No other needs voiced at this time, call bell left within reach    
4 Eyes Skin Assessment     NAME:  Lei Jaimes  YOB: 1945  MEDICAL RECORD NUMBER:  [id number]    The patient is being assessed for  Transfer to New Unit    I agree that at least one RN has performed a thorough Head to Toe Skin Assessment on the patient. ALL assessment sites listed below have been assessed.      Areas assessed by both nurses:    Head, Face, Ears, Shoulders, Back, Chest, Arms, Elbows, Hands, Sacrum. Buttock, Coccyx, Ischium, Legs. Feet and Heels, and Under Medical Devices         Does the Patient have a Wound? YES (sacrum)       Alan Prevention initiated by RN: Yes  Wound Care Orders initiated by RN: Yes    Pressure Injury (Stage 3,4, Unstageable, DTI, NWPT, and Complex wounds) if present, place Wound referral order by RN under : Yes    New Ostomies, if present place, Ostomy referral order under : Yes     Nurse 1 eSignature: Electronically signed by LASHA CARPENTER RN on 3/29/25 at 12:22 AM EDT    **SHARE this note so that the co-signing nurse can place an eSignature**    Nurse 2 eSignature: Electronically signed by Violeta Monzon RN on 3/29/25 at 1:06 AM EDT   
CRITICAL CARE PROGRESS NOTE    Name: Lei Jaimes   : 1945   MRN: [id number]   Date: 3/16/2025      Diagnoses/problem list:   NSTEMI  SARA on CKD stage 3b  HTN  BPH  Esophageal stenosis s/p multiple dilations  Acute cystitis without hematuria  Gastrointestinal bleed  Possible PAD    24-hour events:   Lei Jaimes is a 79 y.o. male with pmh as below who presents with choking this am. He states he was drinking his coffee and eating a [place] when he started coughing and felt as thought the food was stuck mid esophagus. He states he panicked, became anxious, started coughing then threw up. He states he has a history of strictures and is followed by GI for dilatations. He states he is now having right sided rib pain that does not radiate, is associated with diaphoresis, fatigue, emesis, and obvious SOB from choking. He states he continues to throw up and had some small blood after his very hard throw up, which has not happened since. He notes he has upper abd pain that is relieved by belching. He denies any fevers, chills, dysuria, hematuria, or frequency. He notes history of BPH but is not having any LUTS at this time.      This am he was noted to have increased hematemesis and the transfer request for GI and cardiology evaluation was made. He currently arrived on octreotide and LR with stable hemodynamics. He denies any pain currently.     3/14: noted to have more hematemesis last evening and went for a CTA chest/A/P with no acute bleeding identified. Plan for EGD today. Cardiology consulted and agree with NSTEMI, type II. Recommend anticoagulation when able.   3/15: Hb <7 this am and received 2u pRBC. No chest pain today with elevation in troponin. Cardiology reviewed troponin level and EKG with no further intervention at this time.   3/16: had increased oxygen requirements overnight with concern for aspiration pneumonia    ROS negative except as otherwise documented.     Assessment and plan:     NEUROLOGICAL: 
CRITICAL CARE PROGRESS NOTE    Name: Lei Jaimes   : 1945   MRN: [id number]   Date: 3/15/2025      Diagnoses/problem list:   NSTEMI  SARA on CKD stage 3b  HTN  BPH  Esophageal stenosis s/p multiple dilations  Acute cystitis without hematuria  Gastrointestinal bleed  Possible PAD    24-hour events:   Lei Jaimes is a 79 y.o. male with pmh as below who presents with choking this am. He states he was drinking his coffee and eating a [place] when he started coughing and felt as thought the food was stuck mid esophagus. He states he panicked, became anxious, started coughing then threw up. He states he has a history of strictures and is followed by GI for dilatations. He states he is now having right sided rib pain that does not radiate, is associated with diaphoresis, fatigue, emesis, and obvious SOB from choking. He states he continues to throw up and had some small blood after his very hard throw up, which has not happened since. He notes he has upper abd pain that is relieved by belching. He denies any fevers, chills, dysuria, hematuria, or frequency. He notes history of BPH but is not having any LUTS at this time.      This am he was noted to have increased hematemesis and the transfer request for GI and cardiology evaluation was made. He currently arrived on octreotide and LR with stable hemodynamics. He denies any pain currently.     3/14: noted to have more hematemesis last evening and went for a CTA chest/A/P with no acute bleeding identified. Plan for EGD today. Cardiology consulted and agree with NSTEMI, type II. Recommend anticoagulation when able.   3/15: Hb <7 this am and received 2u pRBC. No chest pain today with elevation in troponin. Cardiology reviewed troponin level and EKG with no further intervention at this time.     ROS negative except as otherwise documented.     Assessment and plan:     NEUROLOGICAL:    Analgesia / sedation  -alert, oriented  -reports previous abdominal pain 
CRITICAL CARE PROGRESS NOTE    Name: Lei Jaimes   : 1945   MRN: [id number]   Date: 3/17/2025      Diagnoses/problem list:   NSTEMI  SARA on CKD stage 3b  HTN  BPH  Esophageal stenosis s/p multiple dilations  Acute cystitis without hematuria  Gastrointestinal bleed  Possible PAD    24-hour events:   Lei Jaimes is a 79 y.o. male with pmh as below who presents with choking this am. He states he was drinking his coffee and eating a [place] when he started coughing and felt as thought the food was stuck mid esophagus. He states he panicked, became anxious, started coughing then threw up. He states he has a history of strictures and is followed by GI for dilatations. He states he is now having right sided rib pain that does not radiate, is associated with diaphoresis, fatigue, emesis, and obvious SOB from choking. He states he continues to throw up and had some small blood after his very hard throw up, which has not happened since. He notes he has upper abd pain that is relieved by belching. He denies any fevers, chills, dysuria, hematuria, or frequency. He notes history of BPH but is not having any LUTS at this time.      This am he was noted to have increased hematemesis and the transfer request for GI and cardiology evaluation was made. He currently arrived on octreotide and LR with stable hemodynamics. He denies any pain currently.     3/14: noted to have more hematemesis last evening and went for a CTA chest/A/P with no acute bleeding identified. Plan for EGD today. Cardiology consulted and agree with NSTEMI, type II. Recommend anticoagulation when able.   3/15: Hb <7 this am and received 2u pRBC. No chest pain today with elevation in troponin. Cardiology reviewed troponin level and EKG with no further intervention at this time.   3/16: had increased oxygen requirements overnight with concern for aspiration pneumonia  3/17: TTE with worsening LVEF. Required BIPAP overnight for increased oxygen 
CRITICAL CARE PROGRESS NOTE    Name: Lei Jaimes   : 1945   MRN: [id number]   Date: 3/14/2025      Diagnoses/problem list:   NSTEMI  SARA on CKD stage 3b  HTN  BPH  Esophageal stenosis s/p multiple dilations  Acute cystitis without hematuria  Gastrointestinal bleed  Possible PAD    24-hour events:   Lei Jaimes is a 79 y.o. male with pmh as below who presents with choking this am. He states he was drinking his coffee and eating a [place] when he started coughing and felt as thought the food was stuck mid esophagus. He states he panicked, became anxious, started coughing then threw up. He states he has a history of strictures and is followed by GI for dilatations. He states he is now having right sided rib pain that does not radiate, is associated with diaphoresis, fatigue, emesis, and obvious SOB from choking. He states he continues to throw up and had some small blood after his very hard throw up, which has not happened since. He notes he has upper abd pain that is relieved by belching. He denies any fevers, chills, dysuria, hematuria, or frequency. He notes history of BPH but is not having any LUTS at this time.      This am he was noted to have increased hematemesis and the transfer request for GI and cardiology evaluation was made. He currently arrived on octreotide and LR with stable hemodynamics. He denies any pain currently.     3/14: noted to have more hematemesis last evening and went for a CTA chest/A/P with no acute bleeding identified. Plan for EGD today. Cardiology consulted and agree with NSTEMI, type II. Recommend anticoagulation when able.     ROS negative except as otherwise documented.     Assessment and plan:     NEUROLOGICAL:    Analgesia / sedation  -alert, oriented  -reports previous abdominal pain resolved  -dilaudid PRN    PULMONOLOGY:   No acute pulmonary concerns  -Goal SpO2>=92, pH>=7.30  -Trend ABG / CXR  -Albuterol / Atrovent Q4hr PRN    CARDIOVASCULAR:   NSTEMI, type 
Comprehensive Nutrition Assessment    Type and Reason for Visit:  Reassess    Nutrition Recommendations/Plan:   Monitor renal lytes and H/H  Continue current diet and ONS (ETC/GI bland per SLP)  Defer fluid needs to provider/neph d/t continued need for diuresis     Malnutrition Assessment:  Malnutrition Status:  At risk for malnutrition (03/26/25 1443)    Context:  Acute Illness     Findings of the 6 clinical characteristics of malnutrition:  Energy Intake:  75% or less of estimated energy requirements for 7 or more days  Weight Loss:  Mild weight loss (3.2% x1yr)     Body Fat Loss:  No body fat loss     Muscle Mass Loss:  No muscle mass loss    Fluid Accumulation:  Mild (suspect not r/t nutritional status) Extremities (not r/t nutr status)   Strength:  Not Performed    Nutrition Assessment:    Initially admitted to [facility] acute cystitis w/o hematuria. Pt reported choking while eating bfast, began coughing and threw up. Followed by GI for dilations d/t hx of strictures. Pt began +vomiting BRB repeatedly and xfer to [facility] ICU for suspected UGIB. CTA w/o contrast 3/14 showing constipation, impacted food bolus. S/p NSTEMI w/ EF EF drop from 55% 3/13 to 25-30% 3/16. S/p EGD revealing mid-esophageal ulcer 3/14. S/p 2 units pRBC 3/15. Pt required increased 02 requirements with concerns for possible aspiration pna. Xfer out of ICU to 4W 3/19. Continues on 3-4L NC. Initially NPO on admit switching to CL diet 3/15 and advanced to FL diet 3/16. Briefly switched back to NPO while on BiPAP. Once BiPAP d/c, CL diet reinitiated and then advanced to Regular. Downgraded to ETC, GI bland per SLP 3/18. Per I/O, variable PO intakes while IP, 1-25% x3 documented meals, 26-50% x4 documented meals, % x2 documented meals. Per I/O, consumed almost all of bfast this AM but 0% at lunch. (3/26) Pt trxf back to ICU 3/24 d/t worsening hypoxia, need for biPAP. Has had continued variable intakes avg ~50%. Pt reports trouble eating sausage on 
Comprehensive Nutrition Assessment    Type and Reason for Visit:  Reassess (Interim)    Nutrition Recommendations/Plan:   Continue ONS: HCHP BID  Encourage intakes >50%; update diet preferences as able  Monitor weight, intakes, fluid status, labs, and GI status/bowel fxn     Malnutrition Assessment:  Malnutrition Status:  At risk for malnutrition (03/26/25 1443)    Context:  Acute Illness       Nutrition Assessment:    Initially admitted to [facility] acute cystitis w/o hematuria. Pt reported choking while eating bfast, began coughing and threw up. Followed by GI for dilations d/t hx of strictures. Pt began +vomiting BRB repeatedly and xfer to [facility] ICU for suspected UGIB. CTA w/o contrast 3/14 showing constipation, impacted food bolus. S/p NSTEMI w/ EF EF drop from 55% 3/13 to 25-30% 3/16. S/p EGD revealing mid-esophageal ulcer 3/14. S/p 2 units pRBC 3/15. Pt required increased 02 requirements with concerns for possible aspiration pna. Xfer out of ICU to 4W 3/19. Continues on 3-4L NC. Initially NPO on admit switching to CL diet 3/15 and advanced to FL diet 3/16. Briefly switched back to NPO while on BiPAP. Once BiPAP d/c, CL diet reinitiated and then advanced to Regular. Downgraded to ETC, GI bland per SLP 3/18. Per I/O, variable PO intakes while IP, 1-25% x3 documented meals, 26-50% x4 documented meals, % x2 documented meals. Per I/O, consumed almost all of bfast this AM but 0% at lunch. (3/26) Pt trxf back to ICU 3/24 d/t worsening hypoxia, need for biPAP. Has had continued variable intakes avg ~50%. Pt reports trouble eating sausage on BF tray, requesting grits; will update diet prefs. Weaned to NC and downgraded to medical floor today. Labs and meds reviewed. Cr 3.42, phos 6.0.    Nutrition Related Findings:    NFPE w/o acute findings. Per SLP, pt is edentulous and does not use his dentures to eat. SLP rec'd 3/18 Easy to chew, thin liquids, and GI soft/bland. aspiration and GERD precautions, monitor pt closely for 
Comprehensive Nutrition Assessment    Type and Reason for Visit:  Reassess - Follow Up    Nutrition Recommendations/Plan:   Continue ONS: HCHP BID; monitor for need for low phos ONS - Nepro  Encourage intakes >50%; update diet preferences as able  Monitor and document weight, intakes, fluid status, labs, and GI status/bowel fxn     Malnutrition Assessment:  Malnutrition Status:  At risk for malnutrition (03/26/25 1443)      Nutrition Assessment:    Initially admitted to [facility] acute cystitis w/o hematuria. Pt reported choking while eating bfast, began coughing and threw up. Followed by GI for dilations d/t hx of strictures. Pt began +vomiting BRB repeatedly and xfer to [facility] ICU for suspected UGIB. CTA w/o contrast 3/14 showing constipation, impacted food bolus. S/p NSTEMI w/ EF EF drop from 55% 3/13 to 25-30% 3/16. S/p EGD revealing mid-esophageal ulcer 3/14. S/p 2 units pRBC 3/15. Pt required increased 02 requirements with concerns for possible aspiration pna. Xfer out of ICU to 4W 3/19. Continues on 3-4L NC. Initially NPO on admit switching to CL diet 3/15 and advanced to FL diet 3/16. Briefly switched back to NPO while on BiPAP. Once BiPAP d/c, CL diet reinitiated and then advanced to Regular. Downgraded to ETC, GI bland per SLP 3/18. Per I/O, variable PO intakes while IP, 1-25% x3 documented meals, 26-50% x4 documented meals, % x2 documented meals. Per I/O, consumed almost all of bfast this AM but 0% at lunch. (3/26) Pt trxf back to ICU 3/24 d/t worsening hypoxia, need for biPAP. Has had continued variable intakes avg ~50%. Pt reports trouble eating sausage on BF tray, requesting grits; will update diet prefs. Weaned to NC and downgraded to medical floor today. (3/28) No addl documented intakes since last assessment, will reassess at follow up. Now ETC, low phos. Wt stable. Labs and meds reviewed.    Nutrition Related Findings:    NFPE w/o acute findings. Per SLP, pt is edentulous and does not use his dentures 
Nursing messaged about sinus tachycardia now Atrial Fibrillation.  Asked for vitals and EKG.   /53 and  but repeat . Tele reviewed   He has metoprolol ordered. HR upto 110s okay.   Informed nursing if BP low ie <100/60 and HR continue to be elevated >110, then let provider know so amio drip can be started.    If BP acceptable and HR >110 ongoing, then will consider dilt drip vs lopressor.    He is on ASA. Due to recent GI bleed, will hold off on anticoagulation.       Does complain of some bloating. KUB ordered.         ----Nursing updated , BP 110s/60s, Will give lopressor 5 mg.  Night shift to let provider know if ongoing RVR >110, then may need drip.        
PT attempted this afternoon; however, pt.in Nuclear med. Will see pt. At a later time.  
Patient DCP is to return home with wife and [facility] services. Rolling walker has already been delivered to the room.     Patient on 10 L mid flow oxygen this am, currently on bipap, awaiting transfer to ICU.     Followed by pulmonology, nephrology.   
Patient currently on PT/OT caseload, however patient transferred to ICU from 469 due to medical decline. Pt currently placed on hold and current PT/OT orders will be discontinued at this time. Will need new PT/OT evaluation order once pt medically stable for (re)assessment. Thank you.     
Patient has abdominal distention, worried about not peeing a lot. Bladder scan done, 52ml was seen from the scan. Per telemetry staff, patient went to Afib from sinus tachycardia, NOD called Dr. Beach but no answer. EKG stat done Afib RVR noted per result HR running 106-117bpm, Patient felt he is hot earlier. NOD was able to call Dr. Little (seen EKG result by Dr. Little) informed about the recent concerns including recent V/S.     1913H HR-120bpm IV metoprolol given per Dr. Little, informed telemetry staff. SPO2-88%-90% with SOB at rest, night RT on duty informed via phone call, patient already on 6Lpm humidified oxygen support via nasal cannula. NOD messaged pharmacy for new ordered Mylicon for distention. RT on duty coming to see the patient.    1920H patient's HR dropped to 97bpm. Patient fully alert and oriented. Handover report given to night nurse at bedside.  
Patient lives home with his wife and will return at discharge. Patient accepted by [facility]. SOC 3/22/25.     Patient also recommended by PT to have a rolling walker at discharge. Referral sent to [place].     Per IDRs, patient will likely need another 24-48 hours until ready for discharge.     Barriers to discharge- stable hemoglobin, wean oxygen, currently on 3 LPM, cardio clearance.   
Patient taken off NIV per patient request so that he can have something to drink and take a break from the mask. Placed on HFNC. No noted distress. Change tolerated well. RN and family member at bedside  
Progress Note      3/25/2025 9:14 AM  NAME: Lei Jaimes   MRN:  [id number]   Admit Diagnosis: Upper GI bleed [K92.2]  GIB (gastrointestinal bleeding) [K92.2]          Assessment/Plan:     A-fib with RVR, currently with sinus tachycardia.  Will repeat EKG.  Will not anticoagulate with recent GI bleed.    NSTEMI, continues aspirin and subcu heparin.    Respiratory failure, continues on high flow oxygen.    Acute kidney injury on chronic kidney disease    Anemia/GI bleed/dysphagia/esophageal stricture, stable hemoglobin.    Severe left ventricular systolic dysfunction, without evidence of decompensation.         []       High complexity decision making was performed in this patient at high risk for decompensation with multiple organ involvement.    Subjective:     Lei Jaimes denies chest pain, dyspnea.  Feels he is headed in the right direction.    Discussed with RN events overnight.     Review of Systems:    Symptom Y/N Comments  Symptom Y/N Comments   Fever/Chills N   Chest Pain N    Poor Appetite N   Edema N    Cough N   Abdominal Pain N    Sputum N   Joint Pain N    SOB/VELAZQUEZ N   Pruritis/Rash N    Nausea/vomit N   Tolerating PT/OT Y    Diarrhea N   Tolerating Diet Y    Constipation N   Other       Could NOT obtain due to:      Objective:      Physical Exam:    Last 24hrs VS reviewed since prior progress note. Most recent are:    BP 97/70   Pulse 92   Temp 98.2 °F (36.8 °C) (Oral)   Resp 27   Ht 1.803 m (5' 10.98\")   Wt 75 kg (165 lb 5.5 oz)   SpO2 100%   BMI 23.07 kg/m²     Intake/Output Summary (Last 24 hours) at 3/25/2025 0914  Last data filed at 3/25/2025 0600  Gross per 24 hour   Intake --   Output 550 ml   Net -550 ml        General Appearance: Well developed, well nourished, alert; no acute distress.  Ears/Nose/Mouth/Throat: Hearing grossly normal; moist mucous membranes  Neck: Supple.  Chest: Lungs clear to auscultation bilaterally.  Cardiovascular: Tachycardic, S1S2 normal, no murmur.  Abdomen: 
Progress Note      3/28/2025 12:47 PM  NAME: Lei Jaimes   MRN:  [id number]   Admit Diagnosis: Upper GI bleed [K92.2]  GIB (gastrointestinal bleeding) [K92.2]          Assessment/Plan:     A-fib with RVR, currently in sinus rhythm.    NSTEMI, continues aspirin and subcu heparin.    Respiratory failure, improved, off oxygen.    Acute kidney injury on chronic kidney disease, downtrending creatinine.    Anemia/GI bleed/dysphagia/esophageal stricture, stable hemoglobin.    Severe left ventricular systolic dysfunction, without evidence of decompensation.         []       High complexity decision making was performed in this patient at high risk for decompensation with multiple organ involvement.    Subjective:     Lei Jaimes denies chest pain, dyspnea.    Review of Systems:    Symptom Y/N Comments  Symptom Y/N Comments   Fever/Chills N   Chest Pain N    Poor Appetite N   Edema N    Cough N   Abdominal Pain N    Sputum N   Joint Pain N    SOB/VELAZQUEZ N   Pruritis/Rash N    Nausea/vomit N   Tolerating PT/OT Y    Diarrhea N   Tolerating Diet Y    Constipation N   Other       Could NOT obtain due to:      Objective:      Physical Exam:    Last 24hrs VS reviewed since prior progress note. Most recent are:    BP (!) 127/47   Pulse 86   Temp 97.6 °F (36.4 °C) (Oral)   Resp 25   Ht 1.803 m (5' 10.98\")   Wt 75 kg (165 lb 5.5 oz)   SpO2 93%   BMI 23.07 kg/m²     Intake/Output Summary (Last 24 hours) at 3/28/2025 1247  Last data filed at 3/28/2025 0914  Gross per 24 hour   Intake 909.75 ml   Output 1450 ml   Net -540.25 ml        General Appearance: Well developed, well nourished, alert; no acute distress.  Ears/Nose/Mouth/Throat: Hearing grossly normal; moist mucous membranes  Neck: Supple.  Chest: Lungs clear to auscultation bilaterally.  Cardiovascular: Tachycardic, S1S2 normal, no murmur.  Abdomen: Soft, non-tender, bowel sounds are active.  Extremities: No edema bilaterally.  Skin: Warm and dry.    []         Post-cath 
Progress Note      3/29/2025 9:57 AM  NAME: Lei Jaimes   MRN:  [id number]   Admit Diagnosis: Upper GI bleed [K92.2]  GIB (gastrointestinal bleeding) [K92.2]          Assessment/Plan:     A-fib with RVR, currently in sinus rhythm.    NSTEMI, continues aspirin and subcu heparin.    Respiratory failure, improved, off oxygen.    Acute kidney injury on chronic kidney disease, downtrending creatinine.    Anemia/GI bleed/dysphagia/esophageal stricture, stable hemoglobin.    Severe left ventricular systolic dysfunction, without evidence of decompensation.         []       High complexity decision making was performed in this patient at high risk for decompensation with multiple organ involvement.    Subjective:     Lei Jaimes denies chest pain, dyspnea.  Feeling fatigued today.  Says he does not feel like getting out of bed today as he feels with his respiratory treatment, eating breakfast and incentive spirometry.  Review of Systems:    Symptom Y/N Comments  Symptom Y/N Comments   Fever/Chills N   Chest Pain N    Poor Appetite N   Edema N    Cough N   Abdominal Pain N    Sputum N   Joint Pain N    SOB/VELAZQUEZ N   Pruritis/Rash N    Nausea/vomit N   Tolerating PT/OT Y    Diarrhea N   Tolerating Diet Y    Constipation N   Other       Could NOT obtain due to:      Objective:      Physical Exam:    Last 24hrs VS reviewed since prior progress note. Most recent are:    /64   Pulse (!) 105   Temp 98.1 °F (36.7 °C) (Oral)   Resp 16   Ht 1.803 m (5' 10.98\")   Wt 75 kg (165 lb 5.5 oz)   SpO2 94%   BMI 23.07 kg/m²     Intake/Output Summary (Last 24 hours) at 3/29/2025 0957  Last data filed at 3/29/2025 0659  Gross per 24 hour   Intake 938.63 ml   Output 1850 ml   Net -911.37 ml        General Appearance: Well developed, well nourished, alert; no acute distress.  Ears/Nose/Mouth/Throat: Hearing grossly normal; moist mucous membranes  Neck: Supple.  Chest: Lungs clear to auscultation bilaterally.  Cardiovascular: Regular 
Progress Note      3/30/2025 10:12 AM  NAME: Lei Jaimes   MRN:  [id number]   Admit Diagnosis: Upper GI bleed [K92.2]  GIB (gastrointestinal bleeding) [K92.2]          Assessment/Plan:     A-fib with RVR, currently with sinus tachycardia.    NSTEMI, continues aspirin and subcu heparin.    Respiratory failure, yesterday was off oxygen, today [place] on oxygen.    Acute kidney injury on chronic kidney disease, downtrending creatinine.    Anemia/GI bleed/dysphagia/esophageal stricture, stable hemoglobin.    Severe left ventricular systolic dysfunction, without evidence of decompensation.         []       High complexity decision making was performed in this patient at high risk for decompensation with multiple organ involvement.    Subjective:     Lei Jaimes denies chest pain.  Reports breathing has been on and off.  Review of Systems:    Symptom Y/N Comments  Symptom Y/N Comments   Fever/Chills N   Chest Pain N    Poor Appetite N   Edema N    Cough N   Abdominal Pain N    Sputum N   Joint Pain N    SOB/VELAZQUEZ N   Pruritis/Rash N    Nausea/vomit N   Tolerating PT/OT Y    Diarrhea N   Tolerating Diet Y    Constipation N   Other       Could NOT obtain due to:      Objective:      Physical Exam:    Last 24hrs VS reviewed since prior progress note. Most recent are:    /63   Pulse (!) 104   Temp 97.3 °F (36.3 °C) (Oral)   Resp 20   Ht 1.803 m (5' 10.98\")   Wt 69.8 kg (153 lb 14.1 oz)   SpO2 94%   BMI 21.47 kg/m²     Intake/Output Summary (Last 24 hours) at 3/30/2025 1012  Last data filed at 3/30/2025 0634  Gross per 24 hour   Intake 526.76 ml   Output 1100 ml   Net -573.24 ml        General Appearance: Well developed, well nourished, alert; no acute distress.  Ears/Nose/Mouth/Throat: Hearing grossly normal; moist mucous membranes  Neck: Supple.  Chest: Lungs clear to auscultation bilaterally.  Cardiovascular: Regular rhythm, S1S2 normal, no murmur.  Abdomen: Soft, non-tender, bowel sounds are 
Progress Note      4/1/2025 11:35 AM  NAME: Lei Jaimes   MRN:  [id number]   Admit Diagnosis: Upper GI bleed [K92.2]  GIB (gastrointestinal bleeding) [K92.2]          Assessment/Plan:   Worsening pleural effusion, for thoracentesis.  Hopefully will help his shortness of breath.      A-fib with RVR, currently with sinus tachycardia.    NSTEMI, continues aspirin and subcu heparin.    Respiratory failure, yesterday was off oxygen, today [place] on oxygen.    Acute kidney injury on chronic kidney disease, downtrending creatinine.    Anemia/GI bleed/dysphagia/esophageal stricture, stable hemoglobin.    Severe left ventricular systolic dysfunction, without evidence of decompensation.         []       High complexity decision making was performed in this patient at high risk for decompensation with multiple organ involvement.    Subjective:     Lei Jaimes denies chest pain.  Feeling more short of breath.  Review of Systems:    Symptom Y/N Comments  Symptom Y/N Comments   Fever/Chills N   Chest Pain N    Poor Appetite N   Edema N    Cough N   Abdominal Pain N    Sputum N   Joint Pain N    SOB/VELAZQUEZ y   Pruritis/Rash N    Nausea/vomit N   Tolerating PT/OT Y    Diarrhea N   Tolerating Diet Y    Constipation N   Other       Could NOT obtain due to:      Objective:      Physical Exam:    Last 24hrs VS reviewed since prior progress note. Most recent are:    /65   Pulse (!) 105   Temp 97.7 °F (36.5 °C) (Oral)   Resp 19   Ht 1.803 m (5' 10.98\")   Wt 69.8 kg (153 lb 14.1 oz)   SpO2 93%   BMI 21.47 kg/m²     Intake/Output Summary (Last 24 hours) at 4/1/2025 1135  Last data filed at 4/1/2025 0637  Gross per 24 hour   Intake 275 ml   Output 600 ml   Net -325 ml        General Appearance: Well developed, well nourished, alert; no acute distress.  Ears/Nose/Mouth/Throat: Hearing grossly normal; moist mucous membranes  Neck: Supple.  Chest: Lungs clear to auscultation bilaterally.  Cardiovascular: Regular rhythm, S1S2 
Progress Note  Date:2025       Room:[facility]  Patient Name:Lei Jaimes     YOB: 1945     Age:79 y.o.        Subjective    Subjective   Patient followed for worsening pneumonia with persistent leukocytosis, however, CXR has been improving.  No other source of infection with normal urinalysis. Currently on Meropenem.  Procal and CRP increasing today. He is not on systemic steroids but he is on Budesonide.   VQ scan yesterday was low probability.   He remains on nasal O2 cannula.   Objective         Vitals Last 24 Hours:  TEMPERATURE:  Temp  Av.2 °F (36.8 °C)  Min: 97.5 °F (36.4 °C)  Max: 98.8 °F (37.1 °C)  RESPIRATIONS RANGE: Resp  Av.6  Min: 16  Max: 18  PULSE OXIMETRY RANGE: SpO2  Av.8 %  Min: 93 %  Max: 97 %  PULSE RANGE: Pulse  Av.7  Min: 90  Max: 108  BLOOD PRESSURE RANGE: Systolic (24hrs), Av , Min:116 , Max:125   ; Diastolic (24hrs), Av, Min:50, Max:69       Vitals and nursing note reviewed.   Constitutional:       Appearance: He is not ill-appearing.      Comments:   Nasal O2 cannula 3L/min, slight desaturation when O2 turned off  HENT:      Head: Normocephalic and atraumatic.      Right Ear: External ear normal.      Left Ear: External ear normal.      Nose: Nose normal.      Mouth/Throat:      Pharynx: Oropharynx is clear.   Eyes:      Extraocular Movements: Extraocular movements intact.      Pupils: Pupils are equal, round, and reactive to light.   Cardiovascular:      Rate and Rhythm: Normal rate and regular rhythm.      Heart sounds: Normal heart sounds. No murmur heard.  Pulmonary: clear bilaterally     Effort: Pulmonary effort is normal.      Breath sounds: Normal breath sounds.   Abdominal:      General: Bowel sounds are normal. There is no distension.      Palpations: Abdomen is soft.      Tenderness: There is no abdominal tenderness.   Genitourinary:     Comments: External urinary device  Musculoskeletal:      Cervical back: Neck supple.   Skin: Sacral 
Progress Note  Date:2025       Room:[facility]  Patient Name:Lei Jaimes     YOB: 1945     Age:79 y.o.        Subjective    Subjective   Patient followed for worsening pneumonia with persistent leukocytosis, however, CXR has been improving.  No other source of infection with normal urinalysis. Currently on Meropenem.  Procal and CRP increasing today. He is not on systemic steroids but he is on Budesonide.   VQ scan earlier today was low probability.   He is still having significant SOB an on Nasal O2.  Objective         Vitals Last 24 Hours:  TEMPERATURE:  Temp  Av.1 °F (36.7 °C)  Min: 97.5 °F (36.4 °C)  Max: 99 °F (37.2 °C)  RESPIRATIONS RANGE: Resp  Av.2  Min: 16  Max: 28  PULSE OXIMETRY RANGE: SpO2  Av.4 %  Min: 92 %  Max: 97 %  PULSE RANGE: Pulse  Av.5  Min: 95  Max: 108  BLOOD PRESSURE RANGE: Systolic (24hrs), Av , Min:123 , Max:138   ; Diastolic (24hrs), Av, Min:61, Max:69      Vitals and nursing note reviewed.   Constitutional:       Appearance: He is not ill-appearing.      Comments:   Nasal O2 cannula   HENT:      Head: Normocephalic and atraumatic.      Right Ear: External ear normal.      Left Ear: External ear normal.      Nose: Nose normal.      Mouth/Throat:      Pharynx: Oropharynx is clear.   Eyes:      Extraocular Movements: Extraocular movements intact.      Pupils: Pupils are equal, round, and reactive to light.   Cardiovascular:      Rate and Rhythm: Normal rate and regular rhythm.      Heart sounds: Normal heart sounds. No murmur heard.  Pulmonary: clear bilaterally     Effort: Pulmonary effort is normal.      Breath sounds: Normal breath sounds.   Abdominal:      General: Bowel sounds are normal. There is no distension.      Palpations: Abdomen is soft.      Tenderness: There is no abdominal tenderness.   Genitourinary:     Comments: External urinary device  Musculoskeletal:      Cervical back: Neck supple.   Skin: Coccygeal wound     Media 
Progress Note  Date:3/29/2025       Room:[facility]  Patient Name:Lei Jaimes     YOB: 1945     Age:79 y.o.        Subjective    Subjective   Patient followed for worsening pneumonia with persistent leukocytosis, however, CXR has been improving.  No other source of infection with normal urinalysis. Currently on Meropenem.  Procal has been decreasing.  He is not on systemic steroids but he is on Budesonide.  Patient resting comfortably but he is on nasal O2 cannula today.  He was transferred out of the ICU.     Objective         Vitals Last 24 Hours:  TEMPERATURE:  Temp  Av.2 °F (36.8 °C)  Min: 97.5 °F (36.4 °C)  Max: 98.4 °F (36.9 °C)  RESPIRATIONS RANGE: Resp  Av.8  Min: 16  Max: 38  PULSE OXIMETRY RANGE: SpO2  Av.1 %  Min: 91 %  Max: 98 %  PULSE RANGE: Pulse  Av.9  Min: 94  Max: 105  BLOOD PRESSURE RANGE: Systolic (24hrs), Av , Min:114 , Max:154   ; Diastolic (24hrs), Av, Min:49, Max:70     Vitals and nursing note reviewed.   Constitutional:       Appearance: He is not ill-appearing.      Comments:   Nasal O2 cannula   HENT:      Head: Normocephalic and atraumatic.      Right Ear: External ear normal.      Left Ear: External ear normal.      Nose: Nose normal.      Mouth/Throat:      Pharynx: Oropharynx is clear.   Eyes:      Extraocular Movements: Extraocular movements intact.      Pupils: Pupils are equal, round, and reactive to light.   Cardiovascular:      Rate and Rhythm: Normal rate and regular rhythm.      Heart sounds: Normal heart sounds. No murmur heard.  Pulmonary:      Effort: Pulmonary effort is normal.      Breath sounds: Normal breath sounds.   Abdominal:      General: Bowel sounds are normal. There is no distension.      Palpations: Abdomen is soft.      Tenderness: There is no abdominal tenderness.   Genitourinary:     Comments: External urinary device  Musculoskeletal:      Cervical back: Neck supple.   Skin:     Findings: No rash.   Neurological:      
Progress Note  Date:3/30/2025       Room:[facility]  Patient Name:Lei Jaimes     YOB: 1945     Age:79 y.o.        Subjective    Subjective   Patient followed for worsening pneumonia with persistent leukocytosis, however, CXR has been improving.  No other source of infection with normal urinalysis. Currently on Meropenem.  Procal and CRP increasing today. He is not on systemic steroids but he is on Budesonide.     Objective         Vitals Last 24 Hours:  TEMPERATURE:  Temp  Av °F (36.7 °C)  Min: 97.3 °F (36.3 °C)  Max: 98.8 °F (37.1 °C)  RESPIRATIONS RANGE: Resp  Av  Min: 16  Max: 20  PULSE OXIMETRY RANGE: SpO2  Av.4 %  Min: 80 %  Max: 97 %  PULSE RANGE: Pulse  Av.8  Min: 89  Max: 108  BLOOD PRESSURE RANGE: Systolic (24hrs), Av , Min:114 , Max:134   ; Diastolic (24hrs), Av, Min:61, Max:63     Vitals and nursing note reviewed.   Constitutional:       Appearance: He is not ill-appearing.      Comments:   Nasal O2 cannula   HENT:      Head: Normocephalic and atraumatic.      Right Ear: External ear normal.      Left Ear: External ear normal.      Nose: Nose normal.      Mouth/Throat:      Pharynx: Oropharynx is clear.   Eyes:      Extraocular Movements: Extraocular movements intact.      Pupils: Pupils are equal, round, and reactive to light.   Cardiovascular:      Rate and Rhythm: Normal rate and regular rhythm.      Heart sounds: Normal heart sounds. No murmur heard.  Pulmonary:      Effort: Pulmonary effort is normal.      Breath sounds: Normal breath sounds.   Abdominal:      General: Bowel sounds are normal. There is no distension.      Palpations: Abdomen is soft.      Tenderness: There is no abdominal tenderness.   Genitourinary:     Comments: External urinary device  Musculoskeletal:      Cervical back: Neck supple.   Skin:     Findings: No rash.   Neurological:      General: No focal deficit present.      Mental Status: He is alert and oriented to person, place, and 
Progress Note  Date:3/31/2025       Room:[facility]  Patient Name:Lei Jaimes     YOB: 1945     Age:79 y.o.        Subjective    Subjective   Patient followed for worsening pneumonia with persistent leukocytosis, however, CXR has been improving.  No other source of infection with normal urinalysis. Currently on Meropenem.  Procal and CRP increasing today. He is not on systemic steroids but he is on Budesonide.   VQ scan earlier today was low probability.   He is still having significant SOB an on Nasal O2.  Objective         Vitals Last 24 Hours:  TEMPERATURE:  Temp  Av.5 °F (36.9 °C)  Min: 97.3 °F (36.3 °C)  Max: 99.9 °F (37.7 °C)  RESPIRATIONS RANGE: Resp  Av.4  Min: 15  Max: 18  PULSE OXIMETRY RANGE: SpO2  Av.6 %  Min: 88 %  Max: 96 %  PULSE RANGE: Pulse  Av.3  Min: 97  Max: 108  BLOOD PRESSURE RANGE: Systolic (24hrs), Av , Min:114 , Max:139   ; Diastolic (24hrs), Av, Min:61, Max:70     Vitals and nursing note reviewed.   Constitutional:       Appearance: He is not ill-appearing.      Comments:   Nasal O2 cannula   HENT:      Head: Normocephalic and atraumatic.      Right Ear: External ear normal.      Left Ear: External ear normal.      Nose: Nose normal.      Mouth/Throat:      Pharynx: Oropharynx is clear.   Eyes:      Extraocular Movements: Extraocular movements intact.      Pupils: Pupils are equal, round, and reactive to light.   Cardiovascular:      Rate and Rhythm: Normal rate and regular rhythm.      Heart sounds: Normal heart sounds. No murmur heard.  Pulmonary: clear bilaterally     Effort: Pulmonary effort is normal.      Breath sounds: Normal breath sounds.   Abdominal:      General: Bowel sounds are normal. There is no distension.      Palpations: Abdomen is soft.      Tenderness: There is no abdominal tenderness.   Genitourinary:     Comments: External urinary device  Musculoskeletal:      Cervical back: Neck supple.   Skin:     Findings: No rash. 
Progress Note  Date:4/3/2025       Room:[facility]  Patient Name:Lei Jaimes     YOB: 1945     Age:79 y.o.        Subjective    Subjective   Patient followed for worsening pneumonia with persistent leukocytosis, however, CXR has been improving.  No other source of infection with normal urinalysis. Currently on Meropenem.  Procal and CRP increasing today. He is not on systemic steroids but he is on Budesonide.   VQ scan yesterday was low probability.   He remains on nasal O2 cannula.  He underwent right thoracentesis yesterday.  Objective         Vitals Last 24 Hours:  TEMPERATURE:  Temp  Av.1 °F (36.7 °C)  Min: 97.9 °F (36.6 °C)  Max: 98.4 °F (36.9 °C)  RESPIRATIONS RANGE: Resp  Av  Min: 18  Max: 18  PULSE OXIMETRY RANGE: SpO2  Av.9 %  Min: 90 %  Max: 100 %  PULSE RANGE: Pulse  Av.1  Min: 89  Max: 100  BLOOD PRESSURE RANGE: Systolic (24hrs), Av , Min:115 , Max:133   ; Diastolic (24hrs), Av, Min:56, Max:65        Vitals and nursing note reviewed.   Constitutional:       Appearance: He is not ill-appearing.      Comments:   Nasal O2 cannula 3L/min, slight desaturation when O2 turned off  HENT:      Head: Normocephalic and atraumatic.      Right Ear: External ear normal.      Left Ear: External ear normal.      Nose: Nose normal.      Mouth/Throat:      Pharynx: Oropharynx is clear.   Eyes:      Extraocular Movements: Extraocular movements intact.      Pupils: Pupils are equal, round, and reactive to light.   Cardiovascular:      Rate and Rhythm: Normal rate and regular rhythm.      Heart sounds: Normal heart sounds. No murmur heard.  Pulmonary: clear bilaterally     Effort: Pulmonary effort is normal.      Breath sounds: Normal breath sounds.   Abdominal:      General: Bowel sounds are normal. There is no distension.      Palpations: Abdomen is soft.      Tenderness: There is no abdominal tenderness.   Genitourinary:     Comments: External urinary device  Musculoskeletal:      
Pt taken to [email or web address] for VQ test in a stable condition  
Pt taken to IR for thoracentesis, pt in a stable condition.  
Received Order for Telemetry     Lei Jaimes   1945   [id number]   Upper GI bleed [K92.2]  GIB (gastrointestinal bleeding) [K92.2]   Yenifer Lopez MD     Tele Box # [id number] placed on patient at  2127 pm  ER Room # CVICU 288  Admitting to Room 209  Transferring Nurse SHASHA  Verified with Primary Nurse LASHA at  2140 pm   
Received Order for Telemetry     Lei Jaimes   1945   [id number]   Upper GI bleed [K92.2]  GIB (gastrointestinal bleeding) [K92.2]   Bebeto Hendricks MD     Tele Box # [id number] placed on patient at  2140 pm  ER Room #   Admitting to Room 469  Transferring Nurse ARIELLA  Verified with Primary Nurse STACI at  2215 pm   
Renal Progress Note    Patient: Lei Jaimes MRN: [id number]  SSN: [id number]    YOB: 1945  Age: 79 y.o.  Sex: male      Admit Date: 3/13/2025    LOS: 19 days     Subjective:   Patient seen in the room with spouse at bedside.  He is awake, alert. On 6 L NC. Overnight evenys noted. Lasix 80 mg given. Had thoracentesis with 2 L Fluid drained this am. Reports Breathing to be better.  Creatinine increased to 2.6    Current Facility-Administered Medications   Medication Dose Route Frequency    furosemide (LASIX) injection 40 mg  40 mg IntraVENous BID    ipratropium 0.5 mg-albuterol 2.5 mg (DUONEB) nebulizer solution 1 Dose  1 Dose Inhalation BID RT    hydrOXYzine pamoate (VISTARIL) capsule 25 mg  25 mg Oral TID PRN    meropenem (MERREM) 1,000 mg in sodium chloride 0.9 % 100 mL IVPB (addEASE)  1,000 mg IntraVENous Q12H    melatonin tablet 5 mg  5 mg Oral Nightly PRN    pantoprazole (PROTONIX) tablet 40 mg  40 mg Oral QAM AC    heparin (porcine) injection 5,000 Units  5,000 Units SubCUTAneous 3 times per day    simethicone (MYLICON) 40 MG/0.6ML suspension drops 40 mg  40 mg Oral Q6H PRN    metoprolol (LOPRESSOR) injection 5 mg  5 mg IntraVENous Q6H PRN    LORazepam (ATIVAN) tablet 1 mg  1 mg Oral Q4H PRN    calcitRIOL (ROCALTROL) capsule 0.25 mcg  0.25 mcg Oral Daily    vitamin D (ERGOCALCIFEROL) capsule 50,000 Units  50,000 Units Oral Weekly    budesonide (PULMICORT) nebulizer suspension 500 mcg  0.5 mg Nebulization BID RT    aspirin EC tablet 81 mg  81 mg Oral Daily    epoetin lev-epbx (RETACRIT) injection 10,000 Units  10,000 Units SubCUTAneous Weekly    atorvastatin (LIPITOR) tablet 80 mg  80 mg Oral Nightly    metoprolol tartrate (LOPRESSOR) tablet 25 mg  25 mg Oral BID    sodium chloride flush 0.9 % injection 5-40 mL  5-40 mL IntraVENous 2 times per day    sodium chloride flush 0.9 % injection 5-40 mL  5-40 mL IntraVENous PRN    0.9 % sodium chloride infusion   IntraVENous PRN    ondansetron 
Renal Progress Note    Patient: Lei Jaimes MRN: [id number]  SSN: [id number]    YOB: 1945  Age: 79 y.o.  Sex: male      Admit Date: 3/13/2025    LOS: 12 days     Subjective:   Patient seen in the ICU.  Son at bedside. he was transferred yesterday to the ICU for shortness of breath and hypoxia needing BiPAP.  Chest x-ray showed pulmonary edema.  He underwent bilateral thoracentesis with improvement in FiO2 needs  alert and awake, tachycardic with mild dyspnea while talking, on 10 L nasal cannula at time of visit.   received 80 mg of IV Lasix.  No complaints of any swelling in lower extremities.   Only 550 cc urine output documented since this morning so far  Creatinine increasing to 3.5... Slightly improved to 3.4 today.  Electrolytes stable.  Blood pressure noted to be in the mid 90s range this morning    Current Facility-Administered Medications   Medication Dose Route Frequency    hydrOXYzine (VISTARIL) injection 25 mg  25 mg IntraMUSCular Q6H PRN    heparin (porcine) injection 5,000 Units  5,000 Units SubCUTAneous 3 times per day    furosemide (LASIX) injection 20 mg  20 mg IntraVENous BID    simethicone (MYLICON) 40 MG/0.6ML suspension drops 40 mg  40 mg Oral Q6H PRN    metoprolol (LOPRESSOR) injection 5 mg  5 mg IntraVENous Q6H PRN    LORazepam (ATIVAN) tablet 1 mg  1 mg Oral Q4H PRN    ipratropium 0.5 mg-albuterol 2.5 mg (DUONEB) nebulizer solution 1 Dose  1 Dose Inhalation Q6H RT    calcitRIOL (ROCALTROL) capsule 0.25 mcg  0.25 mcg Oral Daily    vitamin D (ERGOCALCIFEROL) capsule 50,000 Units  50,000 Units Oral Weekly    budesonide (PULMICORT) nebulizer suspension 500 mcg  0.5 mg Nebulization BID RT    aspirin EC tablet 81 mg  81 mg Oral Daily    epoetin lev-epbx (RETACRIT) injection 10,000 Units  10,000 Units SubCUTAneous Weekly    piperacillin-tazobactam (ZOSYN) 3,375 mg in sodium chloride 0.9 % 50 mL IVPB (addEASE)  3,375 mg IntraVENous q8h    atorvastatin (LIPITOR) tablet 80 mg  80 mg 
Renal Progress Note    Patient: Lei Jaimes MRN: [id number]  SSN: [id number]    YOB: 1945  Age: 79 y.o.  Sex: male      Admit Date: 3/13/2025    LOS: 9 days     Subjective:   Patient seen at bedside. Alert and awake, no acute distress.   Patient is feeling better, shortness of breath has improved but still having exertional shortness of breath and some abdominal discomfort  No complaints of any swelling in lower extremities.   Repeat labs showed a potassium of 3.4, creatinine of 2.24 today      Current Facility-Administered Medications   Medication Dose Route Frequency    methylPREDNISolone sodium succ (SOLU-MEDROL) injection 40 mg  40 mg IntraVENous Q8H    ipratropium 0.5 mg-albuterol 2.5 mg (DUONEB) nebulizer solution 1 Dose  1 Dose Inhalation Q6H RT    budesonide-formoterol (SYMBICORT) 160-4.5 MCG/ACT inhaler 2 puff  2 puff Inhalation BID RT    aspirin EC tablet 81 mg  81 mg Oral Daily    azithromycin (ZITHROMAX) tablet 250 mg  250 mg Oral Daily    epoetin lev-epbx (RETACRIT) injection 10,000 Units  10,000 Units SubCUTAneous Weekly    furosemide (LASIX) tablet 20 mg  20 mg Oral Daily    piperacillin-tazobactam (ZOSYN) 3,375 mg in sodium chloride 0.9 % 50 mL IVPB (addEASE)  3,375 mg IntraVENous q8h    atorvastatin (LIPITOR) tablet 80 mg  80 mg Oral Nightly    metoprolol tartrate (LOPRESSOR) tablet 25 mg  25 mg Oral BID    pantoprazole (PROTONIX) tablet 40 mg  40 mg Oral BID AC    sodium chloride flush 0.9 % injection 5-40 mL  5-40 mL IntraVENous 2 times per day    sodium chloride flush 0.9 % injection 5-40 mL  5-40 mL IntraVENous PRN    0.9 % sodium chloride infusion   IntraVENous PRN    ondansetron (ZOFRAN-ODT) disintegrating tablet 4 mg  4 mg Oral Q8H PRN    Or    ondansetron (ZOFRAN) injection 4 mg  4 mg IntraVENous Q6H PRN    polyethylene glycol (GLYCOLAX) packet 17 g  17 g Oral Daily PRN    albuterol (PROVENTIL) nebulizer solution 2.5 mg  2.5 mg Nebulization Q2H PRN    metoprolol (LOPRESSOR) 
Renal Progress Note    Patient: Lei Jaimes MRN: [id number]  SSN: [id number]    YOB: 1945  Age: 79 y.o.  Sex: male      Admit Date: 3/13/2025    LOS: 10 days     Subjective:   Patient seen at bedside. Alert and awake, no acute distress.   Pt is O2 dependent and c/o on and off sob  No complaints of any swelling in lower extremities.   Repeat labs showed a potassium of 3.6, creatinine of 2.65 today      Current Facility-Administered Medications   Medication Dose Route Frequency    0.9% NaCl with KCl 20 mEq infusion   IntraVENous Continuous    ipratropium 0.5 mg-albuterol 2.5 mg (DUONEB) nebulizer solution 1 Dose  1 Dose Inhalation Q6H RT    calcitRIOL (ROCALTROL) capsule 0.25 mcg  0.25 mcg Oral Daily    vitamin D (ERGOCALCIFEROL) capsule 50,000 Units  50,000 Units Oral Weekly    ferric gluconate (FERRLECIT) 125 mg in sodium chloride 0.9 % 100 mL IVPB  125 mg IntraVENous Daily    budesonide (PULMICORT) nebulizer suspension 500 mcg  0.5 mg Nebulization BID RT    aspirin EC tablet 81 mg  81 mg Oral Daily    azithromycin (ZITHROMAX) tablet 250 mg  250 mg Oral Daily    epoetin lev-epbx (RETACRIT) injection 10,000 Units  10,000 Units SubCUTAneous Weekly    [Held by provider] furosemide (LASIX) tablet 20 mg  20 mg Oral Daily    piperacillin-tazobactam (ZOSYN) 3,375 mg in sodium chloride 0.9 % 50 mL IVPB (addEASE)  3,375 mg IntraVENous q8h    atorvastatin (LIPITOR) tablet 80 mg  80 mg Oral Nightly    metoprolol tartrate (LOPRESSOR) tablet 25 mg  25 mg Oral BID    pantoprazole (PROTONIX) tablet 40 mg  40 mg Oral BID AC    sodium chloride flush 0.9 % injection 5-40 mL  5-40 mL IntraVENous 2 times per day    sodium chloride flush 0.9 % injection 5-40 mL  5-40 mL IntraVENous PRN    0.9 % sodium chloride infusion   IntraVENous PRN    ondansetron (ZOFRAN-ODT) disintegrating tablet 4 mg  4 mg Oral Q8H PRN    Or    ondansetron (ZOFRAN) injection 4 mg  4 mg IntraVENous Q6H PRN    polyethylene glycol (GLYCOLAX) packet 
Renal Progress Note    Patient: Lei Jaimes MRN: [id number]  SSN: [id number]    YOB: 1945  Age: 79 y.o.  Sex: male      Admit Date: 3/13/2025    LOS: 11 days     Subjective:   Patient seen at bedside.  Spouse at bedside.  Alert and awake, tachycardic with mild dyspnea while talking, on 10 L nasal cannula at time of visit.   Reported episodes of dyspnea last night.  Chest x-ray revealed pulmonary edema and large pleural effusions for which he received 80 mg of IV Lasix.  No complaints of any swelling in lower extremities.   Only 500 cc urine output documented.  Creatinine increasing to 3.5 today.  Electrolytes stable.    Current Facility-Administered Medications   Medication Dose Route Frequency    furosemide (LASIX) injection 80 mg  80 mg IntraVENous BID    simethicone (MYLICON) 40 MG/0.6ML suspension drops 40 mg  40 mg Oral Q6H PRN    metoprolol (LOPRESSOR) injection 5 mg  5 mg IntraVENous Q6H PRN    LORazepam (ATIVAN) tablet 1 mg  1 mg Oral Q4H PRN    ipratropium 0.5 mg-albuterol 2.5 mg (DUONEB) nebulizer solution 1 Dose  1 Dose Inhalation Q6H RT    calcitRIOL (ROCALTROL) capsule 0.25 mcg  0.25 mcg Oral Daily    vitamin D (ERGOCALCIFEROL) capsule 50,000 Units  50,000 Units Oral Weekly    budesonide (PULMICORT) nebulizer suspension 500 mcg  0.5 mg Nebulization BID RT    aspirin EC tablet 81 mg  81 mg Oral Daily    azithromycin (ZITHROMAX) tablet 250 mg  250 mg Oral Daily    epoetin lev-epbx (RETACRIT) injection 10,000 Units  10,000 Units SubCUTAneous Weekly    [Held by provider] furosemide (LASIX) tablet 20 mg  20 mg Oral Daily    piperacillin-tazobactam (ZOSYN) 3,375 mg in sodium chloride 0.9 % 50 mL IVPB (addEASE)  3,375 mg IntraVENous q8h    atorvastatin (LIPITOR) tablet 80 mg  80 mg Oral Nightly    metoprolol tartrate (LOPRESSOR) tablet 25 mg  25 mg Oral BID    sodium chloride flush 0.9 % injection 5-40 mL  5-40 mL IntraVENous 2 times per day    sodium chloride flush 0.9 % injection 5-40 mL  
Renal Progress Note    Patient: Lei Jaimes MRN: [id number]  SSN: [id number]    YOB: 1945  Age: 79 y.o.  Sex: male      Admit Date: 3/13/2025    LOS: 14 days     Subjective:   Patient seen in the ICU.  No family at bedside.   He reports feeling much better today. Off O2. O2 sats 90%+  Denies any swelling legs  Creatinine is improved to 2.38, BUN 62 today. K is low  UOP 2L      Current Facility-Administered Medications   Medication Dose Route Frequency    potassium chloride (KLOR-CON M) extended release tablet 40 mEq  40 mEq Oral Once    sevelamer (RENVELA) packet 0.8 g  0.8 g Oral TID WC    pantoprazole (PROTONIX) tablet 40 mg  40 mg Oral QAM AC    furosemide (LASIX) injection 40 mg  40 mg IntraVENous Daily    midodrine (PROAMATINE) tablet 10 mg  10 mg Oral TID WC    hydrOXYzine (VISTARIL) injection 25 mg  25 mg IntraMUSCular Q6H PRN    heparin (porcine) injection 5,000 Units  5,000 Units SubCUTAneous 3 times per day    simethicone (MYLICON) 40 MG/0.6ML suspension drops 40 mg  40 mg Oral Q6H PRN    metoprolol (LOPRESSOR) injection 5 mg  5 mg IntraVENous Q6H PRN    LORazepam (ATIVAN) tablet 1 mg  1 mg Oral Q4H PRN    ipratropium 0.5 mg-albuterol 2.5 mg (DUONEB) nebulizer solution 1 Dose  1 Dose Inhalation Q6H RT    calcitRIOL (ROCALTROL) capsule 0.25 mcg  0.25 mcg Oral Daily    vitamin D (ERGOCALCIFEROL) capsule 50,000 Units  50,000 Units Oral Weekly    budesonide (PULMICORT) nebulizer suspension 500 mcg  0.5 mg Nebulization BID RT    aspirin EC tablet 81 mg  81 mg Oral Daily    epoetin lev-epbx (RETACRIT) injection 10,000 Units  10,000 Units SubCUTAneous Weekly    piperacillin-tazobactam (ZOSYN) 3,375 mg in sodium chloride 0.9 % 50 mL IVPB (addEASE)  3,375 mg IntraVENous q8h    atorvastatin (LIPITOR) tablet 80 mg  80 mg Oral Nightly    metoprolol tartrate (LOPRESSOR) tablet 25 mg  25 mg Oral BID    sodium chloride flush 0.9 % injection 5-40 mL  5-40 mL IntraVENous 2 times per day    sodium chloride 
Renal Progress Note    Patient: Lei Jaimes MRN: [id number]  SSN: [id number]    YOB: 1945  Age: 79 y.o.  Sex: male      Admit Date: 3/13/2025    LOS: 13 days     Subjective:   Patient seen in the ICU.  Son at bedside.   He reports feeling much better today  Creatinine is improved to 2.9 today  He had ultrasound-guided bilateral thoracentesis of pleural effusions draining 1700 mL of fluid fluid.  Now on nasal cannula with 1.5 liters oxygen.  O2 sats above 90%      Current Facility-Administered Medications   Medication Dose Route Frequency    sevelamer (RENVELA) packet 0.8 g  0.8 g Oral TID WC    [START ON 3/27/2025] pantoprazole (PROTONIX) tablet 40 mg  40 mg Oral QAM AC    furosemide (LASIX) injection 40 mg  40 mg IntraVENous Daily    midodrine (PROAMATINE) tablet 10 mg  10 mg Oral TID WC    hydrOXYzine (VISTARIL) injection 25 mg  25 mg IntraMUSCular Q6H PRN    heparin (porcine) injection 5,000 Units  5,000 Units SubCUTAneous 3 times per day    simethicone (MYLICON) 40 MG/0.6ML suspension drops 40 mg  40 mg Oral Q6H PRN    metoprolol (LOPRESSOR) injection 5 mg  5 mg IntraVENous Q6H PRN    LORazepam (ATIVAN) tablet 1 mg  1 mg Oral Q4H PRN    ipratropium 0.5 mg-albuterol 2.5 mg (DUONEB) nebulizer solution 1 Dose  1 Dose Inhalation Q6H RT    calcitRIOL (ROCALTROL) capsule 0.25 mcg  0.25 mcg Oral Daily    vitamin D (ERGOCALCIFEROL) capsule 50,000 Units  50,000 Units Oral Weekly    budesonide (PULMICORT) nebulizer suspension 500 mcg  0.5 mg Nebulization BID RT    aspirin EC tablet 81 mg  81 mg Oral Daily    epoetin lev-epbx (RETACRIT) injection 10,000 Units  10,000 Units SubCUTAneous Weekly    piperacillin-tazobactam (ZOSYN) 3,375 mg in sodium chloride 0.9 % 50 mL IVPB (addEASE)  3,375 mg IntraVENous q8h    atorvastatin (LIPITOR) tablet 80 mg  80 mg Oral Nightly    metoprolol tartrate (LOPRESSOR) tablet 25 mg  25 mg Oral BID    sodium chloride flush 0.9 % injection 5-40 mL  5-40 mL IntraVENous 2 times 
Renal Progress Note    Patient: Lei Jaimes MRN: [id number]  SSN: [id number]    YOB: 1945  Age: 79 y.o.  Sex: male      Admit Date: 3/13/2025    LOS: 18 days     Subjective:   Patient seen in the room with spouse at bedside.  He is awake, alert. On 4 L NC.   Creatinine 2.2.    Current Facility-Administered Medications   Medication Dose Route Frequency    ipratropium 0.5 mg-albuterol 2.5 mg (DUONEB) nebulizer solution 1 Dose  1 Dose Inhalation BID RT    hydrOXYzine pamoate (VISTARIL) capsule 25 mg  25 mg Oral TID PRN    furosemide (LASIX) tablet 40 mg  40 mg Oral Daily    meropenem (MERREM) 1,000 mg in sodium chloride 0.9 % 100 mL IVPB (addEASE)  1,000 mg IntraVENous Q12H    [Held by provider] midodrine (PROAMATINE) tablet 5 mg  5 mg Oral TID WC    melatonin tablet 5 mg  5 mg Oral Nightly PRN    pantoprazole (PROTONIX) tablet 40 mg  40 mg Oral QAM AC    heparin (porcine) injection 5,000 Units  5,000 Units SubCUTAneous 3 times per day    simethicone (MYLICON) 40 MG/0.6ML suspension drops 40 mg  40 mg Oral Q6H PRN    metoprolol (LOPRESSOR) injection 5 mg  5 mg IntraVENous Q6H PRN    LORazepam (ATIVAN) tablet 1 mg  1 mg Oral Q4H PRN    calcitRIOL (ROCALTROL) capsule 0.25 mcg  0.25 mcg Oral Daily    vitamin D (ERGOCALCIFEROL) capsule 50,000 Units  50,000 Units Oral Weekly    budesonide (PULMICORT) nebulizer suspension 500 mcg  0.5 mg Nebulization BID RT    aspirin EC tablet 81 mg  81 mg Oral Daily    epoetin lev-epbx (RETACRIT) injection 10,000 Units  10,000 Units SubCUTAneous Weekly    atorvastatin (LIPITOR) tablet 80 mg  80 mg Oral Nightly    metoprolol tartrate (LOPRESSOR) tablet 25 mg  25 mg Oral BID    sodium chloride flush 0.9 % injection 5-40 mL  5-40 mL IntraVENous 2 times per day    sodium chloride flush 0.9 % injection 5-40 mL  5-40 mL IntraVENous PRN    0.9 % sodium chloride infusion   IntraVENous PRN    ondansetron (ZOFRAN-ODT) disintegrating tablet 4 mg  4 mg Oral Q8H PRN    Or    
Renal Progress Note    Patient: Lei Jaimes MRN: [id number]  SSN: [id number]    YOB: 1945  Age: 79 y.o.  Sex: male      Admit Date: 3/13/2025    LOS: 17 days     Subjective:   Patient seen in the room.  Wife at bedside he is looking less short of breath today.  Back on oxygen 3 L.  Creatinine has improved to 2.1 today.  Chest x-rayshowed worsening pneumonia.  On meropenem.  Afebrile but WBC count increased to 18.5--> 13.4 today  Excellent urine output of 2.8 L      Current Facility-Administered Medications   Medication Dose Route Frequency    hydrOXYzine pamoate (VISTARIL) capsule 25 mg  25 mg Oral TID PRN    furosemide (LASIX) tablet 40 mg  40 mg Oral Daily    meropenem (MERREM) 1,000 mg in sodium chloride 0.9 % 100 mL IVPB (addEASE)  1,000 mg IntraVENous Q12H    [Held by provider] midodrine (PROAMATINE) tablet 5 mg  5 mg Oral TID WC    melatonin tablet 5 mg  5 mg Oral Nightly PRN    pantoprazole (PROTONIX) tablet 40 mg  40 mg Oral QAM AC    heparin (porcine) injection 5,000 Units  5,000 Units SubCUTAneous 3 times per day    simethicone (MYLICON) 40 MG/0.6ML suspension drops 40 mg  40 mg Oral Q6H PRN    metoprolol (LOPRESSOR) injection 5 mg  5 mg IntraVENous Q6H PRN    LORazepam (ATIVAN) tablet 1 mg  1 mg Oral Q4H PRN    ipratropium 0.5 mg-albuterol 2.5 mg (DUONEB) nebulizer solution 1 Dose  1 Dose Inhalation Q6H RT    calcitRIOL (ROCALTROL) capsule 0.25 mcg  0.25 mcg Oral Daily    vitamin D (ERGOCALCIFEROL) capsule 50,000 Units  50,000 Units Oral Weekly    budesonide (PULMICORT) nebulizer suspension 500 mcg  0.5 mg Nebulization BID RT    aspirin EC tablet 81 mg  81 mg Oral Daily    epoetin lev-epbx (RETACRIT) injection 10,000 Units  10,000 Units SubCUTAneous Weekly    atorvastatin (LIPITOR) tablet 80 mg  80 mg Oral Nightly    metoprolol tartrate (LOPRESSOR) tablet 25 mg  25 mg Oral BID    sodium chloride flush 0.9 % injection 5-40 mL  5-40 mL IntraVENous 2 times per day    sodium chloride flush 
Respiratory status worsened and now requiring HFNC 50L/FiO2 50%.   Lasix increased to 80 BID.  Placed on BIPAP.   ABG and CXR ordered    Spoke to intensivist and will transfer for higher level of care.  Patient made aware.  Wife made aware in morning this could happen.     Patient in mild distress with sternal retractions, crackles at bases, decreased breath sounds. Speaking in clear sentences. AAOx3.     RT at bedside as well   
Vancomycin Dosing Consult  Lei Jaimes is a 79 y.o. male with Pneumonia (Nosocomial). Pharmacy was consulted by Davida Yen PA-C to dose and monitor vancomycin. Today is day 0.    Antibiotic regimen: Vancomycin + azithromycin + meropenem+ micafungin    Temp (24hrs), Av.3 °F (36.8 °C), Min:97.6 °F (36.4 °C), Max:98.7 °F (37.1 °C)    Recent Labs     25  0615 25  0550 25  0218   WBC 15.0* 15.4* 18.5*   CREATININE 2.92* 2.38* 2.40*   BUN 70* 62* 55*     Est CrCl: 26 mL/min, SARA  Concomitant nephrotoxic drugs: Loop diuretics    Cultures:   3/22 Blood x 2: NGTD x 6 days, final   3/23 Sputum: Heavy Yeast, (apparent Candida albicans/Candida dubliniensis)      MRSA Swab: Not detected 3/13, 3/19, 3/21    Target range: Re-dose for random level <15 mcg/mL    Recent level history:  Date/Time Dose & Interval Measured Level (mcg/mL) Associated AUC/RASHEEDA              Assessment/Plan:   CXR on 3/28 shows worsening infiltrate in RLL.  Sputum culture shows heavy yeast. Patient started on IV Vancomycin, Merrem, and Micafungin.  ID consulted.  LD Vancomycin IV 2000 mg x 1 dose ordered  SARA, will pulse dose for now  Level scheduled for 3/29 @1800  CMP ordered through   Antimicrobial stop date 7 days per consult      
is seen today for follow-up.   He has no complaints at this time.  Does want to go home.  Discussed with him that we will try to wean oxygen and see how he does.  Otherwise, does report smoking for many years.  Denies headache, chest pain/palpitations, shortness of breath, Shel[place] pain, urinary symptoms.      All ROS negative otherwise mentioned above.      Assessment and Plan:    Acute respiratory failure 2/2 to Aspiration PNA, Fluid overload, and possible COPD  -Patient presented due to coughing and vomiting   -CTA chest 3/13: No GI bleed, constipation, dilated fluid-filled esophagus  -CXR 3/21 1: Persistent bilateral pleural effusions and decrease pulmonary edema  -BNP 3884 on admission  -Received diuresis and currently Lasix 20 p.o.  -Continue Zosyn for suspected aspiration pneumonia  -Pulmonology was consulted and patient was started on DuoNebs, steroids, azithromycin  -Wean O2 as tolerated    Aspiration PNA suspected   -Continue Zosyn     Acute on Chronic HFrEF  -Now on PO lasix     COPD?   -Hx of smoking and some wheezing on exam  -Started on Duonebs, Symbicort, Steroids, and Azithromycin       NSTEMI  -Troponin peaked at 32,662 but trended down to 96827  -Echo with no acute ischemia  -Echo 3/16: EF 25 to 30%, global hypokinesis, LV dilation  -Continue home medications: Aspirin, statin, metoprolol tartrate 25  -Per cardiology, continue GDMT    SARA on CKD  -Cr 2.24 with baseline 1.25  -CT abdomen/pelvis with no pathology  -UA pending  -Nephrology consulted and appreciate recommendations      Hypokalemia  -K 3.4, Mg 2.0  -Will order K 40       Dysphagia 2/2 to stricture and ulcer   GIB, mid-esophageal ulcer  Esophageal stricture, s/p multiple dilations previously  -Dr. Parsons consulted and EGD with mid-esophageal ulcer with epi injection     Leukocytosis, unclear etiology  -Repeat CXR with no infection  -UA pending  -Will order Blood Clx  -If any fever or signs of sepsis, will start Vancomycin. MRSA negative 
  Behavioral-Environmental Outcomes: None Identified  Food/Nutrient Intake Outcomes: Diet Advancement/Tolerance, Food and Nutrient Intake, Supplement Intake  Physical Signs/Symptoms Outcomes: Biochemical Data, Chewing or Swallowing, GI Status, Meal Time Behavior, Fluid Status or Edema, Weight    Discharge Planning:    Continue current diet     Angelica Dudley  Contact: ext [phone] or via [employer]     
107 97 - 108 mmol/L    CO2 25 21 - 32 mmol/L    Anion Gap 8 2 - 12 mmol/L    Glucose 133 (H) 65 - 100 mg/dL    BUN 34 (H) 6 - 20 mg/dL    Creatinine 2.14 (H) 0.70 - 1.30 mg/dL    BUN/Creatinine Ratio 16 12 - 20      Est, Glom Filt Rate 31 (L) >60 ml/min/1.73m2    Calcium 8.4 (L) 8.5 - 10.1 mg/dL   Magnesium    Collection Time: 03/21/25  1:15 AM   Result Value Ref Range    Magnesium 1.7 1.6 - 2.4 mg/dL   Phosphorus    Collection Time: 03/21/25  1:15 AM   Result Value Ref Range    Phosphorus 3.2 2.6 - 4.7 mg/dL   CBC with Auto Differential    Collection Time: 03/21/25 12:18 PM   Result Value Ref Range    WBC 20.4 (H) 4.1 - 11.1 K/uL    RBC 2.80 (L) 4.10 - 5.70 M/uL    Hemoglobin 8.7 (L) 12.1 - 17.0 g/dL    Hematocrit 27.3 (L) 36.6 - 50.3 %    MCV 97.5 80.0 - 99.0 FL    MCH 31.1 26.0 - 34.0 PG    MCHC 31.9 30.0 - 36.5 g/dL    RDW 14.2 11.5 - 14.5 %    Platelets 323 150 - 400 K/uL    MPV 11.5 8.9 - 12.9 FL    Nucleated RBCs 0.0 0.0  WBC    nRBC 0.00 0.00 - 0.01 K/uL    Neutrophils % 85.0 (H) 32 - 75 %    Band Neutrophils 1 0 - 6 %    Lymphocytes % 10.0 (L) 12 - 49 %    Monocytes % 4.0 (L) 5 - 13 %    Eosinophils % 0.0 0 - 7 %    Basophils % 0.0 0 - 1 %    Immature Granulocytes % 0.0 %    Neutrophils Absolute 17.54 (H) 1.8 - 8.0 K/UL    Lymphocytes Absolute 2.04 0.8 - 3.5 K/UL    Monocytes Absolute 0.82 0.0 - 1.0 K/UL    Eosinophils Absolute 0.00 0.0 - 0.4 K/UL    Basophils Absolute 0.00 0.0 - 0.1 K/UL    Immature Granulocytes Absolute 0.00 K/UL    Differential Type Manual      RBC Comment Normocytic, Normochromic         XR KNEE LEFT (1-2 VIEWS)   Final Result   No acute bony abnormalities.            Electronically signed by [employer]      XR CHEST PORTABLE   Final Result   Persistent small bilateral pleural effusions. Decreased pulmonary edema.         Electronically signed by [employer]      XR CHEST PORTABLE   Final Result   Congestive failure with interstitial pulmonary edema and bilateral   pleural 
directed the management of the following life and organ supporting interventions that required my frequent assessment to treat or prevent imminent deterioration.    I personally spent 35 minutes of critical care time.  This is time spent at this critically ill patient's bedside actively involved in patient care as well as the coordination of care.  This does not include any procedural time which has been billed separately.    Dr. Skip Vigil  Intensivist  
input(s): \"[id number]\"    Recent Results (from the past 24 hours)   C-Reactive Protein    Collection Time: 04/02/25  7:01 AM   Result Value Ref Range    CRP 3.13 (H) 0.00 - 0.30 mg/dL   CBC with Auto Differential    Collection Time: 04/02/25  7:01 AM   Result Value Ref Range    WBC 12.4 (H) 4.1 - 11.1 K/uL    RBC 3.14 (L) 4.10 - 5.70 M/uL    Hemoglobin 9.8 (L) 12.1 - 17.0 g/dL    Hematocrit 31.7 (L) 36.6 - 50.3 %    .0 (H) 80.0 - 99.0 FL    MCH 31.2 26.0 - 34.0 PG    MCHC 30.9 30.0 - 36.5 g/dL    RDW 16.3 (H) 11.5 - 14.5 %    Platelets 236 150 - 400 K/uL    MPV 12.2 8.9 - 12.9 FL    Nucleated RBCs 0.2 (H) 0.0  WBC    nRBC 0.03 (H) 0.00 - 0.01 K/uL    Neutrophils % 80.1 (H) 32.0 - 75.0 %    Lymphocytes % 6.3 (L) 12.0 - 49.0 %    Monocytes % 11.1 5.0 - 13.0 %    Eosinophils % 1.7 0.0 - 7.0 %    Basophils % 0.2 0.0 - 1.0 %    Immature Granulocytes % 0.6 (H) 0 - 0.5 %    Neutrophils Absolute 9.93 (H) 1.80 - 8.00 K/UL    Lymphocytes Absolute 0.78 (L) 0.80 - 3.50 K/UL    Monocytes Absolute 1.38 (H) 0.00 - 1.00 K/UL    Eosinophils Absolute 0.21 0.00 - 0.40 K/UL    Basophils Absolute 0.03 0.00 - 0.10 K/UL    Immature Granulocytes Absolute 0.07 (H) 0.00 - 0.04 K/UL    Differential Type AUTOMATED     Basic Metabolic Panel    Collection Time: 04/02/25  7:01 AM   Result Value Ref Range    Sodium 138 136 - 145 mmol/L    Potassium 3.8 3.5 - 5.1 mmol/L    Chloride 105 97 - 108 mmol/L    CO2 26 21 - 32 mmol/L    Anion Gap 7 2 - 12 mmol/L    Glucose 122 (H) 65 - 100 mg/dL    BUN 62 (H) 6 - 20 mg/dL    Creatinine 2.24 (H) 0.70 - 1.30 mg/dL    BUN/Creatinine Ratio 28 (H) 12 - 20      Est, Glom Filt Rate 29 (L) >60 ml/min/1.73m2    Calcium 9.4 8.5 - 10.1 mg/dL   Procalcitonin    Collection Time: 04/02/25  7:01 AM   Result Value Ref Range    Procalcitonin 0.46 (H) 0 ng/mL        Assessment and Plan:     1. Acute Kidney Injury on CKD stage IIIa/b:   -Presented with creatinine 1.2 which progressively increased to 1.4, but has 
        Electronically signed by GALINA QUINTANA      XR KNEE LEFT (1-2 VIEWS)   Final Result   No acute bony abnormalities.            Electronically signed by ADAMS Amanda      XR CHEST PORTABLE   Final Result   Persistent small bilateral pleural effusions. Decreased pulmonary edema.         Electronically signed by ADAMS Amanda      XR CHEST PORTABLE   Final Result   Congestive failure with interstitial pulmonary edema and bilateral   pleural effusions with overlying atelectasis that could obscure pneumonic   infiltrate.         Electronically signed by Doroteo Hernandez      XR CHEST PORTABLE   Final Result   Given the differences in positioning no significant change.      Electronically signed by Cristian Rodgers      XR CHEST PORTABLE   Final Result   Congestive failure with interstitial pulmonary edema and left   greater than right pleural effusion and overlying atelectasis.      Electronically signed by Doroteo Hernandez      CTA CHEST ABDOMEN PELVIS W WO CONTRAST   Final Result   No evidence of active gastrointestinal hemorrhage.   Constipation in the rectum.   Dilated fluid-filled esophagus with food stuffs in the distal esophagus.   Correlate for impacted food bolus.   No bowel obstruction.   Severely enlarged prostate gland.         Electronically signed by DEVENDRA GROVE      XR CHEST PORTABLE   Final Result   1. New opacities at the left lung base consistent with small left pleural   effusion and severe left lower lobe atelectasis. Follow-up to resolution is   suggested.         Electronically signed by HARRY FREIRE           Recent Results (from the past 24 hours)   C-Reactive Protein    Collection Time: 04/02/25  7:01 AM   Result Value Ref Range    CRP 3.13 (H) 0.00 - 0.30 mg/dL   CBC with Auto Differential    Collection Time: 04/02/25  7:01 AM   Result Value Ref Range    WBC 12.4 (H) 4.1 - 11.1 K/uL    RBC 3.14 (L) 4.10 - 5.70 M/uL    Hemoglobin 9.8 (L) 12.1 - 17.0 g/dL    Hematocrit 31.7 (L) 36.6 - 50.3 %    
   Albumin/Globulin Ratio 0.6 (L) 1.1 - 2.2     Procalcitonin    Collection Time: 04/03/25  6:10 AM   Result Value Ref Range    Procalcitonin 0.44 (H) 0 ng/mL   Brain Natriuretic Peptide    Collection Time: 04/03/25  6:10 AM   Result Value Ref Range    NT Pro-BNP >35,000 (H) <450 pg/mL   Magnesium    Collection Time: 04/03/25  6:24 AM   Result Value Ref Range    Magnesium 2.2 1.6 - 2.4 mg/dL   Phosphorus    Collection Time: 04/03/25  6:24 AM   Result Value Ref Range    Phosphorus 3.4 2.6 - 4.7 mg/dL        Assessment and Plan:     1. Acute Kidney Injury on CKD stage IIIa/b:   -Presented with creatinine 1.2 which progressively increased to 1.4, but has again increased 1.4-->2.6-->3.5.   -SARA had improved after peaking at 3.5.  Creatinine improved to 3.5 -->3.4-->2.9--> 2.4-->2.2.  -Cr increased again to 2.6, BUN 58. Likely 2/2 increased lasix.   -Creatinine improved to 2.0 today, ok to discharge on Lasix 40 mg p.o. daily  -New SARA was probably prerenal azotemia 2/2 cardiorenal/borderline hypotension/diuresis  -Baseline creatinine is 1.2-1.4  -Urine is bland with 0.3 g of proteinuria  -CT with contrast on admission showed no renal mass or hydronephrosis; no rhabdo  -Will continue to monitor renal functions and adjust management as needed    2.  Renal osteodystrophy:  -Calcium is normal; phosphorus okay. Continue low phos diet  -Intact PTH is elevated at 216, on calcitriol 0.25 mcg once daily  -Vitamin D level < 9, on vitamin D 50,000 units once weekly for 16 weeks    3. Hypertension:   -Blood pressures are controlled  -On metoprolol 25 mg BID with parameters to hold  -Will continue to monitor blood pressures and adjust antihypertensive regimen as needed    4. Hypokalemia: probably secondary to poor intake/diuresis   -Potassium is 3.8.  Magnesium 2.2  -Monitor K and Mg levels    5. Anemia: Probably related to chronic kidney disease/s/p upper GI bleed  -Hb is 9.5 stable  -S/p transfusion for Hb of 6.7 on 3/15  -On 
- 36.5 g/dL    RDW 16.7 (H) 11.5 - 14.5 %    Platelets 400 150 - 400 K/uL    MPV 11.9 8.9 - 12.9 FL    Nucleated RBCs 0.0 0.0  WBC    nRBC 0.00 0.00 - 0.01 K/uL   Comprehensive Metabolic Panel    Collection Time: 03/29/25  7:07 AM   Result Value Ref Range    Sodium 138 136 - 145 mmol/L    Potassium 4.0 3.5 - 5.1 mmol/L    Chloride 105 97 - 108 mmol/L    CO2 28 21 - 32 mmol/L    Anion Gap 5 2 - 12 mmol/L    Glucose 125 (H) 65 - 100 mg/dL    BUN 46 (H) 6 - 20 mg/dL    Creatinine 2.11 (H) 0.70 - 1.30 mg/dL    BUN/Creatinine Ratio 22 (H) 12 - 20      Est, Glom Filt Rate 31 (L) >60 ml/min/1.73m2    Calcium 9.5 8.5 - 10.1 mg/dL    Total Bilirubin 1.0 0.2 - 1.0 mg/dL    AST 41 (H) 15 - 37 U/L    ALT 61 12 - 78 U/L    Alk Phosphatase 56 45 - 117 U/L    Total Protein 7.0 6.4 - 8.2 g/dL    Albumin 2.5 (L) 3.5 - 5.0 g/dL    Globulin 4.5 (H) 2.0 - 4.0 g/dL    Albumin/Globulin Ratio 0.6 (L) 1.1 - 2.2     Procalcitonin    Collection Time: 03/29/25  7:07 AM   Result Value Ref Range    Procalcitonin 0.29 (H) 0 ng/mL   C-Reactive Protein    Collection Time: 03/29/25  7:07 AM   Result Value Ref Range    CRP 5.80 (H) 0.00 - 0.30 mg/dL        Assessment and Plan:     1. Acute Kidney Injury on CKD:   -Presented with creatinine 1.2 which progressively increased to 1.4, but has again increased 1.4--> 2.6-->3.5.   -SARA improving after peaking at 3.5.  Creatinine improved to 3.5 -->3.4-->2.9--> 2.4-->2.1 today BUN 46  -New SARA was probably prerenal azotemia 2/2 cardiorenal/borderline hypotension/diuresis  -Baseline creatinine is 1.2-1.4  -CT with contrast on admission showed no renal mass or hydronephrosis; no rhabdo  -on Lasix 40 mg p.o. every other day which I will change to daily because of worsening shortness of breath.  Excellent urine output of 2.8 L  -Continue to monitor renal functions  -Will continue to monitor renal functions and adjust management as needed    2. Chronic kidney disease: probably has stage IIIa chronic 
0.0 0.0  WBC    nRBC 0.00 0.00 - 0.01 K/uL   Basic Metabolic Panel    Collection Time: 03/20/25  5:21 AM   Result Value Ref Range    Sodium 140 136 - 145 mmol/L    Potassium 3.4 (L) 3.5 - 5.1 mmol/L    Chloride 107 97 - 108 mmol/L    CO2 28 21 - 32 mmol/L    Anion Gap 5 2 - 12 mmol/L    Glucose 109 (H) 65 - 100 mg/dL    BUN 36 (H) 6 - 20 mg/dL    Creatinine 1.93 (H) 0.70 - 1.30 mg/dL    BUN/Creatinine Ratio 19 12 - 20      Est, Glom Filt Rate 35 (L) >60 ml/min/1.73m2    Calcium 8.8 8.5 - 10.1 mg/dL   Magnesium    Collection Time: 03/20/25  5:21 AM   Result Value Ref Range    Magnesium 1.9 1.6 - 2.4 mg/dL   Phosphorus    Collection Time: 03/20/25  5:21 AM   Result Value Ref Range    Phosphorus 3.9 2.6 - 4.7 mg/dL          Cardiac cath:    No results found for this or any previous visit.       Echo:     03/13/25    ECHO (TTE) LIMITED (PRN CONTRAST/BUBBLE/STRAIN/3D) 03/16/2025  5:53 PM (Final)    Interpretation Summary    Left Ventricle: Reduced left ventricular systolic function with a visually estimated EF of 25 - 30%. Left ventricle is dilated. Increased wall thickness. EF BP is 27%. Global hypokinesis present.    Aortic Valve: Trileaflet valve. Mildly thickened cusps. Mild regurgitation.    Image quality is fair. Contrast used: Lumason. Limited Doppler study due to patient's condition and procedure performed with the patient in a supine position.    Signed by: Kamran Hooks on 3/16/2025  5:53 PM       Patient's  EKG, laboratory data and echocardiogram were individually reviewed by me.      Lab Data:    Recent Labs     03/19/25  0450 03/20/25  0521   WBC 13.9* 12.8*   HGB 8.6* 8.7*   HCT 26.7* 26.9*    295     No results for input(s): \"INR\", \"APTT\" in the last 72 hours.    Invalid input(s): \"PTP\"     Recent Labs     03/18/25  0500 03/19/25  0450 03/20/25  0521    140 140   K 3.4* 3.7 3.4*   * 108 107   CO2 27 27 28   BUN 38* 36* 36*   MG 1.9 2.0 1.9     No results for input(s): \"CPK\" in the 
which requires the highest level of preparedness to intervene urgently. I participated in the decision-making and personally managed or directed the management of the following life and organ supporting interventions that required my frequent assessment to treat or prevent imminent deterioration.    I personally spent 45 minutes of critical care time.  This is time spent at this critically ill patient's bedside actively involved in patient care as well as the coordination of care.  This does not include any procedural time which has been billed separately.    Clarisse Hopper MD  Critical Care Medicine  [facility]    
%   03/29/25 0700 -- -- -- (!) 103 -- --   03/29/25 0313 (!) 135/54 97.5 °F (36.4 °C) Oral 97 18 95 %   03/29/25 0006 -- -- -- (!) 103 -- --   03/28/25 2147 137/65 98.4 °F (36.9 °C) Oral (!) 102 18 91 %   03/28/25 2132 -- -- -- 97 -- --   03/28/25 2100 (!) 154/56 -- -- (!) 103 19 95 %   03/28/25 2000 (!) 145/70 -- -- 100 19 93 %   03/28/25 1944 (!) 140/68 98.3 °F (36.8 °C) Oral (!) 102 22 96 %   03/28/25 1900 -- 98.3 °F (36.8 °C) Oral -- -- --   03/28/25 1800 (!) 147/51 -- -- (!) 103 (!) 37 91 %   03/28/25 1700 114/61 -- -- 98 (!) 38 93 %   03/28/25 1625 (!) 135/53 -- -- -- -- --   03/28/25 1600 (!) 135/53 -- -- 98 22 98 %   03/28/25 1500 (!) 135/49 98.4 °F (36.9 °C) Axillary 94 19 95 %   03/28/25 1400 (!) 140/48 -- -- 95 23 95 %   03/28/25 1341 -- -- -- -- -- 95 %   03/28/25 1300 (!) 131/39 -- -- 93 30 96 %   03/28/25 1200 (!) 127/47 -- -- 86 25 93 %   03/28/25 1100 (!) 147/56 97.6 °F (36.4 °C) Oral 95 (!) 34 91 %         Intake/Output Summary (Last 24 hours) at 3/29/2025 1008  Last data filed at 3/29/2025 0659  Gross per 24 hour   Intake 538.63 ml   Output 1850 ml   Net -1311.37 ml        I had a face to face encounter and independently examined this patient on 3/29/2025, as outlined below:    Review of Systems   Constitutional:  Positive for fatigue.   Respiratory:  Positive for cough and shortness of breath.    Cardiovascular: Negative.  Negative for chest pain, palpitations and leg swelling.   Gastrointestinal: Negative.  Negative for abdominal pain, diarrhea, nausea and vomiting.   Genitourinary: Negative.    Musculoskeletal: Negative.    Skin: Negative.    Neurological: Negative.    Psychiatric/Behavioral:  The patient is nervous/anxious.    All other systems reviewed and are negative.       PHYSICAL EXAM:  Physical Exam  Vitals and nursing note reviewed.   Constitutional:       Appearance: Normal appearance.      Interventions: Nasal cannula in place.   Cardiovascular:      Rate and Rhythm: Regular rhythm. 
L removal  -Was on BiPAP but now off O2  -Being transferred to the floor  -empirically with Zosyn for multifocal pneumonia      Signed By: Daniella Sanchez MD     March 28, 2025      
Max:98.2 °F (36.8 °C)        O2 Device: Nasal cannula O2 Flow Rate (L/min): 3 L/min       Wt Readings from Last 4 Encounters:   03/23/25 75.3 kg (166 lb 0.1 oz)   03/12/25 76 kg (167 lb 8 oz)   01/23/25 72.6 kg (160 lb)   04/16/24 77.1 kg (169 lb 14.4 oz)          Intake/Output Summary (Last 24 hours) at 3/23/2025 1100  Last data filed at 3/23/2025 0807  Gross per 24 hour   Intake 2090.36 ml   Output 300 ml   Net 1790.36 ml       Last shift:      03/23 0701 - 03/23 1900  In: 600 [P.O.:600]  Out: -   Last 3 shifts: 03/21 1901 - 03/23 0700  In: 2520.4 [P.O.:2088; I.V.:45.9]  Out: 600 [Urine:600]       Physical Exam:     General: Alert awake in respiratory distress on 3 L nasal cannula  HEENT: NCAT, poor dentition, lips and mucosa dry  Eyes: anicteric; conjunctiva clear  Neck: no nodes, trach midline; no accessory MM use.  Chest: no deformity,   Cardiac: R regular; no murmur;   Lungs: distant breath sounds; diminished breath sound bilaterally coarse breath sound  Abd: soft, NT, hypoactive BS  Ext: no edema; no joint swelling; No clubbing  : NO angela, clear urine  Neuro: No focal neurological left  Psych- no agitation, oriented to person;   Skin: warm, dry, no cyanosis;   Pulses: 1-2+ Bilateral pedal, radial  Capillary: brisk; pale      DATA:  No results found for this or any previous visit.    03/13/25    ECHO (TTE) LIMITED (PRN CONTRAST/BUBBLE/STRAIN/3D) 03/16/2025  5:53 PM (Final)    Interpretation Summary    Left Ventricle: Reduced left ventricular systolic function with a visually estimated EF of 25 - 30%. Left ventricle is dilated. Increased wall thickness. EF BP is 27%. Global hypokinesis present.    Aortic Valve: Trileaflet valve. Mildly thickened cusps. Mild regurgitation.    Image quality is fair. Contrast used: Lumason. Limited Doppler study due to patient's condition and procedure performed with the patient in a supine position.    Signed by: Kamran Hooks on 3/16/2025  5:53 PM       MAR reviewed and 
negative for L. pneumophila serogroup 1 antigen in urine,  suggesting no recent or current infection. Legionnaires' disease  cannot be ruled out since other serogroups and species may also  cause disease.  Performed At: [address] [id number]  Castro Vargas MD Ph:[phone]         MRSA by PCR [[id number]] Collected: 03/1945    Order Status: Canceled Specimen: Swab     Culture, Blood 1 [[id number]] Collected: 03/28/25 1827    Order Status: Completed Specimen: Blood Updated: 03/30/25 1212     Special Requests --        No Special Requests  Right  Antecubital       Culture No growth 2 days       Culture, Blood 2 [[id number]] Collected: 03/28/25 1827    Order Status: Completed Specimen: Blood Updated: 03/30/25 1212     Special Requests No Special Requests        Culture No growth 2 days       MRSA by PCR [[id number]] Collected: 03/28/25 1700    Order Status: Completed Specimen: Swab Updated: 03/28/25 2204     MRSA by PCR Not Detected       Culture, Respiratory [[id number]] Collected: 03/28/25 1615    Order Status: Canceled Specimen: Sputum Expectorated     Culture, C Auris Screen [[id number]] Collected: 03/25/25 1535    Order Status: Completed Specimen: Skin Updated: 03/30/25 1413     Special Requests No Special Requests        Culture No Candida auris present       COVID-19 & Influenza Combo [[id number]] Collected: 03/24/25 1545    Order Status: Completed Specimen: Nasopharyngeal Updated: 03/24/25 1638     SARS-CoV-2, PCR Not Detected        Comment: Not Detected results do not preclude SARS-CoV-2 infection and should not be used as the sole basis for patient management decisions. Results must be combined with clinical observations, patient history, and epidemiological information        Rapid Influenza A By PCR Not Detected        Rapid Influenza B By PCR Not Detected        Comment:    Testing was performed using vijaya Vani SARS-CoV-2 and Influenza A/B nucleic acid 
Cristian Rodgers      XR CHEST PORTABLE   Final Result   Improved bilateral pulmonary infiltrates with persistent pleural   effusions.            Electronically signed by GALINA QUINTANA      XR KNEE LEFT (1-2 VIEWS)   Final Result   No acute bony abnormalities.            Electronically signed by ADAMS Amanda      XR CHEST PORTABLE   Final Result   Persistent small bilateral pleural effusions. Decreased pulmonary edema.         Electronically signed by ADAMS Amanda      XR CHEST PORTABLE   Final Result   Congestive failure with interstitial pulmonary edema and bilateral   pleural effusions with overlying atelectasis that could obscure pneumonic   infiltrate.         Electronically signed by Doroteo Hernandez      XR CHEST PORTABLE   Final Result   Given the differences in positioning no significant change.      Electronically signed by [email or web address]      XR CHEST PORTABLE   Final Result   Congestive failure with interstitial pulmonary edema and left   greater than right pleural effusion and overlying atelectasis.      Electronically signed by Doroteo Hernandez      CTA CHEST ABDOMEN PELVIS W WO CONTRAST   Final Result   No evidence of active gastrointestinal hemorrhage.   Constipation in the rectum.   Dilated fluid-filled esophagus with food stuffs in the distal esophagus.   Correlate for impacted food bolus.   No bowel obstruction.   Severely enlarged prostate gland.         Electronically signed by DEVENDRA GROVE      XR CHEST PORTABLE   Final Result   1. New opacities at the left lung base consistent with small left pleural   effusion and severe left lower lobe atelectasis. Follow-up to resolution is   suggested.         Electronically signed by HARRY FREIRE      XR CHEST PORTABLE    (Results Pending)       XR CHEST PORTABLE  Result Date: 3/21/2025  EXAM: XR CHEST PORTABLE ACC#: [id number]  INDICATION: follow up  COMPARISON: 3/19/2025 TECHNIQUE: Portable AP upright view of the chest. FINDINGS: There are small bilateral 
effusions with overlying atelectasis that could obscure pneumonic   infiltrate.         Electronically signed by Doroteo Hernandez      XR CHEST PORTABLE   Final Result   Given the differences in positioning no significant change.      Electronically signed by Cristian Rodgers      XR CHEST PORTABLE   Final Result   Congestive failure with interstitial pulmonary edema and left   greater than right pleural effusion and overlying atelectasis.      Electronically signed by Doroteo Hernandez      CTA CHEST ABDOMEN PELVIS W WO CONTRAST   Final Result   No evidence of active gastrointestinal hemorrhage.   Constipation in the rectum.   Dilated fluid-filled esophagus with food stuffs in the distal esophagus.   Correlate for impacted food bolus.   No bowel obstruction.   Severely enlarged prostate gland.         Electronically signed by DEVENDRA GROVE      XR CHEST PORTABLE   Final Result   1. New opacities at the left lung base consistent with small left pleural   effusion and severe left lower lobe atelectasis. Follow-up to resolution is   suggested.         Electronically signed by HARRY FREIRE          XR CHEST PORTABLE  Result Date: 3/21/2025  EXAM: XR CHEST PORTABLE ACC#: [id number]  INDICATION: follow up  COMPARISON: 3/19/2025 TECHNIQUE: Portable AP upright view of the chest. FINDINGS: There are small bilateral pleural effusions which appear similar to the prior study. There is decreased vascular congestion. The cardiomediastinal configuration is within normal limits. No acute bony abnormalities.     Persistent small bilateral pleural effusions. Decreased pulmonary edema. Electronically signed by BRYAN Patel    XR KNEE LEFT (1-2 VIEWS)  Result Date: 3/21/2025  EXAM: XR KNEE LEFT (1-2 VIEWS) ACC#: [id number]  INDICATION: swelling pain  COMPARISON: None. TECHNIQUE: 2 views of the left knee. FINDINGS: No acute fractures, dislocations, or radiopaque foreign bodies. There are small osteophytes arising from the medial femoral 
views of the left knee. FINDINGS: No acute fractures, dislocations, or radiopaque foreign bodies. There are small osteophytes arising from the medial femoral condyle medial aspect of the tibia. There are vascular calcifications.     No acute bony abnormalities. Electronically signed by BRYAN Patel       This care involved high complexity decision making which includes independently reviewing the patient's past medical records, current laboratory results, medication profiles that were immediately available to me and actual Xray images at the bedside in order to assess, support vital system function, and to treat this degree of vital organ system failure, and to prevent further life threatening deterioration of the patient’s condition.    Medical Decision Making Today  Reviewed the flowsheet and previous days notes  Reviewed and summarized records or history from previous days note or discussions with staff, family  Parenteral controlled substances - Reviewed/ Adjusted / Weaned / Started  High Risk Drug therapy requiring intensive monitoring for toxicity: eg steroids, pressors, antibiotics  Review and order of Clinical lab tests  Review and Order of Radiology tests  Review and Order of Medicine tests  Independent visualization of radiologic Images  Reviewed Ventilator / NiPPV  I have personally reviewed the patients ECG / Telemetry  Diagnostic endoscopies with identified risk factors

## 2025-04-03 NOTE — CARE COORDINATION
Patient discharging home today with home health. Home health through [facility]. Orders sent via [facility].    Transition of Care Plan:    RUR: 19%  Prior Level of Functioning: ind  Disposition: HH  SOHA: today  If SNF or IPR: Date FOC offered: na  Date FOC received: na  Accepting facility: na  Date authorization started with reference number: na  Date authorization received and expires: na  Follow up appointments:   DME needed: rolling walker  Transportation at discharge: family  IM/IMM Medicare/ letter given: 04/03/2025  Is patient a [place] and connected with [facility]? na   If yes, was  transfer form completed and [facility] notified? na  Caregiver Contact: na  Discharge Caregiver contacted prior to discharge? na  Care Conference needed? na  Barriers to discharge: na

## 2025-04-03 NOTE — PLAN OF CARE
PHYSICAL THERAPY TREATMENT     Patient: Lei Jaimes (79 y.o. male)  Date: 3/21/2025  Diagnosis: Upper GI bleed [K92.2]  GIB (gastrointestinal bleeding) [K92.2] GIB (gastrointestinal bleeding)  Procedure(s) (LRB):  ESOPHAGOGASTRODUODENOSCOPY (N/A) 7 Days Post-Op  Precautions: Fall Risk                      Recommendations for nursing mobility: Encourage HEP in prep for ADLs/mobility; see handout for details, Frequent repositioning to prevent skin breakdown, Use of BSC for toileting , and AD and gt belt for bed to chair     In place during session: Peripheral IV, External Catheter, and EKG/telemetry   Chart, physical therapy assessment, plan of care and goals were reviewed.  ASSESSMENT  Patient continues with skilled PT services and is slowly progressing towards goals. Pt semi supine upon PT arrival, agreeable to session. Pt A&O x 4. (See below for objective details and assist levels).     Overall pt tolerated session fair today with PT.Patient wanting to go to the BSC, very anxious.Required min assist for transfers,able to wipe self after BM standing with RW and return to bed.Patient with decreased endurance to activities. at peak.  Will continue to benefit from skilled PT services, and will continue to progress as tolerated. Current PT DC recommendation Moderate intensity short-term skilled physical therapy up to 5x/week once medically appropriate.      GOALS:    Problem: Physical Therapy - Adult  Goal: By Discharge: Performs mobility at highest level of function for planned discharge setting.  See evaluation for individualized goals.  Description: FUNCTIONAL STATUS PRIOR TO ADMISSION: Patient was independent and active without use of DME.    HOME SUPPORT PRIOR TO ADMISSION: The patient lives with wife but did not require assistance.    Physical Therapy Goals  Initiated 3/19/2025  Pt stated goal: go home today  Pt will be I with LE HEP in 7 days.  Pt will perform bed mobility with [place] in 7 
         PHYSICAL THERAPY TREATMENT     Patient: Lei Jaimes (79 y.o. male)  Date: 3/23/2025  Diagnosis: Upper GI bleed [K92.2]  GIB (gastrointestinal bleeding) [K92.2] GIB (gastrointestinal bleeding)  Procedure(s) (LRB):  ESOPHAGOGASTRODUODENOSCOPY (N/A) 9 Days Post-Op  Precautions: Fall Risk                      Recommendations for nursing mobility: Out of bed to chair for meals, Encourage HEP in prep for ADLs/mobility; see handout for details, Frequent repositioning to prevent skin breakdown, and Use of BSC for toileting     In place during session: Nasal Cannula 5L, EKG/telemetry , and Pulse ox  Chart, physical therapy assessment, plan of care and goals were reviewed.  ASSESSMENT  Patient continues with skilled PT services and is progressing towards goals. Pt semi supine with wife at bedside upon PT arrival, agreeable to session. Pt A&O x 4. (See below for objective details and assist levels).     Overall pt tolerated session fair today with bed mobility, seated therex, transfers and ambulation. Pt continues to improve mobility to date requiring mostly SBA and VC for safety. Additional time spent on energy conservation methods and discharge planning, pt requesting BSC this date. SOB noted with exertion, SA02 maintain in low 90's. Pt with increased activity this date requesting to remain in bed end of treatment, educated on increasing time spent out of bed. Left supine in bed with all needs in reach.  Will continue to benefit from skilled PT services, and will continue to progress as tolerated. Current PT DC recommendation Intermittent physical therapy up to 2-3x/week in previous living setting with additional support needed for safety  once medically appropriate.     Start of Session End of Session   SPO2 (%) 94 91   Heart Rate (BPM) 116 121     GOALS:    Problem: Physical Therapy - Adult  Goal: By Discharge: Performs mobility at highest level of function for planned discharge setting.  See evaluation for 
         PHYSICAL THERAPY TREATMENT     Patient: Lei Jaimes (79 y.o. male)  Date: 4/1/2025  Diagnosis: Upper GI bleed [K92.2]  GIB (gastrointestinal bleeding) [K92.2] GIB (gastrointestinal bleeding)  Procedure(s) (LRB):  ESOPHAGOGASTRODUODENOSCOPY (N/A) 18 Days Post-Op  Precautions: Fall Risk                      Recommendations for nursing mobility: Out of bed to chair for meals, Encourage HEP in prep for ADLs/mobility; see handout for details, LE elevation for management of edema, AD and gt belt for bed to chair , Amb to bathroom with AD and gait belt, and Assist x1    In place during session: Nasal Cannula 7L and Pulse ox  Chart, physical therapy assessment, plan of care and goals were reviewed.  ASSESSMENT  Patient continues with skilled PT services and is progressing towards goals. Pt received semi supine upon PT arrival, agreeable to session. Pt A&O x 4. (See below for objective details and assist levels). Pt. Limited today since pt. Had Thoracentesis today. Pt. SOB and sats dropped while standing and taking a few steps with RW. O2 sat ranged from 86-89% with this activity. Returned pt. To bed due to SOB and O2 sats dropping since pt. Had procedure. Notified RN immediately.    Overall pt tolerated session fair today with bed mobility.  Will continue to benefit from skilled PT services, and will continue to progress as tolerated. Current PT DC recommendation Intermittent physical therapy up to 2-3x/week in previous living setting with additional support needed for safety  once medically appropriate.     Start of Session End of Session   SPO2 (%) 91 92   Heart Rate (BPM) 104 106     GOALS:    Problem: Physical Therapy - Adult  Goal: By Discharge: Performs mobility at highest level of function for planned discharge setting.  See evaluation for individualized goals.  Description: FUNCTIONAL STATUS PRIOR TO ADMISSION: Patient was independent and active without use of DME.    HOME SUPPORT PRIOR TO ADMISSION: The 
         PHYSICAL THERAPY TREATMENT     Patient: Lei Jaimes (79 y.o. male)  Date: 4/2/2025  Diagnosis: Upper GI bleed [K92.2]  GIB (gastrointestinal bleeding) [K92.2] GIB (gastrointestinal bleeding)  Procedure(s) (LRB):  ESOPHAGOGASTRODUODENOSCOPY (N/A) 19 Days Post-Op  Precautions: Fall Risk                      Recommendations for nursing mobility: Out of bed to chair for meals, AD and gt belt for bed to chair , and Assist x1    In place during session: Mid-Flow O2 5L  Chart, physical therapy assessment, plan of care and goals were reviewed.  ASSESSMENT  Patient continues with skilled PT services and is progressing towards goals. Pt semi supine in bed upon PT arrival, agreeable to session. Pt A&O x 4. (See below for objective details and assist levels).     Overall pt tolerated session fair today with overall SBA/CGA with bed mob and inc time needed to get to EOB.  Pt able to sit EOB without LOB.  Pt CGA for sit to stand and was able to stand without LOB and UE support on RW in order for nurse to change mepilex pad.  Pt was able to amb approx 4ft from bed to recliner chair with CGAx1 and use of RW.  Pt demos slow, shuffling gt pattern.  No LOB during amb.  Pt was on 5L O2 via NC during session.  SpO2 range 93-97% during session.  Will continue to benefit from skilled PT services, and will continue to progress as tolerated.   Pt continues to demo decreased bed mob, transfers, LE strength, gt, activity tolerance, balance, and overall functional mobility.  Previous goals reviewed and remain appropriate at this time.  Current PT DC recommendation Intermittent physical therapy up to 2-3x/week in previous living setting once medically appropriate.      GOALS:    Problem: Physical Therapy - Adult  Goal: By Discharge: Performs mobility at highest level of function for planned discharge setting.  See evaluation for individualized goals.  Description: FUNCTIONAL STATUS PRIOR TO ADMISSION: Patient was independent and 
  Problem: Cardiovascular - Adult  Goal: Maintains optimal cardiac output and hemodynamic stability  Outcome: Progressing  Flowsheets  Taken 3/14/2025 0501  Maintains optimal cardiac output and hemodynamic stability:   Monitor blood pressure and heart rate   Monitor urine output and notify Licensed Independent Practitioner for values outside of normal range   Administer fluid and/or volume expanders as ordered  Taken 3/13/2025 2000  Maintains optimal cardiac output and hemodynamic stability:   Monitor blood pressure and heart rate   Monitor urine output and notify Licensed Independent Practitioner for values outside of normal range     Problem: Respiratory - Adult  Goal: Achieves optimal ventilation and oxygenation  Outcome: Progressing  Flowsheets (Taken 3/14/2025 0501)  Achieves optimal ventilation and oxygenation:   Assess for changes in respiratory status   Assess for changes in mentation and behavior     Problem: Safety - Adult  Goal: Free from fall injury  Outcome: Progressing     
  Problem: Discharge Planning  Goal: Discharge to home or other facility with appropriate resources  3/21/2025 0801 by Felicity Coelho RN  Outcome: Progressing  Flowsheets (Taken 3/21/2025 0432 by Lauren Chopra RN)  Discharge to home or other facility with appropriate resources: Identify barriers to discharge with patient and caregiver  3/20/2025 2157 by Vilma Walker RN  Outcome: Progressing  Flowsheets (Taken 3/20/2025 2020)  Discharge to home or other facility with appropriate resources: Identify barriers to discharge with patient and caregiver     Problem: Safety - Adult  Goal: Free from fall injury  3/21/2025 0801 by Felicity Coelho RN  Outcome: Progressing  3/20/2025 2157 by Vilma Walker RN  Outcome: Progressing     Problem: Respiratory - Adult  Goal: Achieves optimal ventilation and oxygenation  3/21/2025 0801 by Felicity Coelho RN  Outcome: Progressing  Flowsheets (Taken 3/21/2025 0432 by Lauren Chopra RN)  Achieves optimal ventilation and oxygenation: Assess for changes in respiratory status  3/20/2025 2157 by Vilma Walker RN  Outcome: Progressing  Flowsheets (Taken 3/20/2025 2020)  Achieves optimal ventilation and oxygenation:   Assess for changes in respiratory status   Assess for changes in mentation and behavior     Problem: Cardiovascular - Adult  Goal: Maintains optimal cardiac output and hemodynamic stability  3/21/2025 0801 by Felicity Coelho RN  Outcome: Progressing  Flowsheets (Taken 3/21/2025 0432 by Lauren Chopra RN)  Maintains optimal cardiac output and hemodynamic stability: Monitor blood pressure and heart rate  3/20/2025 2157 by iVlma Walker RN  Outcome: Progressing  Flowsheets (Taken 3/20/2025 2020)  Maintains optimal cardiac output and hemodynamic stability:   Monitor blood pressure and heart rate   Monitor urine output and notify Licensed Independent Practitioner for values outside of normal range  Goal: Absence of cardiac dysrhythmias or at 
  Problem: Discharge Planning  Goal: Discharge to home or other facility with appropriate resources  3/30/2025 0030 by Harpreet Urias RN  Outcome: Progressing  Flowsheets (Taken 3/29/2025 1945)  Discharge to home or other facility with appropriate resources: Identify barriers to discharge with patient and caregiver     Problem: Safety - Adult  Goal: Free from fall injury  3/30/2025 1104 by Tari Martinez RN  Outcome: Progressing  3/30/2025 0030 by Harpreet Urias RN  Outcome: Progressing     Problem: Respiratory - Adult  Goal: Achieves optimal ventilation and oxygenation  3/30/2025 0030 by Harpreet Urias RN  Outcome: Progressing     Problem: Cardiovascular - Adult  Goal: Maintains optimal cardiac output and hemodynamic stability  3/30/2025 0030 by Harpreet Urias RN  Outcome: Progressing  Goal: Absence of cardiac dysrhythmias or at baseline  3/30/2025 0030 by Harpreet Urias RN  Outcome: Progressing     Problem: Gastrointestinal - Adult  Goal: Minimal or absence of nausea and vomiting  3/30/2025 0030 by Harpreet Urias RN  Outcome: Progressing  Goal: Maintains or returns to baseline bowel function  3/30/2025 0030 by Harpreet Urias RN  Outcome: Progressing  Goal: Maintains adequate nutritional intake  3/30/2025 0030 by Harpreet Urias RN  Outcome: Progressing  Goal: Establish and maintain optimal ostomy function  3/30/2025 0030 by Harpreet Urias RN  Outcome: Progressing     Problem: Skin/Tissue Integrity  Goal: Skin integrity remains intact  Description: 1.  Monitor for areas of redness and/or skin breakdown  2.  Assess vascular access sites hourly  3.  Every 4-6 hours minimum:  Change oxygen saturation probe site  4.  Every 4-6 hours:  If on nasal continuous positive airway pressure, respiratory therapy assess nares and determine need for appliance change or resting period  3/30/2025 1104 by Tari Martinez RN  Outcome: Progressing  3/30/2025 0030 by Harpreet Urias RN  Outcome: Progressing     Problem: 
  Problem: Discharge Planning  Goal: Discharge to home or other facility with appropriate resources  4/1/2025 1202 by Radha Dawson RN  Outcome: Progressing     Problem: Safety - Adult  Goal: Free from fall injury  4/2/2025 0100 by Dyan Narvaez RN  Outcome: Progressing  Flowsheets (Taken 3/16/2025 1228 by Elena Chavira RN)  Free From Fall Injury:   Instruct family/caregiver on patient safety   Based on caregiver fall risk screen, instruct family/caregiver to ask for assistance with transferring infant if caregiver noted to have fall risk factors  4/1/2025 1202 by Radha Dawson RN  Outcome: Progressing     Problem: Respiratory - Adult  Goal: Achieves optimal ventilation and oxygenation  4/2/2025 0100 by Dyan Narvaez RN  Outcome: Progressing  Flowsheets (Taken 3/30/2025 2119 by Veena Li, RN)  Achieves optimal ventilation and oxygenation:   Assess for changes in respiratory status   Assess for changes in mentation and behavior   Position to facilitate oxygenation and minimize respiratory effort   Oxygen supplementation based on oxygen saturation or arterial blood gases   Encourage broncho-pulmonary hygiene including cough, deep breathe, incentive spirometry   Assess the need for suctioning and aspirate as needed   Assess and instruct to report shortness of breath or any respiratory difficulty   Respiratory therapy support as indicated  4/1/2025 1202 by Radha Dawson RN  Outcome: Progressing     Problem: Cardiovascular - Adult  Goal: Maintains optimal cardiac output and hemodynamic stability  4/1/2025 1202 by Radha Dawson RN  Outcome: Progressing  Goal: Absence of cardiac dysrhythmias or at baseline  4/1/2025 1202 by Radha Dawson RN  Outcome: Progressing     Problem: Gastrointestinal - Adult  Goal: Minimal or absence of nausea and vomiting  4/1/2025 1202 by Radha Dawson RN  Outcome: Progressing  Goal: Maintains or returns to baseline bowel function  4/1/2025 1202 by 
  Problem: Discharge Planning  Goal: Discharge to home or other facility with appropriate resources  4/1/2025 1202 by Radha Dawson RN  Outcome: Progressing  4/1/2025 0256 by Dyan Narvaez, RN  Outcome: Progressing  Flowsheets (Taken 3/29/2025 1945 by Harpreet Urias, RN)  Discharge to home or other facility with appropriate resources: Identify barriers to discharge with patient and caregiver     Problem: Safety - Adult  Goal: Free from fall injury  4/1/2025 1202 by Radha Dawson RN  Outcome: Progressing  4/1/2025 0256 by Dyan Narvaez RN  Outcome: Progressing  Flowsheets (Taken 3/16/2025 1228 by Elena Chavira RN)  Free From Fall Injury:   Instruct family/caregiver on patient safety   Based on caregiver fall risk screen, instruct family/caregiver to ask for assistance with transferring infant if caregiver noted to have fall risk factors     Problem: Respiratory - Adult  Goal: Achieves optimal ventilation and oxygenation  Outcome: Progressing     Problem: Cardiovascular - Adult  Goal: Maintains optimal cardiac output and hemodynamic stability  Outcome: Progressing  Goal: Absence of cardiac dysrhythmias or at baseline  Outcome: Progressing     Problem: Gastrointestinal - Adult  Goal: Minimal or absence of nausea and vomiting  Outcome: Progressing  Goal: Maintains or returns to baseline bowel function  Outcome: Progressing  Goal: Maintains adequate nutritional intake  Outcome: Progressing  Goal: Establish and maintain optimal ostomy function  Outcome: Progressing     Problem: Skin/Tissue Integrity  Goal: Skin integrity remains intact  Description: 1.  Monitor for areas of redness and/or skin breakdown  2.  Assess vascular access sites hourly  3.  Every 4-6 hours minimum:  Change oxygen saturation probe site  4.  Every 4-6 hours:  If on nasal continuous positive airway pressure, respiratory therapy assess nares and determine need for appliance change or resting period  Outcome: Progressing   
  Problem: Discharge Planning  Goal: Discharge to home or other facility with appropriate resources  Outcome: Progressing     Problem: Safety - Adult  Goal: Free from fall injury  3/22/2025 0725 by Felicity Coelho RN  Outcome: Progressing  3/22/2025 0106 by Krista Baptiste RN  Outcome: Progressing     Problem: Respiratory - Adult  Goal: Achieves optimal ventilation and oxygenation  3/22/2025 0725 by Felicity Coelho RN  Outcome: Progressing  3/22/2025 0106 by Krista Baptiste RN  Outcome: Progressing     Problem: Cardiovascular - Adult  Goal: Maintains optimal cardiac output and hemodynamic stability  3/22/2025 0725 by Felicity Coelho RN  Outcome: Progressing  3/22/2025 0106 by Krista Baptiste RN  Outcome: Progressing  Goal: Absence of cardiac dysrhythmias or at baseline  Outcome: Progressing     Problem: Gastrointestinal - Adult  Goal: Minimal or absence of nausea and vomiting  Outcome: Progressing  Goal: Maintains or returns to baseline bowel function  Outcome: Progressing  Goal: Maintains adequate nutritional intake  Outcome: Progressing  Goal: Establish and maintain optimal ostomy function  Outcome: Progressing     Problem: Skin/Tissue Integrity  Goal: Skin integrity remains intact  Description: 1.  Monitor for areas of redness and/or skin breakdown  2.  Assess vascular access sites hourly  3.  Every 4-6 hours minimum:  Change oxygen saturation probe site  4.  Every 4-6 hours:  If on nasal continuous positive airway pressure, respiratory therapy assess nares and determine need for appliance change or resting period  Outcome: Progressing     Problem: Pain  Goal: Verbalizes/displays adequate comfort level or baseline comfort level  Outcome: Progressing     
  Problem: Discharge Planning  Goal: Discharge to home or other facility with appropriate resources  Outcome: Progressing     Problem: Safety - Adult  Goal: Free from fall injury  3/23/2025 0754 by Bell Ferrell RN  Outcome: Progressing  3/23/2025 0220 by Krista Baptiste RN  Outcome: Progressing     Problem: Respiratory - Adult  Goal: Achieves optimal ventilation and oxygenation  3/23/2025 0754 by Bell Ferrell RN  Outcome: Progressing  3/23/2025 0220 by Krista Baptiste RN  Outcome: Progressing     Problem: Cardiovascular - Adult  Goal: Maintains optimal cardiac output and hemodynamic stability  3/23/2025 0754 by Bell Ferrell RN  Outcome: Progressing  3/23/2025 0220 by Krista Baptiste RN  Outcome: Progressing  Goal: Absence of cardiac dysrhythmias or at baseline  Outcome: Progressing     Problem: Gastrointestinal - Adult  Goal: Minimal or absence of nausea and vomiting  Outcome: Progressing  Goal: Maintains or returns to baseline bowel function  Outcome: Progressing  Goal: Maintains adequate nutritional intake  Outcome: Progressing  Goal: Establish and maintain optimal ostomy function  Outcome: Progressing     Problem: Skin/Tissue Integrity  Goal: Skin integrity remains intact  Description: 1.  Monitor for areas of redness and/or skin breakdown  2.  Assess vascular access sites hourly  3.  Every 4-6 hours minimum:  Change oxygen saturation probe site  4.  Every 4-6 hours:  If on nasal continuous positive airway pressure, respiratory therapy assess nares and determine need for appliance change or resting period  Outcome: Progressing     Problem: Pain  Goal: Verbalizes/displays adequate comfort level or baseline comfort level  Outcome: Progressing     
  Problem: Discharge Planning  Goal: Discharge to home or other facility with appropriate resources  Outcome: Progressing     Problem: Safety - Adult  Goal: Free from fall injury  Outcome: Progressing     Problem: Respiratory - Adult  Goal: Achieves optimal ventilation and oxygenation  3/31/2025 1118 by Radha Dawson RN  Outcome: Progressing  3/30/2025 2119 by Veena Li, RN  Outcome: Progressing  Flowsheets (Taken 3/30/2025 2119)  Achieves optimal ventilation and oxygenation:   Assess for changes in respiratory status   Assess for changes in mentation and behavior   Position to facilitate oxygenation and minimize respiratory effort   Oxygen supplementation based on oxygen saturation or arterial blood gases   Encourage broncho-pulmonary hygiene including cough, deep breathe, incentive spirometry   Assess the need for suctioning and aspirate as needed   Assess and instruct to report shortness of breath or any respiratory difficulty   Respiratory therapy support as indicated     Problem: Cardiovascular - Adult  Goal: Maintains optimal cardiac output and hemodynamic stability  Outcome: Progressing  Goal: Absence of cardiac dysrhythmias or at baseline  Outcome: Progressing     Problem: Gastrointestinal - Adult  Goal: Minimal or absence of nausea and vomiting  Outcome: Progressing  Goal: Maintains or returns to baseline bowel function  Outcome: Progressing  Goal: Maintains adequate nutritional intake  Outcome: Progressing  Goal: Establish and maintain optimal ostomy function  Outcome: Progressing     Problem: Skin/Tissue Integrity  Goal: Skin integrity remains intact  Description: 1.  Monitor for areas of redness and/or skin breakdown  2.  Assess vascular access sites hourly  3.  Every 4-6 hours minimum:  Change oxygen saturation probe site  4.  Every 4-6 hours:  If on nasal continuous positive airway pressure, respiratory therapy assess nares and determine need for appliance change or resting period  Outcome: 
  Problem: Discharge Planning  Goal: Discharge to home or other facility with appropriate resources  Outcome: Progressing     Problem: Safety - Adult  Goal: Free from fall injury  Outcome: Progressing     Problem: Respiratory - Adult  Goal: Achieves optimal ventilation and oxygenation  Outcome: Progressing     Problem: Cardiovascular - Adult  Goal: Maintains optimal cardiac output and hemodynamic stability  Outcome: Progressing     Problem: Cardiovascular - Adult  Goal: Absence of cardiac dysrhythmias or at baseline  Outcome: Progressing     Problem: Gastrointestinal - Adult  Goal: Minimal or absence of nausea and vomiting  Outcome: Progressing     Problem: Gastrointestinal - Adult  Goal: Maintains or returns to baseline bowel function  Outcome: Progressing     Problem: Musculoskeletal - Adult  Goal: Return mobility to safest level of function  Outcome: Progressing     Problem: Musculoskeletal - Adult  Goal: Maintain proper alignment of affected body part  Outcome: Progressing     Problem: Musculoskeletal - Adult  Goal: Return ADL status to a safe level of function  Outcome: Progressing     Problem: Chronic Conditions and Co-morbidities  Goal: Patient's chronic conditions and co-morbidity symptoms are monitored and maintained or improved  Outcome: Progressing     
  Problem: Discharge Planning  Goal: Discharge to home or other facility with appropriate resources  Outcome: Progressing     Problem: Safety - Adult  Goal: Free from fall injury  Outcome: Progressing     Problem: Respiratory - Adult  Goal: Achieves optimal ventilation and oxygenation  Outcome: Progressing     Problem: Cardiovascular - Adult  Goal: Maintains optimal cardiac output and hemodynamic stability  Outcome: Progressing  Goal: Absence of cardiac dysrhythmias or at baseline  Outcome: Progressing     Problem: Gastrointestinal - Adult  Goal: Minimal or absence of nausea and vomiting  Outcome: Progressing  Goal: Maintains or returns to baseline bowel function  Outcome: Progressing  Goal: Maintains adequate nutritional intake  Outcome: Progressing  Goal: Establish and maintain optimal ostomy function  Outcome: Progressing     Problem: Skin/Tissue Integrity  Goal: Skin integrity remains intact  Description: 1.  Monitor for areas of redness and/or skin breakdown  2.  Assess vascular access sites hourly  3.  Every 4-6 hours minimum:  Change oxygen saturation probe site  4.  Every 4-6 hours:  If on nasal continuous positive airway pressure, respiratory therapy assess nares and determine need for appliance change or resting period  Outcome: Progressing     Problem: Pain  Goal: Verbalizes/displays adequate comfort level or baseline comfort level  Outcome: Progressing     Problem: Physical Therapy - Adult  Goal: By Discharge: Performs mobility at highest level of function for planned discharge setting.  See evaluation for individualized goals.  Description: FUNCTIONAL STATUS PRIOR TO ADMISSION: Patient was independent and active without use of DME.    HOME SUPPORT PRIOR TO ADMISSION: The patient lives with wife but did not require assistance.    Physical Therapy Goals  Initiated 3/19/2025  Pt stated goal: go home today  Pt will be I with [facility] in 7 days.  Pt will perform bed mobility with [place] in 7 days.  Pt 
  Problem: Discharge Planning  Goal: Discharge to home or other facility with appropriate resources  Outcome: Progressing  Flowsheets (Taken 3/15/2025 0700)  Discharge to home or other facility with appropriate resources:   Identify barriers to discharge with patient and caregiver   Arrange for needed discharge resources and transportation as appropriate   Refer to discharge planning if patient needs post-hospital services based on physician order or complex needs related to functional status, cognitive ability or social support system     Problem: Safety - Adult  Goal: Free from fall injury  Outcome: Progressing  Flowsheets (Taken 3/15/2025 1540)  Free From Fall Injury:   Instruct family/caregiver on patient safety   Based on caregiver fall risk screen, instruct family/caregiver to ask for assistance with transferring infant if caregiver noted to have fall risk factors     Problem: Cardiovascular - Adult  Goal: Maintains optimal cardiac output and hemodynamic stability  Outcome: Progressing  Flowsheets (Taken 3/15/2025 0700)  Maintains optimal cardiac output and hemodynamic stability:   Monitor blood pressure and heart rate   Monitor urine output and notify Licensed Independent Practitioner for values outside of normal range   Administer fluid and/or volume expanders as ordered     Problem: Gastrointestinal - Adult  Goal: Minimal or absence of nausea and vomiting  Outcome: Progressing  Flowsheets  Taken 3/15/2025 1540  Minimal or absence of nausea and vomiting:   Provide nonpharmacologic comfort measures as appropriate   Advance diet as tolerated, if ordered  Taken 3/15/2025 0700  Minimal or absence of nausea and vomiting:   Administer IV fluids as ordered to ensure adequate hydration   Maintain NPO status until nausea and vomiting are resolved  Goal: Maintains or returns to baseline bowel function  Outcome: Progressing  Flowsheets (Taken 3/15/2025 1540)  Maintains or returns to baseline bowel function:   Assess 
  Problem: Discharge Planning  Goal: Discharge to home or other facility with appropriate resources  Outcome: Progressing  Flowsheets (Taken 3/16/2025 0700)  Discharge to home or other facility with appropriate resources:   Identify barriers to discharge with patient and caregiver   Arrange for needed discharge resources and transportation as appropriate   Refer to discharge planning if patient needs post-hospital services based on physician order or complex needs related to functional status, cognitive ability or social support system     Problem: Safety - Adult  Goal: Free from fall injury  Outcome: Progressing  Flowsheets (Taken 3/16/2025 1228)  Free From Fall Injury:   Instruct family/caregiver on patient safety   Based on caregiver fall risk screen, instruct family/caregiver to ask for assistance with transferring infant if caregiver noted to have fall risk factors     Problem: Respiratory - Adult  Goal: Achieves optimal ventilation and oxygenation  Outcome: Progressing  Flowsheets (Taken 3/16/2025 1228)  Achieves optimal ventilation and oxygenation:   Assess for changes in respiratory status   Assess for changes in mentation and behavior   Position to facilitate oxygenation and minimize respiratory effort   Oxygen supplementation based on oxygen saturation or arterial blood gases   Assess and instruct to report shortness of breath or any respiratory difficulty   Respiratory therapy support as indicated     Problem: Gastrointestinal - Adult  Goal: Minimal or absence of nausea and vomiting  Outcome: Progressing  Goal: Maintains adequate nutritional intake  Outcome: Progressing     
  Problem: Discharge Planning  Goal: Discharge to home or other facility with appropriate resources  Outcome: Progressing  Flowsheets (Taken 3/20/2025 2020)  Discharge to home or other facility with appropriate resources: Identify barriers to discharge with patient and caregiver     Problem: Safety - Adult  Goal: Free from fall injury  Outcome: Progressing     Problem: Respiratory - Adult  Goal: Achieves optimal ventilation and oxygenation  Outcome: Progressing  Flowsheets (Taken 3/20/2025 2020)  Achieves optimal ventilation and oxygenation:   Assess for changes in respiratory status   Assess for changes in mentation and behavior     Problem: Cardiovascular - Adult  Goal: Maintains optimal cardiac output and hemodynamic stability  Outcome: Progressing  Flowsheets (Taken 3/20/2025 2020)  Maintains optimal cardiac output and hemodynamic stability:   Monitor blood pressure and heart rate   Monitor urine output and notify Licensed Independent Practitioner for values outside of normal range  Goal: Absence of cardiac dysrhythmias or at baseline  Outcome: Progressing  Flowsheets (Taken 3/20/2025 2020)  Absence of cardiac dysrhythmias or at baseline:   Monitor cardiac rate and rhythm   Assess for signs of decreased cardiac output     Problem: Gastrointestinal - Adult  Goal: Minimal or absence of nausea and vomiting  Outcome: Progressing  Goal: Maintains or returns to baseline bowel function  Outcome: Progressing  Flowsheets (Taken 3/20/2025 2020)  Maintains or returns to baseline bowel function: Assess bowel function  Goal: Maintains adequate nutritional intake  Outcome: Progressing  Flowsheets (Taken 3/20/2025 2020)  Maintains adequate nutritional intake: Monitor percentage of each meal consumed  Goal: Establish and maintain optimal ostomy function  Outcome: Progressing  Flowsheets (Taken 3/20/2025 2020)  Establish and maintain optimal ostomy function: Monitor output from ostomies     Problem: Pain  Goal: 
  Problem: Discharge Planning  Goal: Discharge to home or other facility with appropriate resources  Outcome: Progressing  Flowsheets (Taken 3/29/2025 1945 by Harpreet Urias, RN)  Discharge to home or other facility with appropriate resources: Identify barriers to discharge with patient and caregiver     Problem: Safety - Adult  Goal: Free from fall injury  Outcome: Progressing  Flowsheets (Taken 3/16/2025 1228 by Elena Chavira, RN)  Free From Fall Injury:   Instruct family/caregiver on patient safety   Based on caregiver fall risk screen, instruct family/caregiver to ask for assistance with transferring infant if caregiver noted to have fall risk factors     Problem: Hematologic - Adult  Goal: Maintains hematologic stability  Outcome: Progressing  Flowsheets (Taken 4/1/2025 0256)  Maintains hematologic stability:   Assess for signs and symptoms of bleeding or hemorrhage   Monitor labs for bleeding or clotting disorders   Administer blood products/factors as ordered     
  Problem: Physical Therapy - Adult  Goal: By Discharge: Performs mobility at highest level of function for planned discharge setting.  See evaluation for individualized goals.  Description: FUNCTIONAL STATUS PRIOR TO ADMISSION: Patient was independent and active without use of DME.    HOME SUPPORT PRIOR TO ADMISSION: The patient lives with wife but did not require assistance.    Physical Therapy Goals  Initiated 3/19/2025  Pt stated goal: go home today  Pt will be I with [facility] in 7 days.  Pt will perform bed mobility with [place] in 7 days.  Pt will perform transfers with [place] in 7 days.   Pt will amb 100 feet with LRAD safely with [place] in 7 days.  Pt will ascend/descend 3 steps with 1 handrail(s) and Supervision in 7 days to safely enter/navigate home.   Pt will demonstrate improvement in dynamic balance from CGA to supervision in 7 days.     Outcome: Progressing            PHYSICAL THERAPY TREATMENT     Patient: Lei Jaimes (79 y.o. male)  Date: 3/30/2025  Diagnosis: Upper GI bleed [K92.2]  GIB (gastrointestinal bleeding) [K92.2] GIB (gastrointestinal bleeding)  Procedure(s) (LRB):  ESOPHAGOGASTRODUODENOSCOPY (N/A) 16 Days Post-Op  Precautions: Fall Risk                      Recommendations for nursing mobility: Out of bed to chair for meals, Encourage HEP in prep for ADLs/mobility; see handout for details, AD and gt belt for bed to chair , Amb to bathroom with AD and gait belt, Amb in hallway, and Assist x1    In place during session: Peripheral IV, External Catheter, and EKG/telemetry , 2.5 L NC  Chart, physical therapy assessment, plan of care and goals were reviewed.  ASSESSMENT  Patient continues with skilled PT services and is progressing towards goals. Pt semi-supine upon PT arrival, agreeable to session. Pt A&O x 4. Family present in room with permission from patient. (See below for objective details and assist levels).     Overall pt tolerated session fair today with bed mobility 
  Problem: Respiratory - Adult  Goal: Achieves optimal ventilation and oxygenation  3/16/2025 2106 by Karina Spangler, FREDDY  Outcome: Progressing  Flowsheets  Taken 3/16/2025 2106  Achieves optimal ventilation and oxygenation:   Assess for changes in respiratory status   Position to facilitate oxygenation and minimize respiratory effort   Assess the need for suctioning and aspirate as needed   Respiratory therapy support as indicated   Assess for changes in mentation and behavior   Oxygen supplementation based on oxygen saturation or arterial blood gases   Assess and instruct to report shortness of breath or any respiratory difficulty  Taken 3/16/2025 1945  Achieves optimal ventilation and oxygenation:   Assess for changes in respiratory status   Assess for changes in mentation and behavior   Position to facilitate oxygenation and minimize respiratory effort   Oxygen supplementation based on oxygen saturation or arterial blood gases   Assess the need for suctioning and aspirate as needed  3/16/2025 1228 by Elena Chavira RN  Outcome: Progressing  Flowsheets (Taken 3/16/2025 1228)  Achieves optimal ventilation and oxygenation:   Assess for changes in respiratory status   Assess for changes in mentation and behavior   Position to facilitate oxygenation and minimize respiratory effort   Oxygen supplementation based on oxygen saturation or arterial blood gases   Assess and instruct to report shortness of breath or any respiratory difficulty   Respiratory therapy support as indicated     Problem: Cardiovascular - Adult  Goal: Maintains optimal cardiac output and hemodynamic stability  3/16/2025 2106 by Karina Spangler, FREDDY  Outcome: Progressing  Flowsheets  Taken 3/16/2025 2106  Maintains optimal cardiac output and hemodynamic stability:   Monitor blood pressure and heart rate   Monitor urine output and notify Licensed Independent Practitioner for values outside of normal range  Taken 3/16/2025 
  Problem: Respiratory - Adult  Goal: Achieves optimal ventilation and oxygenation  4/2/2025 0100 by Dyan Narvaez RN  Outcome: Progressing  Flowsheets (Taken 3/30/2025 2119 by Veena Li, RN)  Achieves optimal ventilation and oxygenation:   Assess for changes in respiratory status   Assess for changes in mentation and behavior   Position to facilitate oxygenation and minimize respiratory effort   Oxygen supplementation based on oxygen saturation or arterial blood gases   Encourage broncho-pulmonary hygiene including cough, deep breathe, incentive spirometry   Assess the need for suctioning and aspirate as needed   Assess and instruct to report shortness of breath or any respiratory difficulty   Respiratory therapy support as indicated  4/1/2025 1202 by Radha Dawson, RN  Outcome: Progressing     Problem: Skin/Tissue Integrity  Goal: Skin integrity remains intact  Description: 1.  Monitor for areas of redness and/or skin breakdown  2.  Assess vascular access sites hourly  3.  Every 4-6 hours minimum:  Change oxygen saturation probe site  4.  Every 4-6 hours:  If on nasal continuous positive airway pressure, respiratory therapy assess nares and determine need for appliance change or resting period  4/2/2025 0100 by Dyan Narvaez, RN  Outcome: Progressing  Flowsheets (Taken 3/24/2025 0800 by Yvette Ribeiro, RN)  Skin Integrity Remains Intact:   Monitor for areas of redness and/or skin breakdown   Assess vascular access sites hourly   Every 4-6 hours minimum: Change oxygen saturation probe site   Every 4-6 hours: If on nasal continuous positive airway pressure, respiratory therapy assesses nares and determine need for appliance change or resting period  4/1/2025 1202 by Radha Dawson, RN  Outcome: Progressing     
  Problem: Respiratory - Adult  Goal: Achieves optimal ventilation and oxygenation  Outcome: Progressing     Problem: Pain  Goal: Verbalizes/displays adequate comfort level or baseline comfort level  Outcome: Progressing     
  Problem: Respiratory - Adult  Goal: Achieves optimal ventilation and oxygenation  Outcome: Progressing  Flowsheets  Taken 3/18/2025 0123  Achieves optimal ventilation and oxygenation:   Assess for changes in respiratory status   Position to facilitate oxygenation and minimize respiratory effort   Respiratory therapy support as indicated   Assess for changes in mentation and behavior   Oxygen supplementation based on oxygen saturation or arterial blood gases   Assess and instruct to report shortness of breath or any respiratory difficulty  Taken 3/17/2025 2000  Achieves optimal ventilation and oxygenation:   Assess for changes in respiratory status   Assess for changes in mentation and behavior   Position to facilitate oxygenation and minimize respiratory effort   Oxygen supplementation based on oxygen saturation or arterial blood gases     Problem: Cardiovascular - Adult  Goal: Maintains optimal cardiac output and hemodynamic stability  Outcome: Progressing  Flowsheets  Taken 3/18/2025 0123  Maintains optimal cardiac output and hemodynamic stability: Monitor blood pressure and heart rate  Taken 3/17/2025 2000  Maintains optimal cardiac output and hemodynamic stability:   Monitor blood pressure and heart rate   Monitor urine output and notify Licensed Independent Practitioner for values outside of normal range     Problem: Gastrointestinal - Adult  Goal: Maintains or returns to baseline bowel function  Outcome: Progressing  Flowsheets  Taken 3/18/2025 0123  Maintains or returns to baseline bowel function:   Assess bowel function   Encourage mobilization and activity  Taken 3/17/2025 2000  Maintains or returns to baseline bowel function: Assess bowel function     
  Problem: Respiratory - Adult  Goal: Achieves optimal ventilation and oxygenation  Outcome: Progressing  Flowsheets (Taken 3/30/2025 2119 by Veena Li, RN)  Achieves optimal ventilation and oxygenation:   Assess for changes in respiratory status   Assess for changes in mentation and behavior   Position to facilitate oxygenation and minimize respiratory effort   Oxygen supplementation based on oxygen saturation or arterial blood gases   Encourage broncho-pulmonary hygiene including cough, deep breathe, incentive spirometry   Assess the need for suctioning and aspirate as needed   Assess and instruct to report shortness of breath or any respiratory difficulty   Respiratory therapy support as indicated     Problem: Cardiovascular - Adult  Goal: Maintains optimal cardiac output and hemodynamic stability  Outcome: Progressing  Flowsheets (Taken 3/24/2025 0800 by Yvette Ribeiro, RN)  Maintains optimal cardiac output and hemodynamic stability:   Monitor blood pressure and heart rate   Monitor urine output and notify Licensed Independent Practitioner for values outside of normal range   Assess for signs of decreased cardiac output   Administer fluid and/or volume expanders as ordered   Administer vasoactive medications as ordered     Problem: Physical Therapy - Adult  Goal: By Discharge: Performs mobility at highest level of function for planned discharge setting.  See evaluation for individualized goals.  Description: FUNCTIONAL STATUS PRIOR TO ADMISSION: Patient was independent and active without use of DME.    HOME SUPPORT PRIOR TO ADMISSION: The patient lives with wife but did not require assistance.    Physical Therapy Goals  Initiated 3/19/2025  Pt stated goal: go home today  Pt will be I with [facility] in 7 days.  Pt will perform bed mobility with [place] in 7 days.  Pt will perform transfers with [place] in 7 days.   Pt will amb 100 feet with LRAD safely with [place] in 7 days.  Pt will ascend/descend 3 
  Problem: Respiratory - Adult  Goal: Achieves optimal ventilation and oxygenation  Outcome: Progressing  Flowsheets (Taken 3/30/2025 2119)  Achieves optimal ventilation and oxygenation:   Assess for changes in respiratory status   Assess for changes in mentation and behavior   Position to facilitate oxygenation and minimize respiratory effort   Oxygen supplementation based on oxygen saturation or arterial blood gases   Encourage broncho-pulmonary hygiene including cough, deep breathe, incentive spirometry   Assess the need for suctioning and aspirate as needed   Assess and instruct to report shortness of breath or any respiratory difficulty   Respiratory therapy support as indicated     
  Problem: Safety - Adult  Goal: Free from fall injury  3/14/2025 1055 by Alberto Lord RN  Outcome: Progressing  3/14/2025 0501 by Karina Spangler RN  Outcome: Progressing     Problem: Respiratory - Adult  Goal: Achieves optimal ventilation and oxygenation  3/14/2025 1055 by Alberto Lord RN  Outcome: Progressing  3/14/2025 0501 by Karina Spangler RN  Outcome: Progressing  Flowsheets (Taken 3/14/2025 0501)  Achieves optimal ventilation and oxygenation:   Assess for changes in respiratory status   Assess for changes in mentation and behavior     Problem: Cardiovascular - Adult  Goal: Maintains optimal cardiac output and hemodynamic stability  3/14/2025 1055 by Alberto Lord RN  Outcome: Progressing  3/14/2025 0501 by Karina Spangler RN  Outcome: Progressing  Flowsheets  Taken 3/14/2025 0501  Maintains optimal cardiac output and hemodynamic stability:   Monitor blood pressure and heart rate   Monitor urine output and notify Licensed Independent Practitioner for values outside of normal range   Administer fluid and/or volume expanders as ordered  Taken 3/13/2025 2000  Maintains optimal cardiac output and hemodynamic stability:   Monitor blood pressure and heart rate   Monitor urine output and notify Licensed Independent Practitioner for values outside of normal range  Goal: Absence of cardiac dysrhythmias or at baseline  Outcome: Progressing     Problem: Gastrointestinal - Adult  Goal: Minimal or absence of nausea and vomiting  Outcome: Progressing     Problem: Skin/Tissue Integrity  Goal: Skin integrity remains intact  Description: 1.  Monitor for areas of redness and/or skin breakdown  2.  Assess vascular access sites hourly  3.  Every 4-6 hours minimum:  Change oxygen saturation probe site  4.  Every 4-6 hours:  If on nasal continuous positive airway pressure, respiratory therapy assess nares and determine need for appliance change or resting period  Outcome: Progressing     Problem: 
  Problem: Safety - Adult  Goal: Free from fall injury  3/14/2025 2339 by Karina Spangler RN  Outcome: Progressing  3/14/2025 1055 by Alberto Lord RN  Outcome: Progressing     Problem: Respiratory - Adult  Goal: Achieves optimal ventilation and oxygenation  3/14/2025 2340 by Karina Spangler RN  Outcome: Progressing  3/14/2025 2339 by Karina Spangler RN  Outcome: Progressing  Flowsheets (Taken 3/14/2025 2000)  Achieves optimal ventilation and oxygenation:   Assess for changes in respiratory status   Assess for changes in mentation and behavior  3/14/2025 1055 by Alberto Lord RN  Outcome: Progressing         Problem: Cardiovascular - Adult  Goal: Maintains optimal cardiac output and hemodynamic stability  Recent Flowsheet Documentation  Taken 3/14/2025 2000 by Karina Spangler RN  Maintains optimal cardiac output and hemodynamic stability:   Monitor blood pressure and heart rate   Monitor urine output and notify Licensed Independent Practitioner for values outside of normal range  3/14/2025 1055 by Alberto Lord RN  Outcome: Progressing     Problem: Gastrointestinal - Adult  Goal: Minimal or absence of nausea and vomiting  3/14/2025 2340 by Karina Spangler RN  Outcome: Progressing  Flowsheets (Taken 3/14/2025 2000)  Minimal or absence of nausea and vomiting:   Administer IV fluids as ordered to ensure adequate hydration   Maintain NPO status until nausea and vomiting are resolved   Administer ordered antiemetic medications as needed  3/14/2025 2339 by Karina Spangler RN  Outcome: Progressing  Flowsheets (Taken 3/14/2025 2000)  Minimal or absence of nausea and vomiting:   Administer IV fluids as ordered to ensure adequate hydration   Maintain NPO status until nausea and vomiting are resolved   Administer ordered antiemetic medications as needed  3/14/2025 1055 by Alberto Lord RN  Outcome: Progressing     
  Problem: Safety - Adult  Goal: Free from fall injury  3/15/2025 2150 by INDIO Vela RN  Outcome: Progressing  3/15/2025 1540 by Elena Chavira RN  Outcome: Progressing  Flowsheets (Taken 3/15/2025 1540)  Free From Fall Injury:   Instruct family/caregiver on patient safety   Based on caregiver fall risk screen, instruct family/caregiver to ask for assistance with transferring infant if caregiver noted to have fall risk factors     Problem: Respiratory - Adult  Goal: Achieves optimal ventilation and oxygenation  Outcome: Progressing     Problem: Cardiovascular - Adult  Goal: Maintains optimal cardiac output and hemodynamic stability  3/15/2025 2150 by INDIO Vela RN  Outcome: Progressing  3/15/2025 1540 by Elena Chavira RN  Outcome: Progressing  Flowsheets (Taken 3/15/2025 0700)  Maintains optimal cardiac output and hemodynamic stability:   Monitor blood pressure and heart rate   Monitor urine output and notify Licensed Independent Practitioner for values outside of normal range   Administer fluid and/or volume expanders as ordered     Problem: Skin/Tissue Integrity  Goal: Skin integrity remains intact  Description: 1.  Monitor for areas of redness and/or skin breakdown  2.  Assess vascular access sites hourly  3.  Every 4-6 hours minimum:  Change oxygen saturation probe site  4.  Every 4-6 hours:  If on nasal continuous positive airway pressure, respiratory therapy assess nares and determine need for appliance change or resting period  Outcome: Progressing     Problem: Pain  Goal: Verbalizes/displays adequate comfort level or baseline comfort level  Outcome: Progressing     
  Problem: Safety - Adult  Goal: Free from fall injury  3/29/2025 1028 by Stew Hutton RN  Outcome: Progressing  3/28/2025 2157 by Blanca Chen RN  Outcome: Progressing     Problem: Respiratory - Adult  Goal: Achieves optimal ventilation and oxygenation  3/29/2025 1028 by Stew Hutton RN  Outcome: Progressing  3/28/2025 2157 by Blanca Chen RN  Outcome: Progressing     Problem: Cardiovascular - Adult  Goal: Maintains optimal cardiac output and hemodynamic stability  3/29/2025 1028 by Stew Hutton RN  Outcome: Progressing  3/28/2025 2157 by Blanca Chen RN  Outcome: Progressing     Problem: Gastrointestinal - Adult  Goal: Maintains adequate nutritional intake  Outcome: Progressing     Problem: Skin/Tissue Integrity  Goal: Skin integrity remains intact  Description: 1.  Monitor for areas of redness and/or skin breakdown  2.  Assess vascular access sites hourly  3.  Every 4-6 hours minimum:  Change oxygen saturation probe site  4.  Every 4-6 hours:  If on nasal continuous positive airway pressure, respiratory therapy assess nares and determine need for appliance change or resting period  Outcome: Progressing     Problem: Pain  Goal: Verbalizes/displays adequate comfort level or baseline comfort level  Outcome: Progressing     Problem: Musculoskeletal - Adult  Goal: Return mobility to safest level of function  Outcome: Progressing     Problem: Chronic Conditions and Co-morbidities  Goal: Patient's chronic conditions and co-morbidity symptoms are monitored and maintained or improved  Outcome: Progressing     Problem: Nutrition Deficit:  Goal: Optimize nutritional status  Outcome: Progressing     
  Problem: Safety - Adult  Goal: Free from fall injury  4/2/2025 0848 by Stew Hutton, RN  Outcome: Progressing  4/2/2025 0100 by Dyan Narvaez, RN  Outcome: Progressing  Flowsheets (Taken 3/16/2025 1228 by Elena Chavira, RN)  Free From Fall Injury:   Instruct family/caregiver on patient safety   Based on caregiver fall risk screen, instruct family/caregiver to ask for assistance with transferring infant if caregiver noted to have fall risk factors     Problem: Respiratory - Adult  Goal: Achieves optimal ventilation and oxygenation  4/2/2025 0848 by Stew Hutton RN  Outcome: Progressing  4/2/2025 0100 by Dyan Narvaez, RN  Outcome: Progressing  Flowsheets (Taken 3/30/2025 2119 by Veena Li, RN)  Achieves optimal ventilation and oxygenation:   Assess for changes in respiratory status   Assess for changes in mentation and behavior   Position to facilitate oxygenation and minimize respiratory effort   Oxygen supplementation based on oxygen saturation or arterial blood gases   Encourage broncho-pulmonary hygiene including cough, deep breathe, incentive spirometry   Assess the need for suctioning and aspirate as needed   Assess and instruct to report shortness of breath or any respiratory difficulty   Respiratory therapy support as indicated     Problem: Cardiovascular - Adult  Goal: Maintains optimal cardiac output and hemodynamic stability  Outcome: Progressing     Problem: Gastrointestinal - Adult  Goal: Maintains adequate nutritional intake  Outcome: Progressing     Problem: Skin/Tissue Integrity  Goal: Skin integrity remains intact  Description: 1.  Monitor for areas of redness and/or skin breakdown  2.  Assess vascular access sites hourly  3.  Every 4-6 hours minimum:  Change oxygen saturation probe site  4.  Every 4-6 hours:  If on nasal continuous positive airway pressure, respiratory therapy assess nares and determine need for appliance change or resting period  4/2/2025 0848 by Stew Hutton 
  Problem: Safety - Adult  Goal: Free from fall injury  Outcome: Progressing     Problem: Respiratory - Adult  Goal: Achieves optimal ventilation and oxygenation  Outcome: Progressing     Problem: Cardiovascular - Adult  Goal: Maintains optimal cardiac output and hemodynamic stability  Outcome: Progressing     
  Problem: Safety - Adult  Goal: Free from fall injury  Outcome: Progressing     Problem: Respiratory - Adult  Goal: Achieves optimal ventilation and oxygenation  Outcome: Progressing     Problem: Cardiovascular - Adult  Goal: Maintains optimal cardiac output and hemodynamic stability  Outcome: Progressing     Problem: Physical Therapy - Adult  Goal: By Discharge: Performs mobility at highest level of function for planned discharge setting.  See evaluation for individualized goals.  Description: FUNCTIONAL STATUS PRIOR TO ADMISSION: Patient was independent and active without use of DME.    HOME SUPPORT PRIOR TO ADMISSION: The patient lives with wife but did not require assistance.    Physical Therapy Goals  Initiated 3/19/2025  Pt stated goal: go home today  Pt will be I with LE HEP in 7 days.  Pt will perform bed mobility with [place] in 7 days.  Pt will perform transfers with [place] in 7 days.   Pt will amb 100 feet with LRAD safely with [place] in 7 days.  Pt will ascend/descend 3 steps with 1 handrail(s) and Supervision in 7 days to safely enter/navigate home.   Pt will demonstrate improvement in dynamic balance from CGA to supervision in 7 days.     3/23/2025 1457 by Suzanne Rodrgiuez, POLINA  Outcome: Progressing     
  Problem: Safety - Adult  Goal: Free from fall injury  Outcome: Progressing     Problem: Respiratory - Adult  Goal: Achieves optimal ventilation and oxygenation  Outcome: Progressing     Problem: Cardiovascular - Adult  Goal: Maintains optimal cardiac output and hemodynamic stability  Outcome: Progressing     Problem: Physical Therapy - Adult  Goal: By Discharge: Performs mobility at highest level of function for planned discharge setting.  See evaluation for individualized goals.  Description: FUNCTIONAL STATUS PRIOR TO ADMISSION: Patient was independent and active without use of DME.    HOME SUPPORT PRIOR TO ADMISSION: The patient lives with wife but did not require assistance.    Physical Therapy Goals  Initiated 3/19/2025  Pt stated goal: go home today  Pt will be I with LE HEP in 7 days.  Pt will perform bed mobility with [place] in 7 days.  Pt will perform transfers with [place] in 7 days.   Pt will amb 100 feet with LRAD safely with [place] in 7 days.  Pt will ascend/descend 3 steps with 1 handrail(s) and Supervision in 7 days to safely enter/navigate home.   Pt will demonstrate improvement in dynamic balance from CGA to supervision in 7 days.     3/21/2025 1222 by Maryjo Little, PT  Outcome: Progressing     
  Problem: Safety - Adult  Goal: Free from fall injury  Outcome: Progressing     Problem: Respiratory - Adult  Goal: Achieves optimal ventilation and oxygenation  Outcome: Progressing     Problem: Cardiovascular - Adult  Goal: Maintains optimal cardiac output and hemodynamic stability  Outcome: Progressing  Goal: Absence of cardiac dysrhythmias or at baseline  Outcome: Progressing     Problem: Musculoskeletal - Adult  Goal: Return mobility to safest level of function  Outcome: Progressing  Goal: Maintain proper alignment of affected body part  Outcome: Progressing  Goal: Return ADL status to a safe level of function  Outcome: Progressing     Problem: Skin/Tissue Integrity  Goal: Skin integrity remains intact  Description: 1.  Monitor for areas of redness and/or skin breakdown  2.  Assess vascular access sites hourly  3.  Every 4-6 hours minimum:  Change oxygen saturation probe site  4.  Every 4-6 hours:  If on nasal continuous positive airway pressure, respiratory therapy assess nares and determine need for appliance change or resting period  Outcome: Progressing     Problem: Chronic Conditions and Co-morbidities  Goal: Patient's chronic conditions and co-morbidity symptoms are monitored and maintained or improved  Outcome: Progressing     Problem: Nutrition Deficit:  Goal: Optimize nutritional status  Outcome: Progressing     
OCCUPATIONAL THERAPY EVALUATION  Patient: Lei Jaimes (79 y.o. male)  Date: 3/18/2025  Primary Diagnosis: Upper GI bleed [K92.2]  GIB (gastrointestinal bleeding) [K92.2]  Procedure(s) (LRB):  ESOPHAGOGASTRODUODENOSCOPY (N/A) 4 Days Post-Op   Precautions: Fall Risk                Recommendations for nursing mobility: Out of bed to chair for meals, Use of bed/chair alarm for safety, Use of BSC for toileting , AD and gt belt for bed to chair , and Assist x1    In place during session:High Flow 45L/min 45%, External Catheter, EKG/telemetry , and Pulse ox  ASSESSMENT  Pt is a 79 y.o. 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 Pt received semi-supine in bed upon arrival, AXO x4, and agreeable to OT evaluation.     Based on current observations, pt presents with decreased  functional mobility, independence in ADLs, high-level IADLs, strength, activity tolerance, endurance, balance (see below for objective details and assist levels).     Overall, pt tolerates session fair with inc fatigue and SOB with activity, Spo2 maintaining >92% t/o session and highest HR seen at 115 bpm. Pt initially on bed pan, rolling b/l in bed with min/CGA and requiring TA for posterior ismael care. Pt then transferring from semi supine to sitting up at EOB with CGA. Pt brushing teeth and washing face with set up A. Pt transferring into standing with cues for safe hand placement and min A. Pt able to take 3 forwards/backwards steps, then returning back to sitting EOB. Pt deferring sitting up in recliner chair at this time but states he would sit up after he eats his lunch. Pt transferring back into semi supine position with CGA. Pt with 
OCCUPATIONAL THERAPY TREATMENT  Patient: Lei Jaimes (79 y.o. male)  Date: 3/20/2025  Primary Diagnosis: Upper GI bleed [K92.2]  GIB (gastrointestinal bleeding) [K92.2]  Procedure(s) (LRB):  ESOPHAGOGASTRODUODENOSCOPY (N/A) 6 Days Post-Op   Precautions: Fall Risk                Recommendations for nursing mobility: Out of bed to chair for meals, Encourage HEP in prep for ADLs/mobility; see handout for details, AD and gt belt for bed to chair , Amb to bathroom with AD and gait belt, and Assist x1    In place during session: Peripheral IV, External Catheter, and EKG/telemetry   Chart, occupational therapy assessment, plan of care, and goals were reviewed.  ASSESSMENT  Patient continues with skilled OT services and is progressing towards goals. Pt semi supine in bed upon [facility] arrival, agreeable to session. Pt A&O x 4. Pt completed bed mobility w/ overall sba.  Pt completed light grooming w/ set up and UE therex seated eob w/ good sitting balance. Pt completed UE therex, see grid below for details, to maintain/ increase strength and endurance to aid in adl performance. Education provided on energy conservation, safety awareness ans HEP w/ pt verbalizing good understanding. Pt left seated eob with all known needs met. (See below for objective details and assist levels).     Overall pt tolerated session fair today with rest breaks. Pt pleasant and appears very motivated to increase functional level to return to PLOF. Current OT recommendations for discharge Intermittent occupational therapy up to 2-3x/week in previous living setting. Will continue to benefit from skilled OT services, and will continue to progress as tolerated.      Start of Session End of Session   SPO2 (%) 91 93   Heart Rate (BPM) 97 95     GOALS:    Problem: Occupational Therapy - Adult  Goal: By Discharge: Performs self-care activities at highest level of function for planned discharge setting.  See evaluation for individualized goals.  Description: 
PHYSICAL THERAPY REEVALUATION  Patient: Lei Jaimes (79 y.o. male)  Date: 3/27/2025  Primary Diagnosis: Upper GI bleed [K92.2]  GIB (gastrointestinal bleeding) [K92.2]  Procedure(s) (LRB):  ESOPHAGOGASTRODUODENOSCOPY (N/A) 13 Days Post-Op   Precautions: Fall Risk                      Recommendations for nursing mobility: Out of bed to chair for meals, Amb to bathroom with AD and gait belt, and Amb in hallway    In place during session: Peripheral IV, External Catheter, and EKG/telemetry   Chart, physical therapy assessment, plan of care, and goals were reviewed.      ASSESSMENT  Patient initially seen for PT evaluation 3/19/25 and 3 skilled PT sessions since evalution. Patient seen today for PT reevaluation s/t transferred to [facility] ICU for further evaluation by GI for upper GI bleed and cardiology for NSTEMI . Patient A&O x4. Pt supine in bed upon arrival, agreeable to session. Wife present at end of session     Based on the objective data described, the patient currently presents with impaired functional mobility, decreased independence in ADLs, impaired strength, decreased activity tolerance, and impaired balance. (See below for objective details and assist levels).    Overall pt tolerated session Good today, currently with stable SpO2 on RA with short distance mobility, limited by fatigue, though highly motivated to participate with PT to improve mobility and return to PLOF. Pt will benefit from continued skilled PT to address above deficits and return to PLOF, PT goals and POC reviewed on this date and continue to remain appropriate at this time. Potential barriers for safe discharge:  none Current PT DC recommendation Continue to assess pending progress once medically appropriate.      Start of Session End of Session   SPO2 (%) 97 95   Heart Rate (BPM) 101 106     GOALS:    Problem: Physical Therapy - Adult  Goal: By Discharge: Performs mobility at highest level of function for planned discharge setting.  See 
Pt attempted to see patient for skilled therapy session however, upon entry into room patient reporting that he didn't feel like working with therapy today. Max education on benefit of working with therapy and encouragement for getting out of bed to build strength. Patient continued to politely decline. Will continue to follow patient and attempt skilled therapy session at a later time and/or date as appropriate. Wife present in room.   
[] 3   [x] 4   2.  Sitting down on and standing up from a chair with arms ( e.g., wheelchair, bedside commode, etc.)   [] 1   [] 2   [x] 3   [] 4   3.  Moving from lying on back to sitting on the side of the bed?   [] 1   [] 2   [] 3   [x] 4          How much help from another person does the patient currently need... Total A Lot A Little None   4.  Moving to and from a bed to a chair (including a wheelchair)?   [] 1   [] 2   [x] 3   [] 4   5.  Need to walk in hospital room?   [] 1   [] 2   [x] 3   [] 4   6.  Climbing 3-5 steps with a railing?   [] 1   [] 2   [x] 3   [] 4   © 2007, Trustees of [facility], under license to [employer]. All rights reserved     Score:  Initial: 20/24 Most Recent: X (Date: 3/19/2025 )   Interpretation of Tool:  Represents activities that are increasingly more difficult (i.e. Bed mobility, Transfers, Gait).  Score 24 23 22-20 19-15 14-10 9-7 6   Modifier CH CI CJ CK CL CM CN         Physical Therapy Evaluation Charge Determination   History Examination Presentation Decision-Making   HIGH Complexity :3+ comorbidities / personal factors will impact the outcome/ POC  MEDIUM Complexity : 3 Standardized tests and measures addressing body structure, function, activity limitation and / or participation in recreation  HIGH Complexity : Unstable and unpredictable characteristics  Other outcome measures [facility] 6  MEDIUM      Based on the above components, the patient evaluation is determined to be of the following complexity level: HIGH    Pain Rating:  Pt did not report pain in PT session    Pain Intervention(s):       Activity Tolerance:   Good    After treatment patient left in no apparent distress:   Bed locked and in lowest position Patient left in no apparent distress sitting up in chair, Call bell within reach, and Caregiver / family present and nsg updated.    COMMUNICATION/EDUCATION:   The patient’s plan of care was discussed with: Occupational therapist and Registered

## 2025-04-06 LAB
BACTERIA SPEC CULT: ABNORMAL
BACTERIA SPEC CULT: ABNORMAL
GRAM STN SPEC: ABNORMAL
GRAM STN SPEC: ABNORMAL
Lab: ABNORMAL

## 2025-04-07 ENCOUNTER — RESULTS FOLLOW-UP (OUTPATIENT)
Age: 80
End: 2025-04-07

## 2025-04-07 NOTE — RESULT ENCOUNTER NOTE
Patient with buttock wound while inpatient. Enterococcus isolated but likely represents colonization.  Patient was discharged on Levaquin.

## 2025-04-11 ENCOUNTER — APPOINTMENT (OUTPATIENT)
Facility: HOSPITAL | Age: 80
End: 2025-04-11
Payer: MEDICARE

## 2025-04-11 ENCOUNTER — HOSPITAL ENCOUNTER (EMERGENCY)
Facility: HOSPITAL | Age: 80
Discharge: HOME | End: 2025-04-11
Payer: MEDICARE

## 2025-04-11 VITALS
TEMPERATURE: 97 F | BODY MASS INDEX: 22.12 KG/M2 | RESPIRATION RATE: 18 BRPM | HEIGHT: 71 IN | WEIGHT: 158 LBS | DIASTOLIC BLOOD PRESSURE: 55 MMHG | OXYGEN SATURATION: 98 % | HEART RATE: 98 BPM | SYSTOLIC BLOOD PRESSURE: 112 MMHG

## 2025-04-11 DIAGNOSIS — R60.0 BILATERAL LOWER EXTREMITY EDEMA: ICD-10-CM

## 2025-04-11 DIAGNOSIS — I73.9 PAD (PERIPHERAL ARTERY DISEASE): ICD-10-CM

## 2025-04-11 DIAGNOSIS — D64.9 CHRONIC ANEMIA: ICD-10-CM

## 2025-04-11 DIAGNOSIS — N18.9 CHRONIC KIDNEY DISEASE, UNSPECIFIED CKD STAGE: ICD-10-CM

## 2025-04-11 DIAGNOSIS — R60.0 BILATERAL EDEMA OF LOWER EXTREMITY: Primary | ICD-10-CM

## 2025-04-11 LAB
ALBUMIN SERPL-MCNC: 2.8 G/DL (ref 3.5–5)
ALBUMIN/GLOB SERPL: 0.8 (ref 1.1–2.2)
ALP SERPL-CCNC: 79 U/L (ref 45–117)
ALT SERPL-CCNC: 32 U/L (ref 12–78)
ANION GAP SERPL CALC-SCNC: 11 MMOL/L (ref 2–12)
AST SERPL W P-5'-P-CCNC: 39 U/L (ref 15–37)
BASOPHILS # BLD: 0.03 K/UL (ref 0–0.1)
BASOPHILS NFR BLD: 0.3 % (ref 0–1)
BILIRUB SERPL-MCNC: 1 MG/DL (ref 0.2–1)
BUN SERPL-MCNC: 52 MG/DL (ref 6–20)
BUN/CREAT SERPL: 20 (ref 12–20)
CA-I BLD-MCNC: 8.7 MG/DL (ref 8.5–10.1)
CHLORIDE SERPL-SCNC: 105 MMOL/L (ref 97–108)
CO2 SERPL-SCNC: 23 MMOL/L (ref 21–32)
CREAT SERPL-MCNC: 2.57 MG/DL (ref 0.7–1.3)
DIFFERENTIAL METHOD BLD: ABNORMAL
ECHO BSA: 1.89 M2
ECHO BSA: 1.89 M2
EKG ATRIAL RATE: 101 BPM
EKG DIAGNOSIS: NORMAL
EKG P AXIS: 75 DEGREES
EKG P-R INTERVAL: 192 MS
EKG Q-T INTERVAL: 375 MS
EKG QRS DURATION: 89 MS
EKG QTC CALCULATION (BAZETT): 487 MS
EKG R AXIS: 108 DEGREES
EKG T AXIS: 147 DEGREES
EKG VENTRICULAR RATE: 101 BPM
EOSINOPHIL # BLD: 0.11 K/UL (ref 0–0.4)
EOSINOPHIL NFR BLD: 1.3 % (ref 0–7)
ERYTHROCYTE [DISTWIDTH] IN BLOOD BY AUTOMATED COUNT: 15.8 % (ref 11.5–14.5)
GLOBULIN SER CALC-MCNC: 3.6 G/DL (ref 2–4)
GLUCOSE SERPL-MCNC: 140 MG/DL (ref 65–100)
HCT VFR BLD AUTO: 29.5 % (ref 36.6–50.3)
HGB BLD-MCNC: 9.2 G/DL (ref 12.1–17)
IMM GRANULOCYTES # BLD AUTO: 0.03 K/UL (ref 0–0.04)
IMM GRANULOCYTES NFR BLD AUTO: 0.3 % (ref 0–0.5)
LYMPHOCYTES # BLD: 0.97 K/UL (ref 0.8–3.5)
LYMPHOCYTES NFR BLD: 11.2 % (ref 12–49)
MCH RBC QN AUTO: 32.1 PG (ref 26–34)
MCHC RBC AUTO-ENTMCNC: 31.2 G/DL (ref 30–36.5)
MCV RBC AUTO: 102.8 FL (ref 80–99)
MONOCYTES # BLD: 1.37 K/UL (ref 0–1)
MONOCYTES NFR BLD: 15.8 % (ref 5–13)
NEUTS SEG # BLD: 6.18 K/UL (ref 1.8–8)
NEUTS SEG NFR BLD: 71.1 % (ref 32–75)
NRBC # BLD: 0 K/UL (ref 0–0.01)
NRBC BLD-RTO: 0 PER 100 WBC
PLATELET # BLD AUTO: 166 K/UL (ref 150–400)
PMV BLD AUTO: 11.1 FL (ref 8.9–12.9)
POTASSIUM SERPL-SCNC: 4.6 MMOL/L (ref 3.5–5.1)
PROT SERPL-MCNC: 6.4 G/DL (ref 6.4–8.2)
RBC # BLD AUTO: 2.87 M/UL (ref 4.1–5.7)
SODIUM SERPL-SCNC: 139 MMOL/L (ref 136–145)
VAS LEFT ABI: 0.68
VAS LEFT ARM BP: 109 MMHG
VAS LEFT PTA BP: 84 MMHG
VAS RIGHT ABI: 0.77
VAS RIGHT ARM BP: 124 MMHG
VAS RIGHT PTA BP: 96 MMHG
WBC # BLD AUTO: 8.7 K/UL (ref 4.1–11.1)

## 2025-04-11 PROCEDURE — 85025 COMPLETE CBC W/AUTO DIFF WBC: CPT

## 2025-04-11 PROCEDURE — 71045 X-RAY EXAM CHEST 1 VIEW: CPT

## 2025-04-11 PROCEDURE — 93970 EXTREMITY STUDY: CPT

## 2025-04-11 PROCEDURE — 80053 COMPREHEN METABOLIC PANEL: CPT

## 2025-04-11 PROCEDURE — 99285 EMERGENCY DEPT VISIT HI MDM: CPT

## 2025-04-11 PROCEDURE — 93922 UPR/L XTREMITY ART 2 LEVELS: CPT

## 2025-04-11 PROCEDURE — 36415 COLL VENOUS BLD VENIPUNCTURE: CPT

## 2025-04-11 PROCEDURE — 93005 ELECTROCARDIOGRAM TRACING: CPT | Performed by: NURSE PRACTITIONER

## 2025-04-11 ASSESSMENT — PAIN SCALES - GENERAL: PAINLEVEL_OUTOF10: 0

## 2025-04-11 ASSESSMENT — PAIN - FUNCTIONAL ASSESSMENT: PAIN_FUNCTIONAL_ASSESSMENT: 0-10

## 2025-04-11 NOTE — ED PROVIDER NOTES
Globulin 3.6 2.0 - 4.0 g/dL    Albumin/Globulin Ratio 0.8 (L) 1.1 - 2.2     Vascular duplex lower extremity venous bilateral    Collection Time: 04/11/25  5:45 PM   Result Value Ref Range    Body Surface Area 1.89 m2   Vascular ankle brachial index (JAYLEEN) PROCEDURE ORDER DONE IN [facility]    Collection Time: 04/11/25  6:25 PM   Result Value Ref Range    Body Surface Area 1.89 m2    Right arm  mmHg    Right posterior tibial 96 mmHg    Right JAYLEEN 0.77     Left arm  mmHg    Left posterior tibial 84 mmHg    Left JAYLEEN 0.68        EKG:.EKG interpreted by Dr. Nunes, Shows ST Rate 101 No STEMI    Radiologic Studies:  Radiographic images are visualized and preliminarily interpreted by the ED Provider with the following findings: See ED Course Below.     Interpretation per the Radiologist below, if available at the time of this note:  Vascular ankle brachial index (JAYLEEN) PROCEDURE ORDER DONE IN [facility]         XR CHEST PORTABLE   Final Result      Bilateral opacities versus atelectasis.      Bilateral pleural effusions.      Electronically signed by Adi Taylor      Vascular duplex lower extremity venous bilateral            MEDICAL DECISION MAKING and ED COURSE   6:33 PM Differential and Considerations of tests not ordered: Presents with ankle and pedal edema bilaterally, right greater than left and pitting. Given his recent hospitalization, decreased mobility and multiple comorbidities, will need to rule out DVT. Will obtain duplex bilaterally. Although no other complaints by patient it is reasonable to obtain CBC to rule out infection or anemia above his baseline, chemistry to assess his renal function for SARA on CKD, albumin, electrolytes, glucose. Considered additional diagnostics: No chest pain requiring troponin and will likely be elevated given his recent history of NSTEMI and BNP will likely be elevated given his chronic kidney disease and CHF but will obtain EKG and CXR to r/o returning PNA or fluid  grammatical, syntax, homophones, and other interpretive errors are inadvertently transcribed by the computer software. Please disregards these errors. Please excuse any errors that have escaped final proofreading.)     Terrie Elizalde, DAWSON - NP  04/11/25 4766

## 2025-04-14 LAB
EKG ATRIAL RATE: 101 BPM
EKG DIAGNOSIS: NORMAL
EKG P AXIS: 75 DEGREES
EKG P-R INTERVAL: 192 MS
EKG Q-T INTERVAL: 375 MS
EKG QRS DURATION: 89 MS
EKG QTC CALCULATION (BAZETT): 487 MS
EKG R AXIS: 108 DEGREES
EKG T AXIS: 147 DEGREES
EKG VENTRICULAR RATE: 101 BPM

## 2025-05-02 ENCOUNTER — TELEPHONE (OUTPATIENT)
Age: 80
End: 2025-05-02

## 2025-05-02 NOTE — TELEPHONE ENCOUNTER
Patient called would like you to review his last hospital stay to see if he would need to see you in office.

## 2025-05-18 DIAGNOSIS — K21.00 GASTROESOPHAGEAL REFLUX DISEASE WITH ESOPHAGITIS WITHOUT HEMORRHAGE: ICD-10-CM

## 2025-05-20 RX ORDER — PANTOPRAZOLE SODIUM 40 MG/1
40 TABLET, DELAYED RELEASE ORAL DAILY
Qty: 90 TABLET | Refills: 3 | Status: SHIPPED | OUTPATIENT
Start: 2025-05-20

## 2025-07-03 ENCOUNTER — APPOINTMENT (OUTPATIENT)
Facility: HOSPITAL | Age: 80
End: 2025-07-03
Payer: MEDICARE

## 2025-07-03 ENCOUNTER — HOSPITAL ENCOUNTER (EMERGENCY)
Facility: HOSPITAL | Age: 80
Discharge: ANOTHER HOSPITAL | End: 2025-07-03
Attending: EMERGENCY MEDICINE
Payer: MEDICARE

## 2025-07-03 ENCOUNTER — HOSPITAL ENCOUNTER (INPATIENT)
Facility: HOSPITAL | Age: 80
LOS: 2 days | DRG: 871 | End: 2025-07-05
Attending: HOSPITALIST | Admitting: HOSPITALIST
Payer: MEDICARE

## 2025-07-03 ENCOUNTER — APPOINTMENT (OUTPATIENT)
Facility: HOSPITAL | Age: 80
End: 2025-07-03
Attending: EMERGENCY MEDICINE
Payer: MEDICARE

## 2025-07-03 VITALS
TEMPERATURE: 98 F | HEART RATE: 84 BPM | RESPIRATION RATE: 27 BRPM | HEIGHT: 72 IN | WEIGHT: 163 LBS | SYSTOLIC BLOOD PRESSURE: 114 MMHG | DIASTOLIC BLOOD PRESSURE: 47 MMHG | BODY MASS INDEX: 22.08 KG/M2 | OXYGEN SATURATION: 94 %

## 2025-07-03 DIAGNOSIS — R65.21 SEPTIC SHOCK (HCC): ICD-10-CM

## 2025-07-03 DIAGNOSIS — R57.9 SHOCK (HCC): Primary | ICD-10-CM

## 2025-07-03 DIAGNOSIS — I21.4 NSTEMI (NON-ST ELEVATED MYOCARDIAL INFARCTION) (HCC): ICD-10-CM

## 2025-07-03 DIAGNOSIS — N40.0 PROSTATE HYPERTROPHY: ICD-10-CM

## 2025-07-03 DIAGNOSIS — R74.01 TRANSAMINITIS: ICD-10-CM

## 2025-07-03 DIAGNOSIS — N17.9 ACUTE RENAL FAILURE, UNSPECIFIED ACUTE RENAL FAILURE TYPE: Primary | ICD-10-CM

## 2025-07-03 DIAGNOSIS — A41.9 SEPTIC SHOCK (HCC): ICD-10-CM

## 2025-07-03 DIAGNOSIS — J90 PLEURAL EFFUSION: ICD-10-CM

## 2025-07-03 LAB
ALBUMIN SERPL-MCNC: 2.9 G/DL (ref 3.5–5)
ALBUMIN/GLOB SERPL: 0.9 (ref 1.1–2.2)
ALP SERPL-CCNC: 371 U/L (ref 45–117)
ALT SERPL-CCNC: 988 U/L (ref 12–78)
AMMONIA PLAS-SCNC: <10 UMOL/L
ANION GAP SERPL CALC-SCNC: 20 MMOL/L (ref 2–12)
AST SERPL W P-5'-P-CCNC: 983 U/L (ref 15–37)
BASOPHILS # BLD: 0 K/UL (ref 0–0.1)
BASOPHILS NFR BLD: 0 % (ref 0–1)
BILIRUB SERPL-MCNC: 6.5 MG/DL (ref 0.2–1)
BUN SERPL-MCNC: 127 MG/DL (ref 6–20)
BUN/CREAT SERPL: 24 (ref 12–20)
CA-I BLD-MCNC: 9 MG/DL (ref 8.5–10.1)
CHLORIDE SERPL-SCNC: 96 MMOL/L (ref 97–108)
CO2 SERPL-SCNC: 19 MMOL/L (ref 21–32)
COMMENT:: NORMAL
COMMENT:: NORMAL
CREAT SERPL-MCNC: 5.2 MG/DL (ref 0.7–1.3)
DIFFERENTIAL METHOD BLD: ABNORMAL
EOSINOPHIL # BLD: 0 K/UL (ref 0–0.4)
EOSINOPHIL NFR BLD: 0 % (ref 0–7)
ERYTHROCYTE [DISTWIDTH] IN BLOOD BY AUTOMATED COUNT: 20.3 % (ref 11.5–14.5)
GLOBULIN SER CALC-MCNC: 3.4 G/DL (ref 2–4)
GLUCOSE SERPL-MCNC: 97 MG/DL (ref 65–100)
HCT VFR BLD AUTO: 43.3 % (ref 36.6–50.3)
HGB BLD-MCNC: 14.6 G/DL (ref 12.1–17)
IMM GRANULOCYTES # BLD AUTO: 0.07 K/UL (ref 0–0.04)
IMM GRANULOCYTES NFR BLD AUTO: 0.6 % (ref 0–0.5)
LACTATE SERPL-SCNC: 4.2 MMOL/L (ref 0.4–2)
LACTATE SERPL-SCNC: 4.6 MMOL/L (ref 0.4–2)
LYMPHOCYTES # BLD: 0.16 K/UL (ref 0.8–3.5)
LYMPHOCYTES NFR BLD: 1.4 % (ref 12–49)
MCH RBC QN AUTO: 30.6 PG (ref 26–34)
MCHC RBC AUTO-ENTMCNC: 33.7 G/DL (ref 30–36.5)
MCV RBC AUTO: 90.8 FL (ref 80–99)
MONOCYTES # BLD: 1.25 K/UL (ref 0–1)
MONOCYTES NFR BLD: 11.3 % (ref 5–13)
NEUTS SEG # BLD: 9.62 K/UL (ref 1.8–8)
NEUTS SEG NFR BLD: 86.7 % (ref 32–75)
NRBC # BLD: 0.26 K/UL (ref 0–0.01)
NRBC BLD-RTO: 2.3 PER 100 WBC
PLATELET # BLD AUTO: 115 K/UL (ref 150–400)
PMV BLD AUTO: ABNORMAL FL (ref 8.9–12.9)
POTASSIUM SERPL-SCNC: 4.9 MMOL/L (ref 3.5–5.1)
PROCALCITONIN SERPL-MCNC: 2.82 NG/ML
PROT SERPL-MCNC: 6.3 G/DL (ref 6.4–8.2)
RBC # BLD AUTO: 4.77 M/UL (ref 4.1–5.7)
RBC MORPH BLD: ABNORMAL
SODIUM SERPL-SCNC: 135 MMOL/L (ref 136–145)
SPECIMEN HOLD: NORMAL
SPECIMEN HOLD: NORMAL
TROPONIN I SERPL HS-MCNC: 461 NG/L (ref 0–76)
TROPONIN I SERPL HS-MCNC: 512 NG/L (ref 0–76)
WBC # BLD AUTO: 11.1 K/UL (ref 4.1–11.1)

## 2025-07-03 PROCEDURE — 36415 COLL VENOUS BLD VENIPUNCTURE: CPT

## 2025-07-03 PROCEDURE — 93005 ELECTROCARDIOGRAM TRACING: CPT

## 2025-07-03 PROCEDURE — 87040 BLOOD CULTURE FOR BACTERIA: CPT

## 2025-07-03 PROCEDURE — 83605 ASSAY OF LACTIC ACID: CPT

## 2025-07-03 PROCEDURE — 0202U NFCT DS 22 TRGT SARS-COV-2: CPT

## 2025-07-03 PROCEDURE — 2000000000 HC ICU R&B

## 2025-07-03 PROCEDURE — 6360000002 HC RX W HCPCS: Performed by: EMERGENCY MEDICINE

## 2025-07-03 PROCEDURE — 6360000002 HC RX W HCPCS: Performed by: NURSE PRACTITIONER

## 2025-07-03 PROCEDURE — 96367 TX/PROPH/DG ADDL SEQ IV INF: CPT

## 2025-07-03 PROCEDURE — 96361 HYDRATE IV INFUSION ADD-ON: CPT

## 2025-07-03 PROCEDURE — 85025 COMPLETE CBC W/AUTO DIFF WBC: CPT

## 2025-07-03 PROCEDURE — 71045 X-RAY EXAM CHEST 1 VIEW: CPT

## 2025-07-03 PROCEDURE — 84484 ASSAY OF TROPONIN QUANT: CPT

## 2025-07-03 PROCEDURE — 80053 COMPREHEN METABOLIC PANEL: CPT

## 2025-07-03 PROCEDURE — 71250 CT THORAX DX C-: CPT

## 2025-07-03 PROCEDURE — 82140 ASSAY OF AMMONIA: CPT

## 2025-07-03 PROCEDURE — 96365 THER/PROPH/DIAG IV INF INIT: CPT

## 2025-07-03 PROCEDURE — 84145 PROCALCITONIN (PCT): CPT

## 2025-07-03 PROCEDURE — 96366 THER/PROPH/DIAG IV INF ADDON: CPT

## 2025-07-03 PROCEDURE — 74176 CT ABD & PELVIS W/O CONTRAST: CPT

## 2025-07-03 PROCEDURE — 2580000003 HC RX 258: Performed by: EMERGENCY MEDICINE

## 2025-07-03 PROCEDURE — 51702 INSERT TEMP BLADDER CATH: CPT

## 2025-07-03 PROCEDURE — 99285 EMERGENCY DEPT VISIT HI MDM: CPT

## 2025-07-03 PROCEDURE — 81001 URINALYSIS AUTO W/SCOPE: CPT

## 2025-07-03 RX ORDER — ACETAMINOPHEN 650 MG/1
650 SUPPOSITORY RECTAL EVERY 6 HOURS PRN
Status: DISCONTINUED | OUTPATIENT
Start: 2025-07-03 | End: 2025-07-05 | Stop reason: HOSPADM

## 2025-07-03 RX ORDER — ALBUTEROL SULFATE 0.83 MG/ML
2.5 SOLUTION RESPIRATORY (INHALATION)
Status: DISCONTINUED | OUTPATIENT
Start: 2025-07-03 | End: 2025-07-04

## 2025-07-03 RX ORDER — 0.9 % SODIUM CHLORIDE 0.9 %
30 INTRAVENOUS SOLUTION INTRAVENOUS ONCE
Status: COMPLETED | OUTPATIENT
Start: 2025-07-03 | End: 2025-07-03

## 2025-07-03 RX ORDER — ONDANSETRON 4 MG/1
4 TABLET, ORALLY DISINTEGRATING ORAL EVERY 8 HOURS PRN
Status: DISCONTINUED | OUTPATIENT
Start: 2025-07-03 | End: 2025-07-05 | Stop reason: HOSPADM

## 2025-07-03 RX ORDER — METRONIDAZOLE 500 MG/100ML
500 INJECTION, SOLUTION INTRAVENOUS EVERY 8 HOURS
Status: DISCONTINUED | OUTPATIENT
Start: 2025-07-03 | End: 2025-07-05

## 2025-07-03 RX ORDER — WATER 10 ML/10ML
INJECTION INTRAMUSCULAR; INTRAVENOUS; SUBCUTANEOUS
Status: DISCONTINUED
Start: 2025-07-03 | End: 2025-07-03 | Stop reason: HOSPADM

## 2025-07-03 RX ORDER — VANCOMYCIN 2 G/400ML
25 INJECTION, SOLUTION INTRAVENOUS ONCE
Status: COMPLETED | OUTPATIENT
Start: 2025-07-03 | End: 2025-07-03

## 2025-07-03 RX ORDER — SODIUM CHLORIDE 0.9 % (FLUSH) 0.9 %
5-40 SYRINGE (ML) INJECTION PRN
Status: DISCONTINUED | OUTPATIENT
Start: 2025-07-03 | End: 2025-07-05 | Stop reason: HOSPADM

## 2025-07-03 RX ORDER — 0.9 % SODIUM CHLORIDE 0.9 %
1000 INTRAVENOUS SOLUTION INTRAVENOUS ONCE
Status: COMPLETED | OUTPATIENT
Start: 2025-07-03 | End: 2025-07-03

## 2025-07-03 RX ORDER — SODIUM CHLORIDE 0.9 % (FLUSH) 0.9 %
5-40 SYRINGE (ML) INJECTION EVERY 12 HOURS SCHEDULED
Status: DISCONTINUED | OUTPATIENT
Start: 2025-07-03 | End: 2025-07-05 | Stop reason: HOSPADM

## 2025-07-03 RX ORDER — ONDANSETRON 2 MG/ML
4 INJECTION INTRAMUSCULAR; INTRAVENOUS EVERY 6 HOURS PRN
Status: DISCONTINUED | OUTPATIENT
Start: 2025-07-03 | End: 2025-07-05 | Stop reason: HOSPADM

## 2025-07-03 RX ORDER — BUDESONIDE 0.5 MG/2ML
0.5 INHALANT ORAL
Status: DISCONTINUED | OUTPATIENT
Start: 2025-07-03 | End: 2025-07-05

## 2025-07-03 RX ORDER — SODIUM CHLORIDE 9 MG/ML
INJECTION, SOLUTION INTRAVENOUS PRN
Status: DISCONTINUED | OUTPATIENT
Start: 2025-07-03 | End: 2025-07-05 | Stop reason: HOSPADM

## 2025-07-03 RX ORDER — POLYETHYLENE GLYCOL 3350 17 G/17G
17 POWDER, FOR SOLUTION ORAL DAILY PRN
Status: DISCONTINUED | OUTPATIENT
Start: 2025-07-03 | End: 2025-07-05 | Stop reason: HOSPADM

## 2025-07-03 RX ORDER — ACETAMINOPHEN 325 MG/1
650 TABLET ORAL EVERY 6 HOURS PRN
Status: DISCONTINUED | OUTPATIENT
Start: 2025-07-03 | End: 2025-07-05 | Stop reason: HOSPADM

## 2025-07-03 RX ORDER — CEFTRIAXONE 1 G/1
INJECTION, POWDER, FOR SOLUTION INTRAMUSCULAR; INTRAVENOUS
Status: DISCONTINUED
Start: 2025-07-03 | End: 2025-07-03 | Stop reason: HOSPADM

## 2025-07-03 RX ORDER — SODIUM CHLORIDE, SODIUM LACTATE, POTASSIUM CHLORIDE, CALCIUM CHLORIDE 600; 310; 30; 20 MG/100ML; MG/100ML; MG/100ML; MG/100ML
INJECTION, SOLUTION INTRAVENOUS CONTINUOUS
Status: DISCONTINUED | OUTPATIENT
Start: 2025-07-03 | End: 2025-07-03

## 2025-07-03 RX ORDER — ALBUMIN (HUMAN) 12.5 G/50ML
12.5 SOLUTION INTRAVENOUS EVERY 4 HOURS
Status: DISCONTINUED | OUTPATIENT
Start: 2025-07-03 | End: 2025-07-04

## 2025-07-03 RX ORDER — TAMSULOSIN HYDROCHLORIDE 0.4 MG/1
0.4 CAPSULE ORAL ONCE
Status: DISCONTINUED | OUTPATIENT
Start: 2025-07-03 | End: 2025-07-03 | Stop reason: HOSPADM

## 2025-07-03 RX ADMIN — CEFEPIME 2000 MG: 2 INJECTION, POWDER, FOR SOLUTION INTRAVENOUS at 15:33

## 2025-07-03 RX ADMIN — SODIUM CHLORIDE 2217 ML: 9 INJECTION, SOLUTION INTRAVENOUS at 12:10

## 2025-07-03 RX ADMIN — METRONIDAZOLE 500 MG: 500 INJECTION, SOLUTION INTRAVENOUS at 23:19

## 2025-07-03 RX ADMIN — SODIUM CHLORIDE 1000 ML: 0.9 INJECTION, SOLUTION INTRAVENOUS at 10:43

## 2025-07-03 RX ADMIN — VANCOMYCIN 2000 MG: 2 INJECTION, SOLUTION INTRAVENOUS at 11:21

## 2025-07-03 ASSESSMENT — PAIN - FUNCTIONAL ASSESSMENT: PAIN_FUNCTIONAL_ASSESSMENT: 0-10

## 2025-07-03 ASSESSMENT — PAIN SCALES - WONG BAKER: WONGBAKER_NUMERICALRESPONSE: HURTS WHOLE LOT

## 2025-07-03 ASSESSMENT — PAIN DESCRIPTION - LOCATION: LOCATION: COCCYX

## 2025-07-03 ASSESSMENT — PAIN SCALES - GENERAL: PAINLEVEL_OUTOF10: 0

## 2025-07-03 NOTE — ED NOTES
Transfer Center RN - aware pt will need ICU level care at either [facility] or [facility] with urology coverage

## 2025-07-03 NOTE — ED NOTES
Wound to sacrum observed with MD- stage 2. Pt states no home health nurse at this time. She follows the instructions for wound care given by home health nurse that did attend the wound for awhile after dc from [facility]. Pt does have pain at the wound when on back- propped up on side with pillow- pt verbalized relief

## 2025-07-03 NOTE — ED NOTES
Attempted to insert angela catheter, attempted to use size 12 and 10 [place] coude with no success, PT denies feeling the need to void, MD made aware.

## 2025-07-03 NOTE — H&P
CRITICAL CARE ADMISSION NOTE    Name: Lei Jaimes   : 1945   MRN: [id number]   Date: 7/3/2025        ICU PROBLEM LIST   Septic Shock   Asp PNA +/- UTI  Cirrhosis   SARA on CKD III  Acute Metabolic Encephalopathy     HISTORY OF PRESENT ILLNESS:   Lei Jaimes is a 79 y.o. with a PMH of BPH, dysphagia, GERD, HTN, cirrhosis & esophageal stricture who initially presented to [facility] ER on 7/3 with generalized weakness.  Patient presented there tachypneic (33), hypotensive (92/66) with laboratory workup demonstrating severe SARA (SCR 5.20), lactic acidosis (4.8), HAGMA (AG 20, CO2 19), elevated LFTs (AST 9 83/) with elevated bilirubin (6.5).  CT A/P with large bilateral pleural effusions, abnormal appearance of the prostate with mass effect on the posterior bladder, adrenal gland nodules, various renal lesions and potential liver nodularity.  Broad-spectrum antibiotics initiated and sepsis bolus ordered.  Multiple attempts made to place Shah catheter, unable to pass secondary to enlarged prostate.  Request made to transfer patient to [facility] for availability of urological services and further management of septic shock.     NEUROLOGICAL:    Acute Metabolic Encephalopathy   - In setting of sepsis and Uremia, ? 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

## 2025-07-03 NOTE — ED NOTES
Pt has been repositioned - covered wih warm blankets- pillows helping hold pt onto side due to pain due to wound  Wife notified of time of transfer and room # 469 at [facility]

## 2025-07-03 NOTE — ED NOTES
Advised MD unable to pass angela- received v/o to notify Transfer Center- possible transfer to another hospital instead of [facility]. Pt denies the need to urinate

## 2025-07-03 NOTE — ED NOTES
Critical bas received from soledad in lab- troponin 461 and Lactic acid 4.24- MD made aware. RN at bedside attempting 2nd IV

## 2025-07-03 NOTE — ED NOTES
Attempted IV access- poor venous access- Unsuccessful. Phlebot at bedside drawing labs/blood cultures. Lynn HAYES at bedside to attempt IV

## 2025-07-04 ENCOUNTER — APPOINTMENT (OUTPATIENT)
Facility: HOSPITAL | Age: 80
DRG: 871 | End: 2025-07-04
Attending: HOSPITALIST
Payer: MEDICARE

## 2025-07-04 PROBLEM — R57.9 SHOCK (HCC): Status: ACTIVE | Noted: 2025-07-03

## 2025-07-04 LAB
ALBUMIN SERPL-MCNC: 2.2 G/DL (ref 3.5–5)
ALBUMIN SERPL-MCNC: 2.6 G/DL (ref 3.5–5)
ALBUMIN SERPL-MCNC: 3 G/DL (ref 3.5–5)
ALBUMIN/GLOB SERPL: 0.9 (ref 1.1–2.2)
ALBUMIN/GLOB SERPL: 0.9 (ref 1.1–2.2)
ALBUMIN/GLOB SERPL: 1 (ref 1.1–2.2)
ALP SERPL-CCNC: 298 U/L (ref 45–117)
ALP SERPL-CCNC: 334 U/L (ref 45–117)
ALP SERPL-CCNC: 336 U/L (ref 45–117)
ALT SERPL-CCNC: 1060 U/L (ref 12–78)
ALT SERPL-CCNC: 976 U/L (ref 12–78)
ALT SERPL-CCNC: 997 U/L (ref 12–78)
AMORPH CRY URNS QL MICRO: ABNORMAL
ANION GAP SERPL CALC-SCNC: 16 MMOL/L (ref 2–12)
ANION GAP SERPL CALC-SCNC: 19 MMOL/L (ref 2–12)
ANION GAP SERPL CALC-SCNC: 21 MMOL/L (ref 2–12)
APPEARANCE UR: ABNORMAL
APTT PPP: 48.7 SEC (ref 22.1–31)
ARTERIAL PATENCY WRIST A: ABNORMAL
ARTERIAL PATENCY WRIST A: ABNORMAL
ARTERIAL PATENCY WRIST A: POSITIVE
ARTERIAL PATENCY WRIST A: POSITIVE
AST SERPL-CCNC: 1189 U/L (ref 15–37)
AST SERPL-CCNC: 1249 U/L (ref 15–37)
AST SERPL-CCNC: 1297 U/L (ref 15–37)
B PERT DNA SPEC QL NAA+PROBE: NOT DETECTED
BACTERIA URNS QL MICRO: NEGATIVE /HPF
BASE DEFICIT BLD-SCNC: 12.2 MMOL/L
BASE DEFICIT BLD-SCNC: 8.5 MMOL/L
BASE DEFICIT BLD-SCNC: 9.8 MMOL/L
BASE DEFICIT BLDA-SCNC: 8.9 MMOL/L
BASOPHILS # BLD: 0.01 K/UL (ref 0–0.1)
BASOPHILS NFR BLD: 0.1 % (ref 0–1)
BDY SITE: ABNORMAL
BILIRUB SERPL-MCNC: 6.9 MG/DL (ref 0.2–1)
BILIRUB SERPL-MCNC: 7.1 MG/DL (ref 0.2–1)
BILIRUB SERPL-MCNC: 7.7 MG/DL (ref 0.2–1)
BILIRUB UR QL CFM: NEGATIVE
BORDETELLA PARAPERTUSSIS BY PCR: NOT DETECTED
BUN SERPL-MCNC: 100 MG/DL (ref 6–20)
BUN SERPL-MCNC: 116 MG/DL (ref 6–20)
BUN SERPL-MCNC: 131 MG/DL (ref 6–20)
BUN SERPL-MCNC: 132 MG/DL (ref 6–20)
BUN SERPL-MCNC: 134 MG/DL (ref 6–20)
BUN/CREAT SERPL: 25 (ref 12–20)
BUN/CREAT SERPL: 26 (ref 12–20)
C PNEUM DNA SPEC QL NAA+PROBE: NOT DETECTED
CALCIUM SERPL-MCNC: 7.7 MG/DL (ref 8.5–10.1)
CALCIUM SERPL-MCNC: 7.7 MG/DL (ref 8.5–10.1)
CALCIUM SERPL-MCNC: 8.1 MG/DL (ref 8.5–10.1)
CALCIUM SERPL-MCNC: 8.2 MG/DL (ref 8.5–10.1)
CALCIUM SERPL-MCNC: 8.3 MG/DL (ref 8.5–10.1)
CHLORIDE SERPL-SCNC: 101 MMOL/L (ref 97–108)
CHLORIDE SERPL-SCNC: 101 MMOL/L (ref 97–108)
CHLORIDE SERPL-SCNC: 103 MMOL/L (ref 97–108)
CHLORIDE SERPL-SCNC: 105 MMOL/L (ref 97–108)
CHLORIDE SERPL-SCNC: 106 MMOL/L (ref 97–108)
CO2 SERPL-SCNC: 13 MMOL/L (ref 21–32)
CO2 SERPL-SCNC: 14 MMOL/L (ref 21–32)
CO2 SERPL-SCNC: 14 MMOL/L (ref 21–32)
CO2 SERPL-SCNC: 15 MMOL/L (ref 21–32)
CO2 SERPL-SCNC: 23 MMOL/L (ref 21–32)
COLOR UR: ABNORMAL
COMMENT:: NORMAL
CREAT SERPL-MCNC: 4.08 MG/DL (ref 0.7–1.3)
CREAT SERPL-MCNC: 4.6 MG/DL (ref 0.7–1.3)
CREAT SERPL-MCNC: 5.03 MG/DL (ref 0.7–1.3)
CREAT SERPL-MCNC: 5.23 MG/DL (ref 0.7–1.3)
CREAT SERPL-MCNC: 5.27 MG/DL (ref 0.7–1.3)
DIFFERENTIAL METHOD BLD: ABNORMAL
ECHO AO ASC DIAM: 3.8 CM
ECHO AO ASCENDING AORTA INDEX: 1.95 CM/M2
ECHO AO ROOT DIAM: 3.9 CM
ECHO AO ROOT INDEX: 2 CM/M2
ECHO AR MAX VEL PISA: 2.2 M/S
ECHO AV AREA PEAK VELOCITY: 3.5 CM2
ECHO AV AREA VTI: 3.4 CM2
ECHO AV AREA/BSA PEAK VELOCITY: 1.8 CM2/M2
ECHO AV AREA/BSA VTI: 1.7 CM2/M2
ECHO AV MEAN GRADIENT: 3 MMHG
ECHO AV MEAN VELOCITY: 0.8 M/S
ECHO AV PEAK GRADIENT: 5 MMHG
ECHO AV PEAK VELOCITY: 1.1 M/S
ECHO AV REGURGITANT PHT: 248.2 MS
ECHO AV VELOCITY RATIO: 0.73
ECHO AV VTI: 16.6 CM
ECHO BSA: 1.93 M2
ECHO EST RA PRESSURE: 3 MMHG
ECHO LA DIAMETER INDEX: 1.49 CM/M2
ECHO LA DIAMETER: 2.9 CM
ECHO LA TO AORTIC ROOT RATIO: 0.74
ECHO LA VOL A-L A2C: 78 ML (ref 18–58)
ECHO LA VOL A-L A4C: 54 ML (ref 18–58)
ECHO LA VOL BP: 60 ML (ref 18–58)
ECHO LA VOL MOD A2C: 73 ML (ref 18–58)
ECHO LA VOL MOD A4C: 48 ML (ref 18–58)
ECHO LA VOL/BSA BIPLANE: 31 ML/M2 (ref 16–34)
ECHO LA VOLUME AREA LENGTH: 66 ML
ECHO LA VOLUME INDEX A-L A2C: 40 ML/M2 (ref 16–34)
ECHO LA VOLUME INDEX A-L A4C: 28 ML/M2 (ref 16–34)
ECHO LA VOLUME INDEX AREA LENGTH: 34 ML/M2 (ref 16–34)
ECHO LA VOLUME INDEX MOD A2C: 37 ML/M2 (ref 16–34)
ECHO LA VOLUME INDEX MOD A4C: 25 ML/M2 (ref 16–34)
ECHO LV E' LATERAL VELOCITY: 3.75 CM/S
ECHO LV E' SEPTAL VELOCITY: 2.41 CM/S
ECHO LV EDV A2C: 233 ML
ECHO LV EDV A4C: 213 ML
ECHO LV EDV BP: 227 ML (ref 67–155)
ECHO LV EDV INDEX A4C: 109 ML/M2
ECHO LV EDV INDEX BP: 116 ML/M2
ECHO LV EDV NDEX A2C: 119 ML/M2
ECHO LV EF PHYSICIAN: 20 %
ECHO LV EJECTION FRACTION A2C: 17 %
ECHO LV EJECTION FRACTION A4C: 25 %
ECHO LV ESV A2C: 193 ML
ECHO LV ESV A4C: 159 ML
ECHO LV ESV BP: 183 ML (ref 22–58)
ECHO LV ESV INDEX A2C: 99 ML/M2
ECHO LV ESV INDEX A4C: 82 ML/M2
ECHO LV ESV INDEX BP: 94 ML/M2
ECHO LV FRACTIONAL SHORTENING: 9 % (ref 28–44)
ECHO LV INTERNAL DIMENSION DIASTOLE INDEX: 3.28 CM/M2
ECHO LV INTERNAL DIMENSION DIASTOLIC: 6.4 CM (ref 4.2–5.9)
ECHO LV INTERNAL DIMENSION SYSTOLIC INDEX: 2.97 CM/M2
ECHO LV INTERNAL DIMENSION SYSTOLIC: 5.8 CM
ECHO LV IVSD: 0.5 CM (ref 0.6–1)
ECHO LV MASS 2D: 119.6 G (ref 88–224)
ECHO LV MASS INDEX 2D: 61.4 G/M2 (ref 49–115)
ECHO LV POSTERIOR WALL DIASTOLIC: 0.5 CM (ref 0.6–1)
ECHO LV RELATIVE WALL THICKNESS RATIO: 0.16
ECHO LVOT AREA: 4.5 CM2
ECHO LVOT AV VTI INDEX: 0.75
ECHO LVOT DIAM: 2.4 CM
ECHO LVOT MEAN GRADIENT: 2 MMHG
ECHO LVOT PEAK GRADIENT: 3 MMHG
ECHO LVOT PEAK VELOCITY: 0.8 M/S
ECHO LVOT STROKE VOLUME INDEX: 29 ML/M2
ECHO LVOT SV: 56.5 ML
ECHO LVOT VTI: 12.5 CM
ECHO MV A VELOCITY: 0.54 M/S
ECHO MV E DECELERATION TIME (DT): 151.9 MS
ECHO MV E VELOCITY: 0.43 M/S
ECHO MV E/A RATIO: 0.8
ECHO MV E/E' LATERAL: 11.47
ECHO MV E/E' RATIO (AVERAGED): 14.65
ECHO MV E/E' SEPTAL: 17.84
ECHO MV REGURGITANT PEAK GRADIENT: 55 MMHG
ECHO MV REGURGITANT PEAK VELOCITY: 3.7 M/S
ECHO PULMONARY ARTERY END DIASTOLIC PRESSURE: 3 MMHG
ECHO PV MAX VELOCITY: 0.7 M/S
ECHO PV PEAK GRADIENT: 2 MMHG
ECHO PV REGURGITANT MAX VELOCITY: 0.9 M/S
ECHO RIGHT VENTRICULAR SYSTOLIC PRESSURE (RVSP): 30 MMHG
ECHO RV FREE WALL PEAK S': 7.6 CM/S
ECHO RV INTERNAL DIMENSION: 4.4 CM
ECHO RV TAPSE: 1.4 CM (ref 1.7–?)
ECHO TV REGURGITANT MAX VELOCITY: 2.62 M/S
ECHO TV REGURGITANT PEAK GRADIENT: 28 MMHG
EOSINOPHIL # BLD: 0 K/UL (ref 0–0.4)
EOSINOPHIL NFR BLD: 0 % (ref 0–7)
EPITH CASTS URNS QL MICRO: ABNORMAL /LPF
ERYTHROCYTE [DISTWIDTH] IN BLOOD BY AUTOMATED COUNT: 19.9 % (ref 11.5–14.5)
ERYTHROCYTE [DISTWIDTH] IN BLOOD BY AUTOMATED COUNT: 20.1 % (ref 11.5–14.5)
ERYTHROCYTE [DISTWIDTH] IN BLOOD BY AUTOMATED COUNT: 20.6 % (ref 11.5–14.5)
FIO2 ON VENT: 80 %
FLUAV SUBTYP SPEC NAA+PROBE: NOT DETECTED
FLUBV RNA SPEC QL NAA+PROBE: NOT DETECTED
GAS FLOW.O2 O2 DELIVERY SYS: ABNORMAL
GAS FLOW.O2 O2 DELIVERY SYS: ABNORMAL
GAS FLOW.O2 SETTING OXYMISER: 18 BPM
GAS FLOW.O2 SETTING OXYMISER: 20 BPM
GAS FLOW.O2 SETTING OXYMISER: 22
GLOBULIN SER CALC-MCNC: 2.5 G/DL (ref 2–4)
GLOBULIN SER CALC-MCNC: 2.9 G/DL (ref 2–4)
GLOBULIN SER CALC-MCNC: 3 G/DL (ref 2–4)
GLUCOSE BLD STRIP.AUTO-MCNC: 72 MG/DL (ref 65–117)
GLUCOSE BLD STRIP.AUTO-MCNC: 77 MG/DL (ref 65–117)
GLUCOSE SERPL-MCNC: 108 MG/DL (ref 65–100)
GLUCOSE SERPL-MCNC: 75 MG/DL (ref 65–100)
GLUCOSE SERPL-MCNC: 76 MG/DL (ref 65–100)
GLUCOSE SERPL-MCNC: 77 MG/DL (ref 65–100)
GLUCOSE SERPL-MCNC: 90 MG/DL (ref 65–100)
GLUCOSE UR STRIP.AUTO-MCNC: NEGATIVE MG/DL
HADV DNA SPEC QL NAA+PROBE: NOT DETECTED
HBV SURFACE AB SER QL: NONREACTIVE
HBV SURFACE AB SER-ACNC: 9.18 MIU/ML
HCO3 BLD-SCNC: 12.8 MMOL/L (ref 21–28)
HCO3 BLD-SCNC: 13.8 MMOL/L (ref 21–28)
HCO3 BLD-SCNC: 15.6 MMOL/L (ref 21–28)
HCO3 BLDA-SCNC: 16 MMOL/L (ref 22–26)
HCOV 229E RNA SPEC QL NAA+PROBE: NOT DETECTED
HCOV HKU1 RNA SPEC QL NAA+PROBE: NOT DETECTED
HCOV NL63 RNA SPEC QL NAA+PROBE: NOT DETECTED
HCOV OC43 RNA SPEC QL NAA+PROBE: NOT DETECTED
HCT VFR BLD AUTO: 40.4 % (ref 36.6–50.3)
HCT VFR BLD AUTO: 42.7 % (ref 36.6–50.3)
HCT VFR BLD AUTO: 45.2 % (ref 36.6–50.3)
HGB BLD-MCNC: 13.2 G/DL (ref 12.1–17)
HGB BLD-MCNC: 14 G/DL (ref 12.1–17)
HGB BLD-MCNC: 14.3 G/DL (ref 12.1–17)
HGB UR QL STRIP: ABNORMAL
HMPV RNA SPEC QL NAA+PROBE: NOT DETECTED
HPIV1 RNA SPEC QL NAA+PROBE: NOT DETECTED
HPIV2 RNA SPEC QL NAA+PROBE: NOT DETECTED
HPIV3 RNA SPEC QL NAA+PROBE: NOT DETECTED
HPIV4 RNA SPEC QL NAA+PROBE: NOT DETECTED
IMM GRANULOCYTES # BLD AUTO: 0.12 K/UL (ref 0–0.04)
IMM GRANULOCYTES # BLD AUTO: 0.12 K/UL (ref 0–0.04)
IMM GRANULOCYTES # BLD AUTO: 0.18 K/UL (ref 0–0.04)
IMM GRANULOCYTES NFR BLD AUTO: 1 % (ref 0–0.5)
IMM GRANULOCYTES NFR BLD AUTO: 1 % (ref 0–0.5)
IMM GRANULOCYTES NFR BLD AUTO: 1.2 % (ref 0–0.5)
INR PPP: 2.9 (ref 0.9–1.1)
KETONES UR QL STRIP.AUTO: ABNORMAL MG/DL
LACTATE SERPL-SCNC: 4.2 MMOL/L (ref 0.4–2)
LACTATE SERPL-SCNC: 6.4 MMOL/L (ref 0.4–2)
LACTATE SERPL-SCNC: 6.8 MMOL/L (ref 0.4–2)
LACTATE SERPL-SCNC: 7.2 MMOL/L (ref 0.4–2)
LEUKOCYTE ESTERASE UR QL STRIP.AUTO: ABNORMAL
LYMPHOCYTES # BLD: 0.12 K/UL (ref 0.8–3.5)
LYMPHOCYTES # BLD: 0.16 K/UL (ref 0.8–3.5)
LYMPHOCYTES # BLD: 0.68 K/UL (ref 0.8–3.5)
LYMPHOCYTES NFR BLD: 1 % (ref 12–49)
LYMPHOCYTES NFR BLD: 1.3 % (ref 12–49)
LYMPHOCYTES NFR BLD: 4.6 % (ref 12–49)
M PNEUMO DNA SPEC QL NAA+PROBE: NOT DETECTED
MAGNESIUM SERPL-MCNC: 2.7 MG/DL (ref 1.6–2.4)
MAGNESIUM SERPL-MCNC: 2.7 MG/DL (ref 1.6–2.4)
MCH RBC QN AUTO: 29.4 PG (ref 26–34)
MCH RBC QN AUTO: 30.1 PG (ref 26–34)
MCH RBC QN AUTO: 30.2 PG (ref 26–34)
MCHC RBC AUTO-ENTMCNC: 31.6 G/DL (ref 30–36.5)
MCHC RBC AUTO-ENTMCNC: 32.7 G/DL (ref 30–36.5)
MCHC RBC AUTO-ENTMCNC: 32.8 G/DL (ref 30–36.5)
MCV RBC AUTO: 92 FL (ref 80–99)
MCV RBC AUTO: 92 FL (ref 80–99)
MCV RBC AUTO: 93 FL (ref 80–99)
MONOCYTES # BLD: 1.31 K/UL (ref 0–1)
MONOCYTES # BLD: 1.4 K/UL (ref 0–1)
MONOCYTES # BLD: 1.41 K/UL (ref 0–1)
MONOCYTES NFR BLD: 11.4 % (ref 5–13)
MONOCYTES NFR BLD: 11.4 % (ref 5–13)
MONOCYTES NFR BLD: 9.5 % (ref 5–13)
NEUTS SEG # BLD: 10.61 K/UL (ref 1.8–8)
NEUTS SEG # BLD: 12.52 K/UL (ref 1.8–8)
NEUTS SEG # BLD: 9.9 K/UL (ref 1.8–8)
NEUTS SEG NFR BLD: 84.6 % (ref 32–75)
NEUTS SEG NFR BLD: 86.2 % (ref 32–75)
NEUTS SEG NFR BLD: 86.5 % (ref 32–75)
NITRITE UR QL STRIP.AUTO: NEGATIVE
NRBC # BLD: 0.43 K/UL (ref 0–0.01)
NRBC # BLD: 0.53 K/UL (ref 0–0.01)
NRBC # BLD: 0.62 K/UL (ref 0–0.01)
NRBC BLD-RTO: 3.8 PER 100 WBC
NRBC BLD-RTO: 4.2 PER 100 WBC
NRBC BLD-RTO: 4.3 PER 100 WBC
O2/TOTAL GAS SETTING VFR VENT: 100 %
O2/TOTAL GAS SETTING VFR VENT: 60 %
PCO2 BLD: 20.8 MMHG (ref 35–48)
PCO2 BLD: 22.2 MMHG (ref 35–48)
PCO2 BLD: 41.3 MMHG (ref 35–48)
PCO2 BLDA: 31 MMHG (ref 35–45)
PEEP RESPIRATORY: 5 CMH2O
PEEP RESPIRATORY: 8
PEEP RESPIRATORY: 8 CMH2O
PH BLD: 7.19 (ref 7.35–7.45)
PH BLD: 7.4 (ref 7.35–7.45)
PH BLD: 7.4 (ref 7.35–7.45)
PH BLDA: 7.32 (ref 7.35–7.45)
PH UR STRIP: 5 (ref 5–8)
PHOSPHATE SERPL-MCNC: 5.9 MG/DL (ref 2.6–4.7)
PHOSPHATE SERPL-MCNC: 6.2 MG/DL (ref 2.6–4.7)
PLATELET # BLD AUTO: 100 K/UL (ref 150–400)
PLATELET # BLD AUTO: 71 K/UL (ref 150–400)
PLATELET # BLD AUTO: 79 K/UL (ref 150–400)
PO2 BLD: 106 MMHG (ref 83–108)
PO2 BLD: 65 MMHG (ref 83–108)
PO2 BLD: 81 MMHG (ref 83–108)
PO2 BLDA: 103 MMHG (ref 80–100)
POTASSIUM SERPL-SCNC: 4.8 MMOL/L (ref 3.5–5.1)
POTASSIUM SERPL-SCNC: 5 MMOL/L (ref 3.5–5.1)
POTASSIUM SERPL-SCNC: 5.2 MMOL/L (ref 3.5–5.1)
PROT SERPL-MCNC: 4.7 G/DL (ref 6.4–8.2)
PROT SERPL-MCNC: 5.6 G/DL (ref 6.4–8.2)
PROT SERPL-MCNC: 5.9 G/DL (ref 6.4–8.2)
PROT UR STRIP-MCNC: 300 MG/DL
PROTHROMBIN TIME: 28.2 SEC (ref 9.2–11.2)
RBC # BLD AUTO: 4.39 M/UL (ref 4.1–5.7)
RBC # BLD AUTO: 4.64 M/UL (ref 4.1–5.7)
RBC # BLD AUTO: 4.86 M/UL (ref 4.1–5.7)
RBC #/AREA URNS HPF: ABNORMAL /HPF (ref 0–5)
RBC MORPH BLD: ABNORMAL
RSV RNA SPEC QL NAA+PROBE: NOT DETECTED
RV+EV RNA SPEC QL NAA+PROBE: NOT DETECTED
SAO2 % BLD: 92.9 % (ref 92–97)
SAO2 % BLD: 96.2 % (ref 92–97)
SAO2 % BLD: 96.5 % (ref 92–97)
SAO2 % BLD: 97 % (ref 92–97)
SAO2 % BLDMV: 89 % (ref 65–88)
SAO2 % BLDMV: 99 % (ref 65–88)
SAO2% DEVICE SAO2% SENSOR NAME: ABNORMAL
SARS-COV-2 RNA RESP QL NAA+PROBE: NOT DETECTED
SERVICE CMNT-IMP: ABNORMAL
SERVICE CMNT-IMP: NORMAL
SERVICE CMNT-IMP: NORMAL
SODIUM SERPL-SCNC: 134 MMOL/L (ref 136–145)
SODIUM SERPL-SCNC: 136 MMOL/L (ref 136–145)
SODIUM SERPL-SCNC: 138 MMOL/L (ref 136–145)
SODIUM SERPL-SCNC: 139 MMOL/L (ref 136–145)
SODIUM SERPL-SCNC: 142 MMOL/L (ref 136–145)
SP GR UR REFRACTOMETRY: 1.02 (ref 1–1.03)
SPECIMEN HOLD: NORMAL
SPECIMEN SITE: ABNORMAL
SPECIMEN TYPE: ABNORMAL
THERAPEUTIC RANGE: ABNORMAL SECS (ref 58–77)
TROPONIN I SERPL HS-MCNC: 1270 NG/L (ref 0–76)
TROPONIN I SERPL HS-MCNC: 426 NG/L (ref 0–76)
TROPONIN I SERPL HS-MCNC: 476 NG/L (ref 0–76)
URINE CULTURE IF INDICATED: ABNORMAL
UROBILINOGEN UR QL STRIP.AUTO: 0.2 EU/DL (ref 0.2–1)
VENTILATION MODE VENT: ABNORMAL
VENTILATION MODE VENT: ABNORMAL
VT SETTING VENT: 420 ML
VT SETTING VENT: 500
VT SETTING VENT: 500 ML
WBC # BLD AUTO: 11.5 K/UL (ref 4.1–11.1)
WBC # BLD AUTO: 12.3 K/UL (ref 4.1–11.1)
WBC # BLD AUTO: 14.8 K/UL (ref 4.1–11.1)
WBC URNS QL MICRO: ABNORMAL /HPF (ref 0–4)

## 2025-07-04 PROCEDURE — 85049 AUTOMATED PLATELET COUNT: CPT

## 2025-07-04 PROCEDURE — 6360000002 HC RX W HCPCS: Performed by: INTERNAL MEDICINE

## 2025-07-04 PROCEDURE — 5A1935Z RESPIRATORY VENTILATION, LESS THAN 24 CONSECUTIVE HOURS: ICD-10-PCS | Performed by: INTERNAL MEDICINE

## 2025-07-04 PROCEDURE — 84443 ASSAY THYROID STIM HORMONE: CPT

## 2025-07-04 PROCEDURE — 99223 1ST HOSP IP/OBS HIGH 75: CPT | Performed by: INTERNAL MEDICINE

## 2025-07-04 PROCEDURE — 2580000003 HC RX 258: Performed by: INTERNAL MEDICINE

## 2025-07-04 PROCEDURE — 82962 GLUCOSE BLOOD TEST: CPT

## 2025-07-04 PROCEDURE — P9047 ALBUMIN (HUMAN), 25%, 50ML: HCPCS | Performed by: NURSE PRACTITIONER

## 2025-07-04 PROCEDURE — APPSS45 APP SPLIT SHARED TIME 31-45 MINUTES: Performed by: NURSE PRACTITIONER

## 2025-07-04 PROCEDURE — 3E033XZ INTRODUCTION OF VASOPRESSOR INTO PERIPHERAL VEIN, PERCUTANEOUS APPROACH: ICD-10-PCS | Performed by: INTERNAL MEDICINE

## 2025-07-04 PROCEDURE — 85730 THROMBOPLASTIN TIME PARTIAL: CPT

## 2025-07-04 PROCEDURE — 02HV33Z INSERTION OF INFUSION DEVICE INTO SUPERIOR VENA CAVA, PERCUTANEOUS APPROACH: ICD-10-PCS | Performed by: INTERNAL MEDICINE

## 2025-07-04 PROCEDURE — 82803 BLOOD GASES ANY COMBINATION: CPT

## 2025-07-04 PROCEDURE — 94640 AIRWAY INHALATION TREATMENT: CPT

## 2025-07-04 PROCEDURE — 0BH17EZ INSERTION OF ENDOTRACHEAL AIRWAY INTO TRACHEA, VIA NATURAL OR ARTIFICIAL OPENING: ICD-10-PCS | Performed by: INTERNAL MEDICINE

## 2025-07-04 PROCEDURE — 86706 HEP B SURFACE ANTIBODY: CPT

## 2025-07-04 PROCEDURE — 6370000000 HC RX 637 (ALT 250 FOR IP): Performed by: NURSE PRACTITIONER

## 2025-07-04 PROCEDURE — 85362 FIBRIN DEGRADATION PRODUCTS: CPT

## 2025-07-04 PROCEDURE — 36600 WITHDRAWAL OF ARTERIAL BLOOD: CPT

## 2025-07-04 PROCEDURE — 4A133B1 MONITORING OF ARTERIAL PRESSURE, PERIPHERAL, PERCUTANEOUS APPROACH: ICD-10-PCS | Performed by: INTERNAL MEDICINE

## 2025-07-04 PROCEDURE — 84100 ASSAY OF PHOSPHORUS: CPT

## 2025-07-04 PROCEDURE — 85379 FIBRIN DEGRADATION QUANT: CPT

## 2025-07-04 PROCEDURE — 92610 EVALUATE SWALLOWING FUNCTION: CPT

## 2025-07-04 PROCEDURE — 93306 TTE W/DOPPLER COMPLETE: CPT

## 2025-07-04 PROCEDURE — 85610 PROTHROMBIN TIME: CPT

## 2025-07-04 PROCEDURE — 2000000000 HC ICU R&B

## 2025-07-04 PROCEDURE — 2700000000 HC OXYGEN THERAPY PER DAY

## 2025-07-04 PROCEDURE — 71045 X-RAY EXAM CHEST 1 VIEW: CPT

## 2025-07-04 PROCEDURE — 5A12012 PERFORMANCE OF CARDIAC OUTPUT, SINGLE, MANUAL: ICD-10-PCS | Performed by: INTERNAL MEDICINE

## 2025-07-04 PROCEDURE — 90945 DIALYSIS ONE EVALUATION: CPT

## 2025-07-04 PROCEDURE — 6370000000 HC RX 637 (ALT 250 FOR IP): Performed by: INTERNAL MEDICINE

## 2025-07-04 PROCEDURE — 6360000002 HC RX W HCPCS: Performed by: NURSE PRACTITIONER

## 2025-07-04 PROCEDURE — 83735 ASSAY OF MAGNESIUM: CPT

## 2025-07-04 PROCEDURE — 03HY32Z INSERTION OF MONITORING DEVICE INTO UPPER ARTERY, PERCUTANEOUS APPROACH: ICD-10-PCS | Performed by: INTERNAL MEDICINE

## 2025-07-04 PROCEDURE — 31500 INSERT EMERGENCY AIRWAY: CPT

## 2025-07-04 PROCEDURE — 3E1M39Z IRRIGATION OF PERITONEAL CAVITY USING DIALYSATE, PERCUTANEOUS APPROACH: ICD-10-PCS | Performed by: INTERNAL MEDICINE

## 2025-07-04 PROCEDURE — 93306 TTE W/DOPPLER COMPLETE: CPT | Performed by: INTERNAL MEDICINE

## 2025-07-04 PROCEDURE — 6360000002 HC RX W HCPCS

## 2025-07-04 PROCEDURE — 2500000003 HC RX 250 WO HCPCS: Performed by: NURSE PRACTITIONER

## 2025-07-04 PROCEDURE — 2500000003 HC RX 250 WO HCPCS: Performed by: INTERNAL MEDICINE

## 2025-07-04 PROCEDURE — 84439 ASSAY OF FREE THYROXINE: CPT

## 2025-07-04 PROCEDURE — 36415 COLL VENOUS BLD VENIPUNCTURE: CPT

## 2025-07-04 PROCEDURE — 37799 UNLISTED PX VASCULAR SURGERY: CPT

## 2025-07-04 PROCEDURE — 85384 FIBRINOGEN ACTIVITY: CPT

## 2025-07-04 PROCEDURE — 87340 HEPATITIS B SURFACE AG IA: CPT

## 2025-07-04 PROCEDURE — 4A133J1 MONITORING OF ARTERIAL PULSE, PERIPHERAL, PERCUTANEOUS APPROACH: ICD-10-PCS | Performed by: INTERNAL MEDICINE

## 2025-07-04 PROCEDURE — 2580000003 HC RX 258: Performed by: NURSE PRACTITIONER

## 2025-07-04 PROCEDURE — 80053 COMPREHEN METABOLIC PANEL: CPT

## 2025-07-04 PROCEDURE — 86376 MICROSOMAL ANTIBODY EACH: CPT

## 2025-07-04 PROCEDURE — 92950 HEART/LUNG RESUSCITATION CPR: CPT

## 2025-07-04 PROCEDURE — 85025 COMPLETE CBC W/AUTO DIFF WBC: CPT

## 2025-07-04 PROCEDURE — 94002 VENT MGMT INPAT INIT DAY: CPT

## 2025-07-04 PROCEDURE — 87899 AGENT NOS ASSAY W/OPTIC: CPT

## 2025-07-04 PROCEDURE — 87449 NOS EACH ORGANISM AG IA: CPT

## 2025-07-04 PROCEDURE — 94761 N-INVAS EAR/PLS OXIMETRY MLT: CPT

## 2025-07-04 PROCEDURE — 83605 ASSAY OF LACTIC ACID: CPT

## 2025-07-04 RX ORDER — INDOMETHACIN 25 MG/1
50 CAPSULE ORAL ONCE
Status: DISCONTINUED | OUTPATIENT
Start: 2025-07-04 | End: 2025-07-05

## 2025-07-04 RX ORDER — FENTANYL CITRATE 50 UG/ML
50 INJECTION, SOLUTION INTRAMUSCULAR; INTRAVENOUS ONCE
Refills: 0 | Status: COMPLETED | OUTPATIENT
Start: 2025-07-04 | End: 2025-07-04

## 2025-07-04 RX ORDER — INDOMETHACIN 25 MG/1
CAPSULE ORAL
Status: COMPLETED | OUTPATIENT
Start: 2025-07-04 | End: 2025-07-04

## 2025-07-04 RX ORDER — INDOMETHACIN 25 MG/1
50 CAPSULE ORAL ONCE
Status: COMPLETED | OUTPATIENT
Start: 2025-07-04 | End: 2025-07-04

## 2025-07-04 RX ORDER — CHLORHEXIDINE GLUCONATE ORAL RINSE 1.2 MG/ML
15 SOLUTION DENTAL 2 TIMES DAILY
Status: DISCONTINUED | OUTPATIENT
Start: 2025-07-04 | End: 2025-07-05 | Stop reason: HOSPADM

## 2025-07-04 RX ORDER — EPINEPHRINE IN SOD CHLOR,ISO 1 MG/10 ML
SYRINGE (ML) INTRAVENOUS
Status: COMPLETED | OUTPATIENT
Start: 2025-07-04 | End: 2025-07-04

## 2025-07-04 RX ORDER — DOBUTAMINE HYDROCHLORIDE 200 MG/100ML
2.5 INJECTION INTRAVENOUS CONTINUOUS
Status: DISCONTINUED | OUTPATIENT
Start: 2025-07-04 | End: 2025-07-05

## 2025-07-04 RX ORDER — TRAZODONE HYDROCHLORIDE 100 MG/1
100 TABLET ORAL NIGHTLY
COMMUNITY

## 2025-07-04 RX ORDER — NOREPINEPHRINE BIT/0.9 % NACL 32MG/250ML
1-100 PLASTIC BAG, INJECTION (ML) INTRAVENOUS CONTINUOUS
Status: DISCONTINUED | OUTPATIENT
Start: 2025-07-04 | End: 2025-07-05

## 2025-07-04 RX ORDER — CALCIUM CHLORIDE 100 MG/ML
INJECTION INTRAVENOUS; INTRAVENTRICULAR
Status: COMPLETED | OUTPATIENT
Start: 2025-07-04 | End: 2025-07-04

## 2025-07-04 RX ORDER — FENTANYL CITRATE 50 UG/ML
INJECTION, SOLUTION INTRAMUSCULAR; INTRAVENOUS
Status: COMPLETED
Start: 2025-07-04 | End: 2025-07-04

## 2025-07-04 RX ORDER — FENTANYL CITRATE-0.9 % NACL/PF 10 MCG/ML
25-200 PLASTIC BAG, INJECTION (ML) INTRAVENOUS CONTINUOUS
Refills: 0 | Status: DISCONTINUED | OUTPATIENT
Start: 2025-07-04 | End: 2025-07-05

## 2025-07-04 RX ORDER — FUROSEMIDE 10 MG/ML
80 INJECTION INTRAMUSCULAR; INTRAVENOUS 4 TIMES DAILY
Status: DISCONTINUED | OUTPATIENT
Start: 2025-07-04 | End: 2025-07-04

## 2025-07-04 RX ORDER — HYDROMORPHONE HYDROCHLORIDE 1 MG/ML
0.5 INJECTION, SOLUTION INTRAMUSCULAR; INTRAVENOUS; SUBCUTANEOUS ONCE
Status: COMPLETED | OUTPATIENT
Start: 2025-07-04 | End: 2025-07-04

## 2025-07-04 RX ORDER — LIDOCAINE 4 G/G
1 PATCH TOPICAL DAILY
Status: DISCONTINUED | OUTPATIENT
Start: 2025-07-04 | End: 2025-07-05

## 2025-07-04 RX ORDER — HYDROCORTISONE SODIUM SUCCINATE 100 MG/2ML
50 INJECTION INTRAMUSCULAR; INTRAVENOUS EVERY 6 HOURS
Status: DISCONTINUED | OUTPATIENT
Start: 2025-07-04 | End: 2025-07-05

## 2025-07-04 RX ORDER — DEXMEDETOMIDINE HYDROCHLORIDE 4 UG/ML
.1-1.5 INJECTION, SOLUTION INTRAVENOUS CONTINUOUS
Status: DISCONTINUED | OUTPATIENT
Start: 2025-07-04 | End: 2025-07-05

## 2025-07-04 RX ORDER — ISOSORBIDE DINITRATE 5 MG/1
5 TABLET ORAL 3 TIMES DAILY
COMMUNITY
Start: 2025-04-29

## 2025-07-04 RX ORDER — HYDRALAZINE HYDROCHLORIDE 10 MG/1
10 TABLET, FILM COATED ORAL EVERY 8 HOURS
COMMUNITY

## 2025-07-04 RX ADMIN — FUROSEMIDE 80 MG: 10 INJECTION, SOLUTION INTRAMUSCULAR; INTRAVENOUS at 07:59

## 2025-07-04 RX ADMIN — FENTANYL CITRATE 50 MCG: 50 INJECTION, SOLUTION INTRAMUSCULAR; INTRAVENOUS at 17:32

## 2025-07-04 RX ADMIN — WATER 500 MG: 1 INJECTION INTRAMUSCULAR; INTRAVENOUS; SUBCUTANEOUS at 08:11

## 2025-07-04 RX ADMIN — ALBUMIN (HUMAN) 12.5 G: 0.25 INJECTION, SOLUTION INTRAVENOUS at 02:37

## 2025-07-04 RX ADMIN — CALCIUM CHLORIDE 1000 MG: 100 INJECTION INTRAVENOUS; INTRAVENTRICULAR at 12:29

## 2025-07-04 RX ADMIN — HYDROCORTISONE SODIUM SUCCINATE 50 MG: 100 INJECTION, POWDER, FOR SOLUTION INTRAMUSCULAR; INTRAVENOUS at 20:03

## 2025-07-04 RX ADMIN — HYDROMORPHONE HYDROCHLORIDE 0.5 MG: 1 INJECTION, SOLUTION INTRAMUSCULAR; INTRAVENOUS; SUBCUTANEOUS at 11:08

## 2025-07-04 RX ADMIN — METRONIDAZOLE 500 MG: 500 INJECTION, SOLUTION INTRAVENOUS at 14:55

## 2025-07-04 RX ADMIN — SODIUM CHLORIDE 60 MCG/MIN: 0.9 INJECTION, SOLUTION INTRAVENOUS at 21:36

## 2025-07-04 RX ADMIN — BUDESONIDE 500 MCG: 0.5 INHALANT RESPIRATORY (INHALATION) at 21:20

## 2025-07-04 RX ADMIN — SODIUM CHLORIDE 50 MCG/MIN: 0.9 INJECTION, SOLUTION INTRAVENOUS at 19:22

## 2025-07-04 RX ADMIN — SODIUM CHLORIDE, PRESERVATIVE FREE 10 ML: 5 INJECTION INTRAVENOUS at 08:00

## 2025-07-04 RX ADMIN — ALBUTEROL SULFATE 2.5 MG: 2.5 SOLUTION RESPIRATORY (INHALATION) at 01:20

## 2025-07-04 RX ADMIN — Medication: at 13:15

## 2025-07-04 RX ADMIN — SODIUM CHLORIDE, PRESERVATIVE FREE 10 ML: 5 INJECTION INTRAVENOUS at 21:24

## 2025-07-04 RX ADMIN — DEXMEDETOMIDINE HYDROCHLORIDE 0.2 MCG/KG/HR: 400 INJECTION INTRAVENOUS at 11:33

## 2025-07-04 RX ADMIN — FENTANYL CITRATE 50 MCG: 50 INJECTION INTRAMUSCULAR; INTRAVENOUS at 15:50

## 2025-07-04 RX ADMIN — METRONIDAZOLE 500 MG: 500 INJECTION, SOLUTION INTRAVENOUS at 22:49

## 2025-07-04 RX ADMIN — FENTANYL CITRATE 50 MCG: 50 INJECTION INTRAMUSCULAR; INTRAVENOUS at 17:32

## 2025-07-04 RX ADMIN — Medication: at 13:18

## 2025-07-04 RX ADMIN — SODIUM BICARBONATE 50 MEQ: 84 INJECTION INTRAVENOUS at 12:50

## 2025-07-04 RX ADMIN — SODIUM CHLORIDE 90 MCG/MIN: 0.9 INJECTION, SOLUTION INTRAVENOUS at 23:51

## 2025-07-04 RX ADMIN — FENTANYL CITRATE 50 MCG: 50 INJECTION, SOLUTION INTRAMUSCULAR; INTRAVENOUS at 15:50

## 2025-07-04 RX ADMIN — HYDROCORTISONE SODIUM SUCCINATE 50 MG: 100 INJECTION, POWDER, FOR SOLUTION INTRAMUSCULAR; INTRAVENOUS at 15:14

## 2025-07-04 RX ADMIN — EPINEPHRINE 1 MG: 0.1 INJECTION, SOLUTION ENDOTRACHEAL; INTRACARDIAC; INTRAVENOUS at 12:29

## 2025-07-04 RX ADMIN — VASOPRESSIN 0.03 UNITS/MIN: 20 INJECTION INTRAVENOUS at 22:49

## 2025-07-04 RX ADMIN — VASOPRESSIN 0.03 UNITS/MIN: 20 INJECTION INTRAVENOUS at 13:40

## 2025-07-04 RX ADMIN — Medication: at 13:21

## 2025-07-04 RX ADMIN — SODIUM CHLORIDE 5 MCG/MIN: 0.9 INJECTION, SOLUTION INTRAVENOUS at 10:46

## 2025-07-04 RX ADMIN — SODIUM CHLORIDE 50 MCG/MIN: 0.9 INJECTION, SOLUTION INTRAVENOUS at 13:03

## 2025-07-04 RX ADMIN — EPINEPHRINE 1 MG: 0.1 INJECTION, SOLUTION ENDOTRACHEAL; INTRACARDIAC; INTRAVENOUS at 12:33

## 2025-07-04 RX ADMIN — Medication 50 MCG/HR: at 17:45

## 2025-07-04 RX ADMIN — DEXMEDETOMIDINE HYDROCHLORIDE 1 MCG/KG/HR: 400 INJECTION INTRAVENOUS at 18:19

## 2025-07-04 RX ADMIN — CEFEPIME 2000 MG: 2 INJECTION, POWDER, FOR SOLUTION INTRAVENOUS at 16:02

## 2025-07-04 RX ADMIN — SODIUM BICARBONATE 50 MEQ: 84 INJECTION, SOLUTION INTRAVENOUS at 12:28

## 2025-07-04 RX ADMIN — AZITHROMYCIN MONOHYDRATE 500 MG: 500 INJECTION, POWDER, LYOPHILIZED, FOR SOLUTION INTRAVENOUS at 09:54

## 2025-07-04 RX ADMIN — SODIUM CHLORIDE 40 MCG/MIN: 0.9 INJECTION, SOLUTION INTRAVENOUS at 15:59

## 2025-07-04 RX ADMIN — ALBUMIN (HUMAN) 12.5 G: 0.25 INJECTION, SOLUTION INTRAVENOUS at 00:25

## 2025-07-04 RX ADMIN — METRONIDAZOLE 500 MG: 500 INJECTION, SOLUTION INTRAVENOUS at 06:50

## 2025-07-04 RX ADMIN — DOBUTAMINE HYDROCHLORIDE 5 MCG/KG/MIN: 200 INJECTION INTRAVENOUS at 06:37

## 2025-07-04 RX ADMIN — SODIUM BICARBONATE: 84 INJECTION, SOLUTION INTRAVENOUS at 06:26

## 2025-07-04 RX ADMIN — CHLORHEXIDINE GLUCONATE 15 ML: 1.2 RINSE ORAL at 21:24

## 2025-07-04 RX ADMIN — EPINEPHRINE 1 MG: 0.1 INJECTION, SOLUTION ENDOTRACHEAL; INTRACARDIAC; INTRAVENOUS at 12:25

## 2025-07-04 RX ADMIN — Medication: at 19:31

## 2025-07-04 RX ADMIN — SODIUM BICARBONATE 50 MEQ: 84 INJECTION INTRAVENOUS at 12:48

## 2025-07-04 RX ADMIN — CEFEPIME 1000 MG: 1 INJECTION, POWDER, FOR SOLUTION INTRAMUSCULAR; INTRAVENOUS at 03:45

## 2025-07-04 ASSESSMENT — PAIN DESCRIPTION - DESCRIPTORS: DESCRIPTORS: ACHING

## 2025-07-04 ASSESSMENT — PULMONARY FUNCTION TESTS
PIF_VALUE: 14
PIF_VALUE: 23
PIF_VALUE: 14
PIF_VALUE: 15

## 2025-07-04 ASSESSMENT — PAIN DESCRIPTION - ORIENTATION: ORIENTATION: POSTERIOR

## 2025-07-04 ASSESSMENT — PAIN SCALES - GENERAL: PAINLEVEL_OUTOF10: 2

## 2025-07-04 ASSESSMENT — PAIN DESCRIPTION - FREQUENCY: FREQUENCY: CONTINUOUS

## 2025-07-04 ASSESSMENT — PAIN DESCRIPTION - PAIN TYPE: TYPE: CHRONIC PAIN

## 2025-07-04 ASSESSMENT — PAIN - FUNCTIONAL ASSESSMENT: PAIN_FUNCTIONAL_ASSESSMENT: ACTIVITIES ARE NOT PREVENTED

## 2025-07-04 ASSESSMENT — PAIN DESCRIPTION - LOCATION: LOCATION: BACK

## 2025-07-04 ASSESSMENT — PAIN DESCRIPTION - ONSET: ONSET: ON-GOING

## 2025-07-04 NOTE — PROGRESS NOTES
Spiritual Health History and Assessment/Progress Note  [facility]    Crisis,  , Anticipatory Grief,      Name: Lei Jaimes MRN: [id number]    Age: 79 y.o.     Sex: male   Language: English   Episcopal: Zoroastrian   Shock (HCC)     Date: 7/4/2025            Total Time Calculated: 75 min              Spiritual Assessment began in [facility] A4 INTENSIVE CARE UNIT        Referral/Consult From: Multi-disciplinary team   Encounter Overview/Reason: Crisis  Service Provided For: Family    Lise, Belief, Meaning:   Patient has beliefs or practices that help with coping during difficult times  Family/Friends identify as spiritual, are connected with a lise tradition or spiritual practice, and have beliefs or practices that help with coping during difficult times      Importance and Influence:  Patient has spiritual/personal beliefs that influence decisions regarding their health  Family/Friends have spiritual/personal beliefs that influence decisions regarding the patient's health    Community:  Patient feels well-supported. Support system includes: Spouse/Partner, Children, and Extended family  Family/Friends feel well-supported. Support system includes: Spouse/Partner, Children, and Extended family    Assessment and Plan of Care:     Patient Interventions include: Other: ministry of presence  Family/Friends Interventions include: No family/friends present    Patient Plan of Care: Spiritual Care available upon further referral  Family/Friends Plan of Care: Spiritual Care available upon further referral     received a page for a Code Blue in ICU. Provided a supportive presence for the family. Collaborated with staff.     Pt's wife shared they met in high school and have been  53 years. Pt's 3 children, and extended family were present. Family hopeful for recovery. Shared  availability.    Electronically signed by GRETEL Davey on 7/4/2025 at 3:54 PM

## 2025-07-04 NOTE — PROGRESS NOTES
RRT RN responded to Code Blue called at 1226. RRT RN assisted with ACLS. ROSC obtained. See Code Blue narrator.

## 2025-07-04 NOTE — PROGRESS NOTES
and Nurse    I personally spent 90 minutes of critical care time.  This is time spent at this critically ill patient's bedside actively involved in patient care as well as the coordination of care and discussions with the patient's family.  This does not include any procedural time which has been billed separately.     CRITICAL CARE CONSULTANT NOTE  I had a face to face encounter with the patient, reviewed and interpreted patient data including clinical events, labs, images, vital signs, I/O's, and examined patient.  I have discussed the case and the plan and management of the patient's care with the consulting services, the bedside nurses and the respiratory therapist.      NOTE OF PERSONAL INVOLVEMENT IN CARE   This patient has a high probability of imminent, clinically significant deterioration, which requires the highest level of preparedness to intervene urgently. I participated in the decision-making and personally managed or directed the management of the following life and organ supporting interventions that required my frequent assessment to treat or prevent imminent deterioration.    Jona Shaikh MD   [facility]  [phone]  2025      Review of systems:     Review of Systems       Objective:     Vital Signs:  BP 98/73   Pulse 85   Temp 97 °F (36.1 °C) (Axillary)   Resp 22   Ht 1.829 m (6' 0.01\")   Wt 73.9 kg (162 lb 14.7 oz)   SpO2 96%   BMI 22.09 kg/m²      Temp (24hrs), Av.6 °F (36.4 °C), Min:96.9 °F (36.1 °C), Max:98.1 °F (36.7 °C)           Intake/Output:     Intake/Output Summary (Last 24 hours) at 2025 0905  Last data filed at 2025 0816  Gross per 24 hour   Intake 312.84 ml   Output 245 ml   Net 67.84 ml       Physical Exam  Vitals reviewed.   HENT:      Head: Normocephalic.   Eyes:      Pupils: Pupils are equal, round, and reactive to light.      Comments: Left scleral edema    Cardiovascular:      Rate and Rhythm: Normal rate and regular rhythm.      Pulses:  nightly    Provider, MD Bianca   pantoprazole (PROTONIX) 40 MG tablet Take 1 tablet by mouth once daily 25   Autumn Soto Jr., MD   aspirin 81 MG EC tablet Take 1 tablet by mouth daily 4/3/25 5/3/25  Virgilio Lniares PA-C   budesonide (PULMICORT) 0.5 MG/2ML nebulizer suspension Take 2 mLs by nebulization in the morning and 2 mLs in the evening. 4/3/25 5/3/25  Virgilio Linares PA-C   ipratropium 0.5 mg-albuterol 2.5 mg (DUONEB) 0.5-2.5 (3) MG/3ML SOLN nebulizer solution Inhale 3 mLs into the lungs in the morning and 3 mLs in the evening. 4/3/25   Virgilio Linares PA-C   atorvastatin (LIPITOR) 80 MG tablet Take 1 tablet by mouth nightly 4/3/25 5/3/25  Virgilio Linares PA-C   metoprolol tartrate (LOPRESSOR) 25 MG tablet Take 1 tablet by mouth 2 times daily 4/3/25 5/3/25  Virgilio Linares PA-C   furosemide (LASIX) 40 MG tablet Take 1 tablet by mouth daily 4/3/25   Virgilio Linares PA-C   calcitRIOL (ROCALTROL) 0.25 MCG capsule Take 1 capsule by mouth daily 4/3/25 5/3/25  Virgilio Linares PA-C   Ergocalciferol (VITAMIN D) 52768 units CAPS Take 50,000 Units by mouth once a week for 14 doses 25  Virgilio Linares PA-C       Allergies/Social/Family History:     No Known Allergies   Social History     Tobacco Use    Smoking status: Former     Current packs/day: 0.00     Types: Cigarettes     Quit date: 1998     Years since quittin.5    Smokeless tobacco: Never    Tobacco comments:     Quit approx 20 years ago   Substance Use Topics    Alcohol use: Yes     Alcohol/week: 5.0 standard drinks of alcohol     Comment: occ      Family History   Problem Relation Age of Onset    No Known Problems Father     Heart Disease Mother     Hypertension Other        LABS AND  DATA:   Reviewed    Peak airway pressure:      Minute ventilation:

## 2025-07-04 NOTE — PROGRESS NOTES
[facility] Dosing Services: Antimicrobial Stewardship Daily Doc  Consult for antibiotic dosing of Vancomycin by Kevin Aaron NP  Indication: Multifactorial shock  Day of Therapy: TBD  CVVH    Ht Readings from Last 1 Encounters:   07/03/25 1.829 m (6')        Wt Readings from Last 1 Encounters:   07/03/25 73.9 kg (163 lb)      Vancomycin therapy:  Loading dose: Vancomycin 2000 mg x1 dose given on 7/3 @1121  Maintenance dose: Vancomycin dosing per levels changed to 500 mg IV q12hr due to initiation of CVVH  Dose calculated to approximate a           a. Target AUC/RASHEEDA of N/A          b. Trough of 15-20 mcg/mL   Last level: Will order a level within 24-48hrs of 1st maintenance dose  Plan:   Continue same  CVVH just started at 1321 today  Renal following  Dose administration notes: Doses given appropriately as scheduled    Non-Kinetic Antimicrobial Dosing Regimen:   Current Regimen: Cefepime 1 gm IV q12hr  Recommendation: Cefepime 2 gm IV q8hr - CVVH dosing  Dose administration notes: Doses given appropriately as scheduled    Other Antimicrobial   (not dosed by pharmacist) Zithromax 500 mg IV daily  Flagyl 500 mg IV q8hr   Cultures 7/4: Nares: pending  7/3: Blood: NG x18hr pending   Serum Creatinine Lab Results   Component Value Date/Time    CREATININE 5.27 07/04/2025 12:45 PM          Creatinine Clearance CVVH     Temp Temp: 97.3 °F (36.3 °C) (Axillary)       WBC Lab Results   Component Value Date/Time    WBC 14.8 07/04/2025 12:45 PM          Procalcitonin Lab Results   Component Value Date/Time    PROCAL 2.82 (H) 07/03/2025 10:14 AM      For Antifungals, Metronidazole and Nafcillin: Lab Results   Component Value Date/Time     07/04/2025 12:45 PM    AST 1,249 07/04/2025 12:45 PM        Pharmacist: MIRIAN JO, [facility]  [phone]

## 2025-07-04 NOTE — PROGRESS NOTES
[facility] Dosing Services: Antimicrobial Stewardship Daily Doc  Consult for antibiotic dosing of Vancomycin by ALEX Aaron  Indication: shock  Day of Therapy: TBD    Ht Readings from Last 1 Encounters:   07/03/25 1.829 m (6')        Wt Readings from Last 1 Encounters:   07/03/25 73.9 kg (163 lb)      Vancomycin therapy:  Loading dose: Vancomycin 2000 mg x1 dose now/given  Maintenance dose: Vancomycin: by random level due to SARA   Dose calculated to approximate a           a. Target AUC/RASHEEDA of 400-600          b. Trough of 15-20 mcg/mL   Last level:  mcg/mL  Plan:   Dose administration notes:     Non-Kinetic Antimicrobial Dosing Regimen:   Current Regimen:    Recommendation:   Dose administration notes:     Other Antimicrobial   (not dosed by pharmacist) Cefepime, metronidazole   Cultures    Serum Creatinine Lab Results   Component Value Date/Time    CREATININE 5.20 07/03/2025 10:14 AM          Creatinine Clearance Estimated Creatinine Clearance: 12 mL/min (A) (based on SCr of 5.2 mg/dL (H)).     Temp Temp:         WBC Lab Results   Component Value Date/Time    WBC 11.1 07/03/2025 10:14 AM          Procalcitonin Lab Results   Component Value Date/Time    PROCAL 0.44 (H) 04/03/2025 06:10 AM      For Antifungals, Metronidazole and Nafcillin: Lab Results   Component Value Date/Time     07/03/2025 10:14 AM     07/03/2025 10:14 AM        Pharmacist: Akash Waters  [phone]

## 2025-07-04 NOTE — PROGRESS NOTES
Nutrition Assessment     Type and Reason for Visit: Initial, Wound    Nutrition Recommendations/Plan:   Advance diet per SLP evaluation  When diet advances, Provide Osmin BID to aid in wound healing (95 kcal, 9.8 g carbs, complete amino acids for wound healing 2.5 g)     Malnutrition Assessment:  Malnutrition Status: At risk for malnutrition    Nutrition Assessment:   Patient is a 79 year old male admitted with Septic shock (HCC) [A41.9, R65.21] and  has a past medical history of BPH (benign prostatic hyperplasia), Colon polyps, Cyst of kidney, acquired, Dysphagia, Dysphagia, Enlarged prostate, Flatulence, GERD (gastroesophageal reflux disease), GERD (gastroesophageal reflux disease), HTN (hypertension), Inguinal hernia, Kidney stones, Left adrenal mass, Reflux esophagitis, and Stricture of esophagus.    Pt screened for wounds. Currently NPO pending SLP evaluation. Chart reviewed. Stable wt hx. Diet to advance per SLP. Will order Osmin to be sent when diet advances.     Estimated Daily Nutrient Needs:  Energy (kcal):  1847 (25 kcals/kg) Weight Used for Energy Requirements: Current     Protein (g):  44-60 (0.6-0.8 gPRO/kg CKD3) Weight Used for Protein Requirements: Current        Fluid (ml/day):  1847 Method Used for Fluid Requirements: 1 ml/kcal    Nutrition Related Findings:     Wound Type: Skin Tears    Wt Readings from Last 10 Encounters:   07/04/25 73.9 kg (162 lb 14.7 oz)   07/03/25 73.9 kg (163 lb)   04/11/25 71.7 kg (158 lb)   03/30/25 69.8 kg (153 lb 14.1 oz)   03/12/25 76 kg (167 lb 8 oz)   01/23/25 72.6 kg (160 lb)   04/16/24 77.1 kg (169 lb 14.4 oz)   04/03/24 79.7 kg (175 lb 9.6 oz)   08/02/23 78 kg (172 lb)   05/31/23 80.2 kg (176 lb 12.8 oz)     Current Nutrition Therapies:    Diet NPO    Anthropometric Measures:  Height: 182.9 cm (6' 0.01\")  Current Body Wt: 73.9 kg (162 lb 14.7 oz)   BMI: 22.1        Nutrition Diagnosis:   Increased nutrient needs related to increase demand for energy/nutrients as  evidenced by wounds    Nutrition Interventions:   Food and/or Nutrient Delivery: Continue NPO, Start Oral Diet, Start Oral Nutrition Supplement  Nutrition Education/Counseling: No recommendation at this time  Coordination of Nutrition Care: Continue to monitor while inpatient       Goals:  Goals: Initiate PO diet, by next RD assessment  Type of Goal: New goal  Previous Goal Met: New Goal    Nutrition Monitoring and Evaluation:   Behavioral-Environmental Outcomes: None Identified  Food/Nutrient Intake Outcomes: Diet Advancement/Tolerance, Food and Nutrient Intake, Supplement Intake  Physical Signs/Symptoms Outcomes: Biochemical Data, Weight    Discharge Planning:    Too soon to determine     Anibal Ye RD

## 2025-07-04 NOTE — PLAN OF CARE
Problem: Chronic Conditions and Co-morbidities  Goal: Patient's chronic conditions and co-morbidity symptoms are monitored and maintained or improved  Outcome: Progressing  Flowsheets (Taken 7/4/2025 0000)  Care Plan - Patient's Chronic Conditions and Co-Morbidity Symptoms are Monitored and Maintained or Improved:   Monitor and assess patient's chronic conditions and comorbid symptoms for stability, deterioration, or improvement   Collaborate with multidisciplinary team to address chronic and comorbid conditions and prevent exacerbation or deterioration   Update acute care plan with appropriate goals if chronic or comorbid symptoms are exacerbated and prevent overall improvement and discharge     Problem: Skin/Tissue Integrity  Goal: Skin integrity remains intact  Description: 1.  Monitor for areas of redness and/or skin breakdown  2.  Assess vascular access sites hourly  3.  Every 4-6 hours minimum:  Change oxygen saturation probe site  4.  Every 4-6 hours:  If on nasal continuous positive airway pressure, respiratory therapy assess nares and determine need for appliance change or resting period  Outcome: Progressing     Problem: ABCDS Injury Assessment  Goal: Absence of physical injury  Outcome: Progressing     Problem: Pain  Goal: Verbalizes/displays adequate comfort level or baseline comfort level  Outcome: Progressing     Problem: Safety - Adult  Goal: Free from fall injury  Outcome: Progressing

## 2025-07-04 NOTE — ED NOTES
Transfer to [facility], via stretcher, with [facility]. Report given to [facility]. EMTALA and paperwork sent via EMS.

## 2025-07-04 NOTE — PLAN OF CARE
Speech LAnguage Pathology EVALUATION    Patient: Lei Jaimes (79 y.o. male)  Date: 7/4/2025  Primary Diagnosis: Septic shock (HCC) [A41.9, R65.21]       Precautions:              aspiration       ASSESSMENT :  Patient with ability to swallow small amounts of liquids and purees.   He has some AMS that affect straw suck and communication.   Chewing was limited by edentulous status and AMS.   He has a h/o esophageal stricture with dilatation-last one over a year ago per wife. This gives his regurgitation risk for aspiration with AMS increasing this risk.   Will start full liquids. His post prandial risk appears larger than his prandial aspiration risk.   Admitted 7-3-25 with fatigue, wound, sepsis.  Cardiogenic shock.   Chest CT:  moderate to large (B) pl effusions R>L, RML opacities /atalectasis and compression of posterior esophagus.    PMH:  CHF, CKD,  GI bleed with esophageal tear, GERD with frequent esophageal dilatations.      Patient will benefit from skilled intervention to address the above impairments.     PLAN :  Recommendations and Planned Interventions:  Diet: Full liquid  Upright for all PO  Upright after PO for 30 min   HOB no lower then 30 degrees for sleeping at all times.   Ok to advance diet to soft and bite sized, if more alert and taking PO safely on full liquids.          Acute SLP Services:  . Patient's rehabilitation potential is considered to be Fair.  Discharge Recommendations: Continue to assess pending progress     SUBJECTIVE:   Patient 's wife and family present for eval.    OBJECTIVE:     Past Medical History:   Diagnosis Date    BPH (benign prostatic hyperplasia) 06/01/2021    Colon polyps 2007    RECTAL POLYP    Cyst of kidney, acquired     pt denies - states had kidney stones    Dysphagia 06/01/2021    Dysphagia     Enlarged prostate     Flatulence 06/01/2021    GERD (gastroesophageal reflux disease)     GERD (gastroesophageal reflux disease)     HTN (hypertension) 06/01/2021     Quality:  (low volume)  Consistency Presented: Thin;Pureed;Soft & Bite Sized  How Presented: SLP-fed/Presented;Spoon;Straw  Bolus Acceptance: Impaired (cues to use straw)  Bolus Formation/Control: Impaired (reduced chew. suspect he partially melted natalia cracker with ap suace and then swallowed the rest whole)  Type of Impairment: Mastication  Propulsion: Delayed (# of seconds)  Oral Residue: None  Initiation of Swallow: Delayed (# of seconds) (effortful swallow)  Laryngeal Elevation: Functional (possiblly slight weakness due to double swallow)  Aspiration Signs/Symptoms: None  Pharyngeal Phase Characteristics: Effortful swallow  Effective Modifications: None            Motor Speech:     Minimal verbal output at this time                             Outcome Measure:  Functional Oral Intake Scale (FOIS): 5--Total oral diet with multiple consistencies but requiring special preparation or compensations    After treatment:   Patient left in no apparent distress in bed, Nursing notified, and Caregiver present    COMMUNICATION/EDUCATION:   Patient was educated regarding potential dysphagia.  He demonstrated Guarded understanding as evidenced by Limited understanding/response due to cognitive status. Several family members present at bedside and verbalized understanding to education provided.     The patient's plan of care including recommendations, planned interventions, and recommended diet changes were discussed with: Registered nurse and Physician    Patient/family have participated as able in goal setting and plan of care and Patient/family agree to work toward stated goals and plan of care    Thank you,  Yoanna Wallace, SLP    SLP Individual Minutes  Time In: 0920  Time Out: 0940  Minutes: 20     Problem: SLP Adult - Impaired Swallowing  Goal: By Discharge: Advance to least restrictive diet without signs or symptoms of aspiration for planned discharge setting.  See evaluation for individualized goals.  Description:

## 2025-07-04 NOTE — FLOWSHEET NOTE
CRRT INITIATION    07/04/25 1400   Observations & Evaluations   Level of Consciousness 2   Oriented X   (needs to be reassessed patient was sedated for intubation)   Heart Rhythm   (bedside telemetry monitored by icu nurse)   Respiratory Quality/Effort Unlabored   O2 Device Ventilator   Skin Color   (appropriate for ethnicity)   Skin Condition/Temp Dry;Warm   Abdomen Inspection Soft;Rounded   Edema Right lower extremity;Left lower extremity   RLE Edema +2   LLE Edema +2   Vital Signs   /66   Temp 97.3 °F (36.3 °C)   Pulse (!) 123   Respirations 20   SpO2 95 %   ICEBOAT   ICEBOAT I;C;E;B;O;A;T   Treatment   $CRRT $Yes   Machine #   ([id number])   Cartridge Lot #   ([id number])   Therapy Type CVVH   Non-CRRT Intake (Must also document in I&O flowsheet)   IV/IVPB (mL) 315 mL   Total Non-CRRT Intake (Calculated) 315   Non-CRRT Output (Must also document in I&O flowsheet)   Shah (mL) 100 mL   Total Non-CRRT Output  (Calculated) 100 mL   System Used   System Used Stella   NxStage Calculation   (A) Total Non-NxStage Intake (mL) 315 mL   (C) Total Non-NxStage Output (mL) 100 mL/hr   Pressures (Stella)   Access (mmHg) -73 mmHg   Return/Venous (mmHg) 57 mmHg   TMP (mmHg) 31 mmHg   Pressure Drop (mmHg) 68 mmHg   Filter (mmHg) 175 mmHg   Effluent (mmHg) 78 mmHg   Deaeration Chamber Check Yes   Flow Rates (Stella)   Blood Flow (mL/min) 200 mL/min   Replacement Fluid Pre-Filter (mL/hr) 460 mL/hr   Replacement Filter Post-Filter (mL/hr) 470 mL/hr   Pre-Blood Pump (mL/hr) 920 mL/hr   Stella Calculation   (A) Total Non-Stella Intake (mL) 315 mL   (B) Total Non-Stella Output (mL) 100 mL   (C) Balance (A-B) 215 ml   (D) Physician Ordered Hourly Removal (mL) 25 ml   (E) Amount of UF removed last hour 0 ml   (F) Amount Programmed to Remove (I from last hour) 0   (G) Amount ahead or behind (E-F) 0   (H) Calculated Removal Rate for the next hour (C+D-G) 240   (I) Set Removal Rate for the next hour (see row info) 240   (J) Actual

## 2025-07-04 NOTE — CONSULTS
that he does not use drugs.     Family History   Problem Relation Age of Onset    No Known Problems Father     Heart Disease Mother     Hypertension Other        Review of Systems:  A thorough twelve point review of system was performed today. Pertinent positives and negatives are mentioned in the HPI. The reminder of the ROS is negative and noncontributory.     Objective:    Vitals:    Vitals:    07/04/25 0800 07/04/25 0815 07/04/25 0846 07/04/25 0900   BP: (!) 103/49 98/73  (!) 106/48   Pulse: (!) 102 85  86   Resp: 18 22  19   Temp:  97 °F (36.1 °C)     TempSrc:  Axillary     SpO2: 94% 96%  93%   Weight:       Height:   1.829 m (6' 0.01\")      I&O's:  07/03 0701 - 07/04 0700  In: -   Out: 120 [Urine:120]  BP (!) 106/48   Pulse 86   Temp 97 °F (36.1 °C) (Axillary)   Resp 19   Ht 1.829 m (6' 0.01\")   Wt 73.9 kg (162 lb 14.7 oz)   SpO2 93%   BMI 22.09 kg/m²     Physical Exam:  General:  No apparent Distress  HEENT: PERRL,  No Pallor , No Icterus  Neck: Supple,no mass palpable  Lungs : decreased breadth sounds   CVS: RRR, S1 S2 normal, No murmur   Abdomen: Soft, NT, BS +  Extremities: 2 + Edema  Skin: No rash or lesions.  MS: No joint swelling, erythema, warmth  Neurologic: non focal, AAO x1  Psych: poor mentation     Laboratory Results:    Recent Labs     07/03/25  1014 07/04/25  0411   * 136  134*   K 4.9 5.2*  5.2*   CL 96* 101  101   CO2 19* 14*  14*   * 132*  131*   MG  --  2.7*  2.7*   PHOS  --  6.2*  5.9*   * 1,060*  997*     Recent Labs     07/03/25  1014 07/04/25  0411   WBC 11.1 11.5*  12.3*   HGB 14.6 14.3  14.0   HCT 43.3 45.2  42.7   * 100*  79*     No results found for: \"SDES\"  No components found for: \"CULT\"  Recent Results (from the past 24 hours)   Lactate, Sepsis    Collection Time: 07/03/25 12:57 PM   Result Value Ref Range    Lactic Acid, Sepsis 4.2 (HH) 0.4 - 2.0 mmol/L   Troponin    Collection Time: 07/03/25 12:57 PM   Result Value Ref Range     Sig Start Date End Date Taking? Authorizing Provider   isosorbide dinitrate (ISORDIL) 5 MG tablet Take 1 tablet by mouth 3 times daily 4/29/25  Yes ProviderBianca MD   hydrALAZINE (APRESOLINE) 10 MG tablet Take 1 tablet by mouth every 8 (eight) hours    Bianca Oneill MD   traZODone (DESYREL) 100 MG tablet Take 1 tablet by mouth nightly    ProviderBianca MD   pantoprazole (PROTONIX) 40 MG tablet Take 1 tablet by mouth once daily 5/20/25   Autumn Soto Jr., MD   aspirin 81 MG EC tablet Take 1 tablet by mouth daily 4/3/25 5/3/25  Virgilio Linares PA-C   budesonide (PULMICORT) 0.5 MG/2ML nebulizer suspension Take 2 mLs by nebulization in the morning and 2 mLs in the evening. 4/3/25 5/3/25  Virgilio Linares PA-C   ipratropium 0.5 mg-albuterol 2.5 mg (DUONEB) 0.5-2.5 (3) MG/3ML SOLN nebulizer solution Inhale 3 mLs into the lungs in the morning and 3 mLs in the evening. 4/3/25   Virgilio Linares PA-C   atorvastatin (LIPITOR) 80 MG tablet Take 1 tablet by mouth nightly 4/3/25 5/3/25  Virgilio Linares PA-C   metoprolol tartrate (LOPRESSOR) 25 MG tablet Take 1 tablet by mouth 2 times daily 4/3/25 5/3/25  Virgilio Linares PA-C   furosemide (LASIX) 40 MG tablet Take 1 tablet by mouth daily 4/3/25   Virgilio Linares PA-C   calcitRIOL (ROCALTROL) 0.25 MCG capsule Take 1 capsule by mouth daily 4/3/25 5/3/25  Virgilio Linares PA-C   Ergocalciferol (VITAMIN D) 31845 units CAPS Take 50,000 Units by mouth once a week for 14 doses 4/5/25 7/6/25  Virgilio Linares PA-C        Thank you for allowing us to participate in the care of this patient.   We will follow patient. Please don’t hesitate to call with any questions  Poor prognosis       Lorin Cardoso MD  [facility]   [address] [place] [address], [facility] [place] [address]  Phone - [phone]   Fax - [phone]  [email or web address]

## 2025-07-04 NOTE — PROGRESS NOTES
1137 Dr. Shaikh at bedside to place HD and ART lines.     1150  Right Subclavian HD CVC placed by Dr. Shaikh    1212 Arterial Line placed Rt Radial by Dr. Shaikh     1220 Pt desatting, Pulse carotid pulse still present.     1224 Loss of pulse, CPR started immediately and Code Blue called.     1225: 1mg Epi administered     1227: Pulse rechecked. No pulse present. CPR resumed.     1228: 50mg of Bicarb given    1229 1000mg Calcium Chloride given, pulse checked. No pulse present. CPR resumed    1231  Pulse Check, femoral pulse present with doppler. CPR stopped. ROSC achieved.     1233 1mg Epi administered per  Dr. Shaikh request.Pt intubated with 7.5 ET tube 23 @ lip by Dr. Shaikh.     1248 50mg Bicarb given    1250 50mg Bicarb given

## 2025-07-04 NOTE — CONSULTS
[facility] CARDIOLOGY                    Cardiology Care Note     [x]Initial Encounter     []Follow-up    Patient Name: Lei Jaimes - :1945 - MRN:[id number]  Primary Cardiologist: JESÚS Beach   Consulting Cardiologist: Moris Soares MD     Reason for encounter: HF r EF     HPI:       Lei Jaimes is a 79 y.o. male with PMH significant for HF r EF,  BPH, dysphagia, GERD, HTN, cirrhosis & esophageal stricture who initially presented to [facility] ER on 7/3 with generalized weakness.  Patient presented there tachypneic (33), hypotensive (92/66) with laboratory workup demonstrating severe SARA (SCR 5.20), lactic acidosis (4.8), HAGMA (AG 20, CO2 19), elevated LFTs (AST 9 83/) with elevated bilirubin (6.5).      CT A/P with large bilateral pleural effusions, abnormal appearance of the prostate with mass effect on the posterior bladder, adrenal gland nodules, various renal lesions and potential liver nodularity.  Broad-spectrum antibiotics initiated and sepsis bolus ordered.      Multiple attempts made to place Shah catheter, unable to pass secondary to enlarged prostate.  Request made to transfer patient to [facility] for availability of urological services and further management of septic shock.     [facility] ER HPI : Mr. Lei Jaimes , a 79-year-old male with a history of heart failure, presents to the Emergency Department with generalized weakness and a wound on his sacrum. The patient reports feeling \"low energy\" and mentions that he has been experiencing more swelling in his legs than usual, which he describes as the \"5th episode.\"     Mr. Jaimes medical history is significant for heart failure, which he reports occurred in April.  Also had a GI bleed with esophageal tear at that time he also mentions a history of kidney problems, though he is not on dialysis and is followed by nephrology. The patient notes that he has experienced \"some kidney pain\" in the past.      The

## 2025-07-05 VITALS
OXYGEN SATURATION: 91 % | TEMPERATURE: 97.2 F | HEIGHT: 72 IN | WEIGHT: 162 LBS | DIASTOLIC BLOOD PRESSURE: 56 MMHG | BODY MASS INDEX: 21.94 KG/M2 | SYSTOLIC BLOOD PRESSURE: 85 MMHG | HEART RATE: 39 BPM

## 2025-07-05 LAB
ALBUMIN SERPL-MCNC: 2.3 G/DL (ref 3.5–5)
ALBUMIN/GLOB SERPL: 0.7 (ref 1.1–2.2)
ALP SERPL-CCNC: 395 U/L (ref 45–117)
ALT SERPL-CCNC: 1991 U/L (ref 12–78)
ANION GAP BLD CALC-SCNC: 13 (ref 10–20)
ANION GAP SERPL CALC-SCNC: 17 MMOL/L (ref 2–12)
ANION GAP SERPL CALC-SCNC: 20 MMOL/L (ref 2–12)
ARTERIAL PATENCY WRIST A: ABNORMAL
ARTERIAL PATENCY WRIST A: ABNORMAL
AST SERPL-CCNC: >2000 U/L (ref 15–37)
BACTERIA SPEC CULT: NORMAL
BACTERIA SPEC CULT: NORMAL
BASE DEFICIT BLD-SCNC: 13.3 MMOL/L
BASE DEFICIT BLD-SCNC: 8.6 MMOL/L
BASE DEFICIT BLDA-SCNC: 11.5 MMOL/L
BDY SITE: ABNORMAL
BDY SITE: ABNORMAL
BILIRUB DIRECT SERPL-MCNC: 7.6 MG/DL (ref 0–0.2)
BILIRUB SERPL-MCNC: 10.4 MG/DL (ref 0.2–1)
BUN SERPL-MCNC: 77 MG/DL (ref 6–20)
BUN SERPL-MCNC: 89 MG/DL (ref 6–20)
BUN/CREAT SERPL: 23 (ref 12–20)
BUN/CREAT SERPL: 24 (ref 12–20)
CA-I BLD-MCNC: 0.98 MMOL/L (ref 1.15–1.33)
CALCIUM SERPL-MCNC: 6.5 MG/DL (ref 8.5–10.1)
CALCIUM SERPL-MCNC: 7.3 MG/DL (ref 8.5–10.1)
CHLORIDE BLD-SCNC: 110 MMOL/L (ref 100–111)
CHLORIDE SERPL-SCNC: 106 MMOL/L (ref 97–108)
CHLORIDE SERPL-SCNC: 107 MMOL/L (ref 97–108)
CO2 BLD-SCNC: 11 MMOL/L (ref 22–29)
CO2 SERPL-SCNC: 11 MMOL/L (ref 21–32)
CO2 SERPL-SCNC: 15 MMOL/L (ref 21–32)
CREAT SERPL-MCNC: 3.31 MG/DL (ref 0.7–1.3)
CREAT SERPL-MCNC: 3.66 MG/DL (ref 0.7–1.3)
CREAT UR-MCNC: 4.4 MG/DL (ref 0.6–1.3)
GAS FLOW.O2 O2 DELIVERY SYS: ABNORMAL
GAS FLOW.O2 SETTING OXYMISER: 18 BPM
GLOBULIN SER CALC-MCNC: 3.1 G/DL (ref 2–4)
GLUCOSE BLD STRIP.AUTO-MCNC: 31 MG/DL (ref 65–117)
GLUCOSE BLD STRIP.AUTO-MCNC: 37 MG/DL (ref 65–117)
GLUCOSE BLD STRIP.AUTO-MCNC: 40 MG/DL (ref 74–99)
GLUCOSE BLD STRIP.AUTO-MCNC: 65 MG/DL (ref 65–117)
GLUCOSE BLD STRIP.AUTO-MCNC: 66 MG/DL (ref 65–117)
GLUCOSE BLD STRIP.AUTO-MCNC: 68 MG/DL (ref 65–117)
GLUCOSE BLD STRIP.AUTO-MCNC: 75 MG/DL (ref 65–117)
GLUCOSE BLD STRIP.AUTO-MCNC: 80 MG/DL (ref 65–117)
GLUCOSE BLD STRIP.AUTO-MCNC: 99 MG/DL (ref 65–117)
GLUCOSE SERPL-MCNC: 48 MG/DL (ref 65–100)
GLUCOSE SERPL-MCNC: 89 MG/DL (ref 65–100)
HBV SURFACE AG SER QL: 0.15 INDEX
HBV SURFACE AG SER QL: NEGATIVE
HCO3 BLD-SCNC: 15.8 MMOL/L (ref 21–28)
HCO3 BLDA-SCNC: 12 MMOL/L
HCO3 BLDA-SCNC: 14 MMOL/L (ref 22–26)
HIV 1+2 AB+HIV1 P24 AG SERPL QL IA: NONREACTIVE
HIV 1/2 RESULT COMMENT: NORMAL
LACTATE BLD-SCNC: 7.71 MMOL/L (ref 0.4–2)
LACTATE SERPL-SCNC: 11.4 MMOL/L (ref 0.4–2)
LACTATE SERPL-SCNC: 8.8 MMOL/L (ref 0.4–2)
O2/TOTAL GAS SETTING VFR VENT: 60 %
PCO2 BLD: 25.2 MMHG (ref 35–48)
PCO2 BLD: 30.2 MMHG (ref 35–48)
PCO2 BLDA: 31 MMHG (ref 35–45)
PEEP RESPIRATORY: 5 CMH2O
PH BLD: 7.27 (ref 7.35–7.45)
PH BLD: 7.33 (ref 7.35–7.45)
PH BLDA: 7.27 (ref 7.35–7.45)
PO2 BLD: 88 MMHG (ref 83–108)
PO2 BLD: 91 MMHG (ref 83–108)
PO2 BLDA: 78 MMHG (ref 80–100)
POTASSIUM BLD-SCNC: 5.6 MMOL/L (ref 3.5–5.5)
POTASSIUM SERPL-SCNC: 5.4 MMOL/L (ref 3.5–5.1)
POTASSIUM SERPL-SCNC: 5.5 MMOL/L (ref 3.5–5.1)
POTASSIUM SERPL-SCNC: 5.7 MMOL/L (ref 3.5–5.1)
PROT SERPL-MCNC: 5.4 G/DL (ref 6.4–8.2)
SAO2 % BLD: 94 % (ref 92–97)
SAO2 % BLD: 96 % (ref 94–98)
SAO2 % BLD: 96.6 % (ref 92–97)
SAO2% DEVICE SAO2% SENSOR NAME: ABNORMAL
SERVICE CMNT-IMP: ABNORMAL
SERVICE CMNT-IMP: NORMAL
SODIUM BLD-SCNC: 134 MMOL/L (ref 136–145)
SODIUM SERPL-SCNC: 138 MMOL/L (ref 136–145)
SODIUM SERPL-SCNC: 138 MMOL/L (ref 136–145)
SPECIMEN SITE: ABNORMAL
SPECIMEN SITE: ABNORMAL
SPECIMEN TYPE: ABNORMAL
TROPONIN I SERPL HS-MCNC: 2343 NG/L (ref 0–76)
TROPONIN I SERPL HS-MCNC: 3336 NG/L (ref 0–76)
VENTILATION MODE VENT: ABNORMAL
VT SETTING VENT: 500 ML

## 2025-07-05 PROCEDURE — 82962 GLUCOSE BLOOD TEST: CPT

## 2025-07-05 PROCEDURE — 6360000002 HC RX W HCPCS: Performed by: INTERNAL MEDICINE

## 2025-07-05 PROCEDURE — 82947 ASSAY GLUCOSE BLOOD QUANT: CPT

## 2025-07-05 PROCEDURE — 6360000002 HC RX W HCPCS: Performed by: NURSE PRACTITIONER

## 2025-07-05 PROCEDURE — 84132 ASSAY OF SERUM POTASSIUM: CPT

## 2025-07-05 PROCEDURE — 36415 COLL VENOUS BLD VENIPUNCTURE: CPT

## 2025-07-05 PROCEDURE — 82803 BLOOD GASES ANY COMBINATION: CPT

## 2025-07-05 PROCEDURE — 37799 UNLISTED PX VASCULAR SURGERY: CPT

## 2025-07-05 PROCEDURE — 84295 ASSAY OF SERUM SODIUM: CPT

## 2025-07-05 PROCEDURE — 84484 ASSAY OF TROPONIN QUANT: CPT

## 2025-07-05 PROCEDURE — 94640 AIRWAY INHALATION TREATMENT: CPT

## 2025-07-05 PROCEDURE — 87389 HIV-1 AG W/HIV-1&-2 AB AG IA: CPT

## 2025-07-05 PROCEDURE — 83605 ASSAY OF LACTIC ACID: CPT

## 2025-07-05 PROCEDURE — 2700000000 HC OXYGEN THERAPY PER DAY

## 2025-07-05 PROCEDURE — 2580000003 HC RX 258: Performed by: INTERNAL MEDICINE

## 2025-07-05 PROCEDURE — 2500000003 HC RX 250 WO HCPCS: Performed by: NURSE PRACTITIONER

## 2025-07-05 PROCEDURE — 82330 ASSAY OF CALCIUM: CPT

## 2025-07-05 PROCEDURE — 80048 BASIC METABOLIC PNL TOTAL CA: CPT

## 2025-07-05 PROCEDURE — 2500000003 HC RX 250 WO HCPCS: Performed by: INTERNAL MEDICINE

## 2025-07-05 PROCEDURE — 94003 VENT MGMT INPAT SUBQ DAY: CPT

## 2025-07-05 PROCEDURE — 86803 HEPATITIS C AB TEST: CPT

## 2025-07-05 PROCEDURE — 6370000000 HC RX 637 (ALT 250 FOR IP): Performed by: INTERNAL MEDICINE

## 2025-07-05 PROCEDURE — 80076 HEPATIC FUNCTION PANEL: CPT

## 2025-07-05 PROCEDURE — 2580000003 HC RX 258: Performed by: NURSE PRACTITIONER

## 2025-07-05 PROCEDURE — 6370000000 HC RX 637 (ALT 250 FOR IP): Performed by: NURSE PRACTITIONER

## 2025-07-05 RX ORDER — INDOMETHACIN 25 MG/1
100 CAPSULE ORAL ONCE
Status: COMPLETED | OUTPATIENT
Start: 2025-07-05 | End: 2025-07-05

## 2025-07-05 RX ORDER — DEXTROSE MONOHYDRATE 100 MG/ML
INJECTION, SOLUTION INTRAVENOUS CONTINUOUS PRN
Status: DISCONTINUED | OUTPATIENT
Start: 2025-07-05 | End: 2025-07-05 | Stop reason: HOSPADM

## 2025-07-05 RX ORDER — HALOPERIDOL 5 MG/ML
5 INJECTION INTRAMUSCULAR EVERY 4 HOURS PRN
Status: DISCONTINUED | OUTPATIENT
Start: 2025-07-05 | End: 2025-07-05 | Stop reason: HOSPADM

## 2025-07-05 RX ORDER — MORPHINE SULFATE 4 MG/ML
2 INJECTION, SOLUTION INTRAMUSCULAR; INTRAVENOUS
Refills: 0 | Status: DISCONTINUED | OUTPATIENT
Start: 2025-07-05 | End: 2025-07-05 | Stop reason: HOSPADM

## 2025-07-05 RX ORDER — MIDAZOLAM HYDROCHLORIDE 2 MG/2ML
2 INJECTION, SOLUTION INTRAMUSCULAR; INTRAVENOUS EVERY 30 MIN PRN
Status: DISCONTINUED | OUTPATIENT
Start: 2025-07-05 | End: 2025-07-05 | Stop reason: HOSPADM

## 2025-07-05 RX ORDER — GLYCOPYRROLATE 0.2 MG/ML
0.1 INJECTION INTRAMUSCULAR; INTRAVENOUS EVERY 4 HOURS
Status: DISCONTINUED | OUTPATIENT
Start: 2025-07-05 | End: 2025-07-05 | Stop reason: HOSPADM

## 2025-07-05 RX ORDER — DEXTROSE MONOHYDRATE 100 MG/ML
INJECTION, SOLUTION INTRAVENOUS CONTINUOUS
Status: DISCONTINUED | OUTPATIENT
Start: 2025-07-05 | End: 2025-07-05

## 2025-07-05 RX ADMIN — SODIUM CHLORIDE 100 MCG/MIN: 0.9 INJECTION, SOLUTION INTRAVENOUS at 05:59

## 2025-07-05 RX ADMIN — METRONIDAZOLE 500 MG: 500 INJECTION, SOLUTION INTRAVENOUS at 06:16

## 2025-07-05 RX ADMIN — Medication: at 01:15

## 2025-07-05 RX ADMIN — DEXTROSE MONOHYDRATE 125 ML: 10 INJECTION, SOLUTION INTRAVENOUS at 01:52

## 2025-07-05 RX ADMIN — VANCOMYCIN HYDROCHLORIDE 500 MG: 500 INJECTION, POWDER, LYOPHILIZED, FOR SOLUTION INTRAVENOUS at 00:01

## 2025-07-05 RX ADMIN — Medication: at 01:06

## 2025-07-05 RX ADMIN — DEXTROSE 250 ML: 10 SOLUTION INTRAVENOUS at 01:00

## 2025-07-05 RX ADMIN — DEXTROSE MONOHYDRATE 125 ML: 10 INJECTION, SOLUTION INTRAVENOUS at 06:13

## 2025-07-05 RX ADMIN — DEXMEDETOMIDINE HYDROCHLORIDE 1.2 MCG/KG/HR: 400 INJECTION INTRAVENOUS at 00:08

## 2025-07-05 RX ADMIN — AZITHROMYCIN MONOHYDRATE 500 MG: 500 INJECTION, POWDER, LYOPHILIZED, FOR SOLUTION INTRAVENOUS at 09:23

## 2025-07-05 RX ADMIN — BUDESONIDE 500 MCG: 0.5 INHALANT RESPIRATORY (INHALATION) at 08:40

## 2025-07-05 RX ADMIN — CEFEPIME 2000 MG: 2 INJECTION, POWDER, FOR SOLUTION INTRAVENOUS at 08:20

## 2025-07-05 RX ADMIN — CHLORHEXIDINE GLUCONATE 15 ML: 1.2 RINSE ORAL at 08:22

## 2025-07-05 RX ADMIN — Medication: at 06:38

## 2025-07-05 RX ADMIN — Medication 100 MCG/HR: at 05:44

## 2025-07-05 RX ADMIN — CEFEPIME 2000 MG: 2 INJECTION, POWDER, FOR SOLUTION INTRAVENOUS at 00:01

## 2025-07-05 RX ADMIN — VASOPRESSIN 0.03 UNITS/MIN: 20 INJECTION INTRAVENOUS at 09:30

## 2025-07-05 RX ADMIN — SODIUM BICARBONATE 100 MEQ: 84 INJECTION INTRAVENOUS at 04:04

## 2025-07-05 RX ADMIN — DEXTROSE MONOHYDRATE: 10 INJECTION, SOLUTION INTRAVENOUS at 07:09

## 2025-07-05 RX ADMIN — HYDROCORTISONE SODIUM SUCCINATE 50 MG: 100 INJECTION, POWDER, FOR SOLUTION INTRAMUSCULAR; INTRAVENOUS at 01:18

## 2025-07-05 RX ADMIN — SODIUM CHLORIDE, PRESERVATIVE FREE 10 ML: 5 INJECTION INTRAVENOUS at 08:24

## 2025-07-05 RX ADMIN — DEXMEDETOMIDINE HYDROCHLORIDE 0.8 MCG/KG/HR: 400 INJECTION INTRAVENOUS at 05:50

## 2025-07-05 RX ADMIN — SODIUM BICARBONATE 100 MEQ: 84 INJECTION INTRAVENOUS at 00:58

## 2025-07-05 RX ADMIN — HYDROCORTISONE SODIUM SUCCINATE 50 MG: 100 INJECTION, POWDER, FOR SOLUTION INTRAMUSCULAR; INTRAVENOUS at 08:20

## 2025-07-05 RX ADMIN — Medication: at 00:55

## 2025-07-05 RX ADMIN — DEXTROSE MONOHYDRATE 250 ML: 10 INJECTION, SOLUTION INTRAVENOUS at 06:44

## 2025-07-05 RX ADMIN — HUMAN INSULIN 10 UNITS: 100 INJECTION, SOLUTION SUBCUTANEOUS at 00:58

## 2025-07-05 ASSESSMENT — PULMONARY FUNCTION TESTS
PIF_VALUE: 17
PIF_VALUE: 18
PIF_VALUE: 14

## 2025-07-05 NOTE — PLAN OF CARE
Problem: Chronic Conditions and Co-morbidities  Goal: Patient's chronic conditions and co-morbidity symptoms are monitored and maintained or improved  7/5/2025 1123 by Yaz Flores, RN  Outcome: Not Progressing  Flowsheets (Taken 7/5/2025 0800)  Care Plan - Patient's Chronic Conditions and Co-Morbidity Symptoms are Monitored and Maintained or Improved:   Monitor and assess patient's chronic conditions and comorbid symptoms for stability, deterioration, or improvement   Collaborate with multidisciplinary team to address chronic and comorbid conditions and prevent exacerbation or deterioration   Update acute care plan with appropriate goals if chronic or comorbid symptoms are exacerbated and prevent overall improvement and discharge  7/5/2025 0238 by Ashtyn Blancas, RN  Outcome: Not Progressing     Problem: Discharge Planning  Goal: Discharge to home or other facility with appropriate resources  Outcome: Not Progressing  Flowsheets (Taken 7/5/2025 0800)  Discharge to home or other facility with appropriate resources:   Identify barriers to discharge with patient and caregiver   Arrange for needed discharge resources and transportation as appropriate   Identify discharge learning needs (meds, wound care, etc)   Arrange for interpreters to assist at discharge as needed   Refer to discharge planning if patient needs post-hospital services based on physician order or complex needs related to functional status, cognitive ability or social support system     Problem: Skin/Tissue Integrity  Goal: Skin integrity remains intact  Description: 1.  Monitor for areas of redness and/or skin breakdown  2.  Assess vascular access sites hourly  3.  Every 4-6 hours minimum:  Change oxygen saturation probe site  4.  Every 4-6 hours:  If on nasal continuous positive airway pressure, respiratory therapy assess nares and determine need for appliance change or resting period  Outcome: Not Progressing  Flowsheets (Taken 7/5/2025  0800)  Skin Integrity Remains Intact:   Monitor for areas of redness and/or skin breakdown   Assess vascular access sites hourly   Every 4-6 hours minimum:  Change oxygen saturation probe site   Every 4-6 hours:  If on nasal continuous positive airway pressure, assess nares and determine need for appliance change or resting period   Assess need for specialty bed   Turn and reposition as indicated   Positioning devices   Pressure redistribution bed/mattress (bed type)   Check visual cues for pain   Monitor skin under medical devices     Problem: ABCDS Injury Assessment  Goal: Absence of physical injury  Outcome: Not Progressing     Problem: Pain  Goal: Verbalizes/displays adequate comfort level or baseline comfort level  Outcome: Not Progressing  Flowsheets (Taken 7/5/2025 0800)  Verbalizes/displays adequate comfort level or baseline comfort level:   Encourage patient to monitor pain and request assistance   Assess pain using appropriate pain scale   Implement non-pharmacological measures as appropriate and evaluate response   Administer analgesics based on type and severity of pain and evaluate response   Consider cultural and social influences on pain and pain management     Problem: Safety - Adult  Goal: Free from fall injury  Outcome: Not Progressing     Problem: Risk for Elopement  Goal: Patient will not exit the unit/facility without proper excort  Outcome: Not Progressing  Flowsheets (Taken 7/5/2025 0800)  Nursing Interventions for Elopement Risk:   Assist with personal care needs such as toileting, eating, dressing, as needed to reduce the risk of wandering   Communicate/escalate to charge nurse the risk of elopement   Escort with two staff members if patient must leave the unit   Make sure patient has all necessary personal care items   Reduce environmental triggers     Problem: Nutrition Deficit:  Goal: Optimize nutritional status  Outcome: Not Progressing     Problem: Respiratory - Adult  Goal: Achieves

## 2025-07-05 NOTE — DISCHARGE SUMMARY
Discharge Summary    Date: 7/5/2025  Patient Name: Lei Jaimes    YOB: 1945     Age: 79 y.o.    Admit Date: 7/3/2025  Discharge Date:  Discharge Condition:    Admission Diagnosis  Septic shock (HCC) [A41.9, R65.21]      Discharge Diagnosis  Principal Problem:    Shock (HCC)  Resolved Problems:    * No resolved hospital problems. *      Hospital Stay  Narrative of Hospital Course:  79 y.o. with a PMH of BPH, dysphagia, GERD, HTN, cirrhosis & esophageal stricture who initially presented to [facility] ER on 7/3 with generalized weakness.  Patient presented there tachypneic (33), hypotensive (92/66) with laboratory workup demonstrating severe SARA (SCR 5.20), lactic acidosis (4.8), HAGMA (AG 20, CO2 19), elevated LFTs (AST 9 83/) with elevated bilirubin (6.5).  CT A/P with large bilateral pleural effusions, abnormal appearance of the prostate with mass effect on the posterior bladder, adrenal gland nodules, various renal lesions and potential liver nodularity.  Broad-spectrum antibiotics initiated and sepsis bolus ordered.  Multiple attempts made to place Shah catheter, unable to pass secondary to enlarged prostate.  Request made to transfer patient to [facility] for availability of urological services and further management of septic shock.  7/4: No overnight events. Pt sleeping at the time of exam. Reports that he is feeling better. Wife at the bedside. Goals of care discussed with her. She needs time to make any decision      HD line and a line placed. Pt lost the pulse. Code blue called. Pt was in PEA arrest. Pt achieved ROSC after 9 minutes of CPR. Family updated about pt's condition. Want him to be full code. Goals of care again discussed with wife at the end of shift. She decided to make him DNR.      7/5: Vasopressors requirement cont to go up. Lactate trended up. Family decided to make him comfortable. Stopped all the support and extubated. Pt passed away at 10:12

## 2025-07-05 NOTE — PROGRESS NOTES
0907 - patient's family in. Updated on patient's medical status.     0684- ICU MD in room and spoke with family. Family decision made to stop all life-saving measures and switch to comfort care only.     1000 - gtt's stopped and patient extubated by RT.    Patient passed and time of death 1012, confirmed by Dr. Shaikh.    1049 - [facility] called and notified of patient's passing. Organs and tissue released. Eyes on hold, awaiting eye bank decision. Nursing supervisor notified. Post-mortem care completed by nursing.

## 2025-07-05 NOTE — PROGRESS NOTES
1000- Patient extubated at this time for comfort measures. MD placed orders. CRRT stopped. RT at bedside with Yaz HAYES and Thony MCQUEEN RN.     1012- Yaz RN and Thony RN at bedside. Dr. Shaikh pronouncing.

## 2025-07-05 NOTE — PROGRESS NOTES
Patient with persistent lactic acidosis, progressive shock despite triple pressor therapy & RRT.  Acidosis temporized multiple times overnight with minimal response.  Discussed with son and wife at bedside that patient will likely not survive despite maximal medical therapy.  They express interest in comfort focused care but request some additional time to discuss.  They plan to return to us with a decision in the next couple of hours, they were also informed that he has a high risk for decompensation within the next couple of hours and they verbalized understanding.         Kevin Aaron, APRN - CNP

## 2025-07-05 NOTE — PLAN OF CARE
Problem: Chronic Conditions and Co-morbidities  Goal: Patient's chronic conditions and co-morbidity symptoms are monitored and maintained or improved  Outcome: Not Progressing     Problem: Respiratory - Adult  Goal: Achieves optimal ventilation and oxygenation  7/4/2025 2130 by Martina Farr RCP  Outcome: Not Progressing

## 2025-07-05 NOTE — PROGRESS NOTES
Spiritual Health History and Assessment/Progress Note  [facility]    Initial Encounter,  , Death, Life Adjustments, Grief and loss,      Name: Lei Jaimes MRN: [id number]    Age: 79 y.o.     Sex: male   Language: English   Taoist: Sabianist   Shock (HCC)     Date: 7/5/2025            Total Time Calculated: 40 min              Spiritual Assessment continued in [facility] A4 INTENSIVE CARE UNIT        Referral/Consult From: Multi-disciplinary team   Encounter Overview/Reason: Initial Encounter  Service Provided For: Patient, Family    Lise, Belief, Meaning:   Patient is connected with a lise tradition or spiritual practice and has beliefs or practices that help with coping during difficult times  Family/Friends are connected with a lise tradition or spiritual practice and have beliefs or practices that help with coping during difficult times      Importance and Influence:  Patient unable to assess at this time  Family/Friends have spiritual/personal beliefs that influence decisions regarding the patient's health    Community:  Patient feels well-supported. Support system includes: Spouse/Partner, Children, and Extended family  Family/Friends feel well-supported. Support system includes: Spouse/Partner, Children, Friends, and Extended family    Assessment and Plan of Care:     Patient Interventions include: Other: provided a ministry of presence  Family/Friends Interventions include: Facilitated expression of thoughts and feelings, Explored spiritual coping/struggle/distress, Engaged in theological reflection, and Affirmed coping skills/support systems    Patient Plan of Care: Spiritual Care available upon further referral  Family/Friends Plan of Care: Spiritual Care available upon further referral     visit to provide support to the family. Collaborated with staff.    Electronically signed by GRETEL Davey on 7/5/2025 at 10:59 AM

## 2025-07-05 NOTE — PROGRESS NOTES
[facility] PROGRESS NOTE.      Name: Lei Jaimes   : 1945   MRN: [id number]   Date: 2025      No chief complaint on file.      Reason for ICU admission: Shock     Hospital Course:     79 y.o. with a PMH of BPH, dysphagia, GERD, HTN, cirrhosis & esophageal stricture who initially presented to [facility] ER on 7/3 with generalized weakness.  Patient presented there tachypneic (33), hypotensive (92/66) with laboratory workup demonstrating severe SARA (SCR 5.20), lactic acidosis (4.8), HAGMA (AG 20, CO2 19), elevated LFTs (AST 9 83/) with elevated bilirubin (6.5).  CT A/P with large bilateral pleural effusions, abnormal appearance of the prostate with mass effect on the posterior bladder, adrenal gland nodules, various renal lesions and potential liver nodularity.  Broad-spectrum antibiotics initiated and sepsis bolus ordered.  Multiple attempts made to place Shah catheter, unable to pass secondary to enlarged prostate.  Request made to transfer patient to [facility] for availability of urological services and further management of septic shock.  : No overnight events. Pt sleeping at the time of exam. Reports that he is feeling better. Wife at the bedside. Goals of care discussed with her. She needs time to make any decision     HD line and a line placed. Pt lost the pulse. Code blue called. Pt was in PEA arrest. Pt achieved ROSC after 9 minutes of CPR. Family updated about pt's condition. Want him to be full code. Goals of care again discussed with wife at the end of shift. She decided to make him DNR.     : Vasopressors requirement cont to go up. Lactate trended up. Family decided to make him comfortable. Stopped all the support and extubated. Pt passed away at 10:12     Assessment & Plan     79 y.o. with a PMH of BPH, dysphagia, GERD, HTN, cirrhosis & esophageal stricture admitted to ICU with cardiogenic shock. ICU course complicated worsening cardiogenic shock,

## 2025-07-05 NOTE — DEATH NOTES
Death Pronouncement Note  Patient's Name: Lei Jaimes   Patient's YOB: 1945  MRN Number: [id number]    Admitting Provider: Khoi Dennis MD  Attending Provider: No att. providers found    Patient was examined and the following were absent: Pulses, Blood Pressure, and Respiratory effort    I declared the patient dead on  at 10:12    Preliminary Cause of Death: Acute CHF exacerbation, cardiogenic shock     Electronically signed by Jona Shaikh MD on 7/5/25 at 2:20 PM EDT

## 2025-07-06 LAB
APTT: ABNORMAL SEC
BACTERIA SPEC CULT: NORMAL
D-DIMER QUANTITATIVE: ABNORMAL MG/L FEU
FIBRINOGEN ACTIVITY: ABNORMAL MG/DL
FLUID CULTURE: NORMAL
FSP PPP LA-ACNC: 20 UG/ML
INR BLD: ABNORMAL
L PNEUMO1 AG UR QL IA: NEGATIVE
Lab: NORMAL
Lab: NORMAL
ORGANISM ID: NORMAL
PLATELET # BLD AUTO: ABNORMAL X10E3/UL
PROTHROMBIN TIME: ABNORMAL SEC
S PNEUM AG SPEC QL LA: NEGATIVE
SPECIMEN SOURCE: NORMAL
SPECIMEN SOURCE: NORMAL
SPECIMEN: NORMAL

## 2025-07-07 LAB
APTT: 50 SEC (ref 24–33)
D-DIMER QUANTITATIVE: >20 MG/L FEU (ref 0–0.49)
EKG ATRIAL RATE: 80 BPM
EKG ATRIAL RATE: 83 BPM
EKG DIAGNOSIS: NORMAL
EKG DIAGNOSIS: NORMAL
EKG P AXIS: -13 DEGREES
EKG P AXIS: 7 DEGREES
EKG P-R INTERVAL: 184 MS
EKG P-R INTERVAL: 209 MS
EKG Q-T INTERVAL: 400 MS
EKG Q-T INTERVAL: 430 MS
EKG QRS DURATION: 117 MS
EKG QRS DURATION: 88 MS
EKG QTC CALCULATION (BAZETT): 470 MS
EKG QTC CALCULATION (BAZETT): 497 MS
EKG R AXIS: -54 DEGREES
EKG R AXIS: 103 DEGREES
EKG T AXIS: -47 DEGREES
EKG T AXIS: 93 DEGREES
EKG VENTRICULAR RATE: 80 BPM
EKG VENTRICULAR RATE: 83 BPM
FIBRINOGEN ACTIVITY: 179 MG/DL (ref 193–507)
FSP PPP LA-ACNC: 20 UG/ML
INR BLD: 2.7 (ref 0.9–1.2)
PLATELET # BLD AUTO: 78 X10E3/UL (ref 150–450)
PROTHROMBIN TIME: 27.8 SEC (ref 9.1–12)

## 2025-07-08 LAB
HCV AB SERPL QL IA: NORMAL
HCV IGG SERPL QL IA: NON REACTIVE S/CO RATIO
Lab: NORMAL
T4 FREE SERPL-MCNC: 1.25 NG/DL (ref 0.82–1.77)
THYROID PEROXIDASE ANTIBODY: 11 IU/ML (ref 0–34)
TSH SERPL DL<=0.05 MIU/L-ACNC: 5.38 UIU/ML (ref 0.45–4.5)

## 2025-07-09 LAB
BACTERIA SPEC CULT: NORMAL
Lab: NORMAL

## 2025-07-21 NOTE — ED PROVIDER NOTES
Last visit: 04/29/25  Last Med refill: 04/18/25  Does patient have enough medication for 72 hours: No:     Next Visit Date:  Future Appointments   Date Time Provider Department Center   7/22/2025  3:00 PM Patricia March RPH [facility] DEP   7/29/2025 10:00 AM Shaina Hamilton MD [facility] DEP   1/12/2026  8:30 AM Bernie Valenzuela PSYD [facility] -       Health Maintenance   Topic Date Due    Shingles vaccine (1 of 2) Never done    Diabetic retinal exam  01/12/2017    Diabetic foot exam  12/01/2017    Respiratory Syncytial Virus (RSV) Pregnant or age 60 yrs+ (1 - Risk 60-74 years 1-dose series) Never done    Diabetic Alb to Cr ratio (uACR) test  09/22/2023    COVID-19 Vaccine (3 - 2024-25 season) 09/01/2024    Breast cancer screen  04/25/2025    Lipids  06/05/2025    A1C test (Diabetic or Prediabetic)  06/26/2025    Flu vaccine (1) 08/01/2025    Depression Monitoring  03/21/2026    GFR test (Diabetes, CKD 3-4, OR last GFR 15-59)  03/26/2026    DTaP/Tdap/Td vaccine (4 - Td or Tdap) 08/25/2032    Colorectal Cancer Screen  07/17/2034    Pneumococcal 50+ years Vaccine  Completed    Hepatitis C screen  Completed    HIV screen  Completed    Hepatitis A vaccine  Aged Out    Hepatitis B vaccine  Aged Out    Hib vaccine  Aged Out    Polio vaccine  Aged Out    Meningococcal (ACWY) vaccine  Aged Out    Meningococcal B vaccine  Aged Out    Depression Screen  Discontinued    Pneumococcal 0-49 years Vaccine  Discontinued       Hemoglobin A1C (%)   Date Value   03/26/2025 9.5 (H)   02/04/2025 9.4   08/06/2024 8.1             ( goal A1C is < 7)   No components found for: \"LABMICR\"  No components found for: \"LDLCHOLESTEROL\", \"LDLCALC\"    (goal LDL is <100)   AST (U/L)   Date Value   03/26/2025 58 (H)     ALT (U/L)   Date Value   03/26/2025 80 (H)     BUN (mg/dL)   Date Value   03/26/2025 19     BP Readings from Last 3 Encounters:   04/29/25 126/80   04/04/25 135/75   03/26/25 (!) 155/89          (goal  Acid, Plasma 4.2 (HH) 0.4 - 2.0 mmol/L        Radiologic Studies:  Radiographic images are visualized and preliminarily interpreted by the ED Provider with the following findings: Xray Interpreted by me.  Shows bilateral large pleural effusion.     Interpretation per the Radiologist below, if available at the time of this note:  CT CHEST WO CONTRAST   Final Result   Chest.   1. Moderate to large bilateral pleural effusions, right greater than left with   dependent atelectasis. Streaky opacities are also seen in the right middle lobe,   however these are favored to be atelectatic.   2. 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   3. Ascending aortic aneurysm measuring up to 4.1 cm with severe atherosclerosis.   Incidental findings as discussed above.      Abdomen and pelvis.   1. Overall edematous appearance of the mesentery and retroperitoneal fat, with   likely small volume ascites.   2. Markedly abnormal appearance of the prostate with mass effect on the   posterior bladder. Correlate with history as this was seen previously. A primary   bladder neoplasm is also a possibility but this is felt to be less likely given   the appearance on prior study.   3. Numerous indeterminant findings including possible adrenal gland   thickening/nodules, various renal lesions, and possible liver nodularity. These   findings are not well evaluated due to the lack of contrast, and further   investigation into these findings can be considered as clinically indicated   Numerous incidental findings as discussed above.         Electronically signed by Karolina Rivera      CT ABDOMEN PELVIS WO CONTRAST Additional Contrast? None   Final Result   Chest.   1. Moderate to large bilateral pleural effusions, right greater than left with   dependent atelectasis. Streaky opacities are also seen in the right middle lobe,   however these are favored to be atelectatic.   2.  User Index  [MC] Carlos Alexandra MD       Patient was given the following medications:  Medications   cefTRIAXone (ROCEPHIN) 1 g injection (  Not Given 7/3/25 1104)   sterile water injection (  Not Given 7/3/25 1105)   tamsulosin (FLOMAX) capsule 0.4 mg (has no administration in time range)   sodium chloride 0.9 % bolus 1,000 mL (0 mLs IntraVENous Stopped 7/3/25 1200)   sodium chloride 0.9 % bolus 2,217 mL (0 mLs IntraVENous Stopped 7/3/25 1451)   ceFEPIme (MAXIPIME) 2,000 mg in sodium chloride 0.9 % 100 mL IVPB (addEASE) (0 mg IntraVENous Stopped 7/3/25 1615)   vancomycin (VANCOCIN) 2000 mg in 400 mL IVPB (0 mg IntraVENous Stopped 7/3/25 1430)       CONSULTS: See ED Course/MDM for further details.  None   PROCEDURES   Unless otherwise noted above, none  Procedures    SEPSIS REASSESSMENT & CRITICAL CARE TIME   SEPSIS REASSESSMENT: Critical Care Time: There was a high probability of life-threatening clinical deterioration in the patient's condition requiring my urgent intervention.  I personally saw the patient and independently provided 35 minutes of non-concurrent critical care out of the total shared critical care time provided, excluding separately reportable procedures.   CLINICAL IMPRESSIONS     1. Acute renal failure, unspecified acute renal failure type    2. Septic shock (HCC)    3. Prostate hypertrophy    4. Transaminitis    5. Pleural effusion       SDOH/DISPOSITION/PLAN   Social Determinants affecting Treatment Plan: None    DISPOSITION Decision To Transfer 07/03/2025 05:02:54 PM   DISPOSITION CONDITION Stable         Transfer: The patient is being transferred to [facility]. The results of their tests and reasons for their transfer have been discussed with the patient and/or available family. The patient/family has conveyed agreement and understanding for the need to be transferred and for their diagnosis. Consultation has been made with Dr. Dennis, who agrees to accept the transfer.

## (undated) DEVICE — SUTURE VCRL + SZ 3-0 L27IN ABSRB UD L26MM SH 1/2 CIR VCP416H

## (undated) DEVICE — SYRINGE MED 10ML LUERLOCK TIP W/O SFTY DISP

## (undated) DEVICE — CANNULA NSL O2 AD 7 FT END-TIDAL CARBON DIOX VENTFLO

## (undated) DEVICE — MASK POM PROCEDURE OXY W/ HI CONCENTRATION CO2 MONITOR

## (undated) DEVICE — SYRINGE IRRIG 60ML SFT PLIABLE BLB EZ TO GRP 1 HND USE W/

## (undated) DEVICE — JELLY,LUBE,STERILE,FLIP TOP,TUBE,4-OZ: Brand: MEDLINE

## (undated) DEVICE — 1200CC GUARDIAN II: Brand: GUARDIAN

## (undated) DEVICE — SOLUTION IRRIG 500ML 0.9% SOD CHLO USP POUR PLAS BTL

## (undated) DEVICE — SOLUTION IRRIG 1000ML STRL H2O USP PLAS POUR BTL

## (undated) DEVICE — SPONGE LAPAROTOMY W18XL18IN WHITE STRUNG RADIOPAQUE STERILE

## (undated) DEVICE — APPLICATOR MEDICATED 26 CC SOLUTION HI LT ORNG CHLORAPREP

## (undated) DEVICE — SPONGE GZ W4XL4IN COT 12 PLY TYP VII WVN C FLD DSGN STERILE

## (undated) DEVICE — IMPLANTABLE DEVICE
Type: IMPLANTABLE DEVICE | Site: INGUINAL | Status: NON-FUNCTIONAL
Removed: 2023-05-15

## (undated) DEVICE — FORCEPS BX L240CM WRK CHN 2.8MM STD CAP W/ NDL MIC MESH

## (undated) DEVICE — SUTURE PROL SZ 2-0 L30IN NONABSORBABLE BLU L26MM CT-1 1/2 8423H

## (undated) DEVICE — DEVICE INFL 60ML 15ATM DISPOSABLE STERIFLATE CRE

## (undated) DEVICE — STERILE POLYISOPRENE POWDER-FREE SURGICAL GLOVES: Brand: PROTEXIS

## (undated) DEVICE — PENROSE DRAIN 18 X .5" SILICONE: Brand: MEDLINE

## (undated) DEVICE — PAD,NON-ADHERENT,2X3,STERILE,LF,1/PK: Brand: MEDLINE

## (undated) DEVICE — FCPS RAD JAW 4 SC 240CM W/NDL --

## (undated) DEVICE — GLOVE SURG SZ 65 THK91MIL LTX FREE SYN POLYISOPRENE

## (undated) DEVICE — SUTURE MCRYL + SZ 4-0 L27IN ABSRB UD L19MM PS-2 3/8 CIR MCP426H

## (undated) DEVICE — ULNAR NERVE PROTECTOR FOAM POSITIONER: Brand: CARDINAL HEALTH

## (undated) DEVICE — SPONGE GZ W4XL4IN COT 12 PLY TYP VII WVN C FLD DSGN

## (undated) DEVICE — PAD,PREPPING,CUFFED,24X48,7",NONSTERILE: Brand: MEDLINE

## (undated) DEVICE — FORCEP BX JUMBO 2.8 MMX240 CM ORNG RADIAL JAW 4 M00513363

## (undated) DEVICE — SUTURE ABSORBABLE BRAIDED 2-0 CT 36 IN DA UD VICRYL VCP957H

## (undated) DEVICE — LIQUIBAND RAPID ADHESIVE 36/CS 0.8ML: Brand: MEDLINE

## (undated) DEVICE — PROCEDURE KIT BX00717111

## (undated) DEVICE — TUBING, SUCTION, 9/32" X 10', STRAIGHT: Brand: MEDLINE

## (undated) DEVICE — SOL IRR STRL H2O 1000ML BTL --

## (undated) DEVICE — LINE SAMPLING ADVANCE ORAL NASAL MICROSTREAM O2 TUBING 6.5'

## (undated) DEVICE — SOUTHSIDE TURNOVER: Brand: MEDLINE INDUSTRIES, INC.

## (undated) DEVICE — GLOVE ORANGE PI 7 1/2   MSG9075

## (undated) DEVICE — ESOPHAGEAL BALLOON DILATATION CATHETER: Brand: CRE FIXED WIRE

## (undated) DEVICE — 3M™ TEGADERM™ TRANSPARENT FILM DRESSING FRAME STYLE, 1626W, 4 IN X 4-3/4 IN (10 CM X 12 CM), 50/CT 4CT/CASE: Brand: 3M™ TEGADERM™

## (undated) DEVICE — MINOR GENERAL PACK: Brand: MEDLINE INDUSTRIES, INC.

## (undated) DEVICE — Device: Brand: JELCO

## (undated) DEVICE — GARMENT,MEDLINE,DVT,INT,CALF,MED, GEN2: Brand: MEDLINE

## (undated) DEVICE — GLOVE SURG SZ 65 L12IN FNGR THK79MIL GRN LTX FREE

## (undated) DEVICE — BLADE,CARBON-STEEL,10,STRL,DISPOSABLE,TB: Brand: MEDLINE

## (undated) DEVICE — WASH CLOTH INCONT LO LINT PREM 12X13 IN LF DISP

## (undated) DEVICE — YANKAUER,BULB TIP,W/O VENT,RIGID,STERILE: Brand: MEDLINE

## (undated) DEVICE — SUTURE VCRL + SZ 2-0 L27IN ABSRB WHT SH 1/2 CIR TAPERCUT VCP417H